# Patient Record
Sex: MALE | Race: WHITE | NOT HISPANIC OR LATINO | URBAN - METROPOLITAN AREA
[De-identification: names, ages, dates, MRNs, and addresses within clinical notes are randomized per-mention and may not be internally consistent; named-entity substitution may affect disease eponyms.]

---

## 2017-01-25 ENCOUNTER — ALLSCRIPTS OFFICE VISIT (OUTPATIENT)
Dept: OTHER | Facility: OTHER | Age: 62
End: 2017-01-25

## 2017-01-25 DIAGNOSIS — M25.522 PAIN IN LEFT ELBOW: ICD-10-CM

## 2017-01-25 DIAGNOSIS — M77.02 MEDIAL EPICONDYLITIS OF LEFT ELBOW: ICD-10-CM

## 2017-07-05 ENCOUNTER — GENERIC CONVERSION - ENCOUNTER (OUTPATIENT)
Dept: OTHER | Facility: OTHER | Age: 62
End: 2017-07-05

## 2017-07-17 ENCOUNTER — ALLSCRIPTS OFFICE VISIT (OUTPATIENT)
Dept: OTHER | Facility: OTHER | Age: 62
End: 2017-07-17

## 2017-07-17 LAB
BILIRUB UR QL STRIP: NORMAL
CLARITY UR: NORMAL
COLOR UR: YELLOW
GLUCOSE (HISTORICAL): NORMAL
HGB UR QL STRIP.AUTO: NORMAL
KETONES UR STRIP-MCNC: NORMAL MG/DL
LEUKOCYTE ESTERASE UR QL STRIP: NORMAL
NITRITE UR QL STRIP: NORMAL
PH UR STRIP.AUTO: 6 [PH]
PROT UR STRIP-MCNC: NORMAL MG/DL
SP GR UR STRIP.AUTO: 1.01
UROBILINOGEN UR QL STRIP.AUTO: NORMAL

## 2018-01-12 VITALS
SYSTOLIC BLOOD PRESSURE: 124 MMHG | BODY MASS INDEX: 24.71 KG/M2 | DIASTOLIC BLOOD PRESSURE: 68 MMHG | WEIGHT: 163 LBS | HEIGHT: 68 IN | HEART RATE: 78 BPM

## 2018-01-13 VITALS
DIASTOLIC BLOOD PRESSURE: 97 MMHG | WEIGHT: 169 LBS | HEART RATE: 76 BPM | SYSTOLIC BLOOD PRESSURE: 137 MMHG | BODY MASS INDEX: 25.61 KG/M2 | HEIGHT: 68 IN

## 2018-05-15 DIAGNOSIS — I48.91 ATRIAL FIBRILLATION, UNSPECIFIED TYPE (HCC): Primary | ICD-10-CM

## 2018-05-15 RX ORDER — APIXABAN 5 MG/1
TABLET, FILM COATED ORAL
Qty: 180 TABLET | Refills: 2 | Status: SHIPPED | OUTPATIENT
Start: 2018-05-15 | End: 2019-06-20 | Stop reason: SDUPTHER

## 2019-06-20 DIAGNOSIS — I48.91 ATRIAL FIBRILLATION, UNSPECIFIED TYPE (HCC): ICD-10-CM

## 2019-06-21 RX ORDER — APIXABAN 5 MG/1
TABLET, FILM COATED ORAL
Qty: 180 TABLET | Refills: 1 | Status: SHIPPED | OUTPATIENT
Start: 2019-06-21 | End: 2020-08-24 | Stop reason: HOSPADM

## 2020-08-19 ENCOUNTER — APPOINTMENT (EMERGENCY)
Dept: RADIOLOGY | Facility: HOSPITAL | Age: 65
DRG: 064 | End: 2020-08-19
Payer: COMMERCIAL

## 2020-08-19 ENCOUNTER — HOSPITAL ENCOUNTER (INPATIENT)
Facility: HOSPITAL | Age: 65
LOS: 5 days | Discharge: HOME | DRG: 064 | End: 2020-08-24
Attending: EMERGENCY MEDICINE | Admitting: EMERGENCY MEDICINE
Payer: COMMERCIAL

## 2020-08-19 DIAGNOSIS — E03.9 HYPOTHYROIDISM, UNSPECIFIED TYPE: ICD-10-CM

## 2020-08-19 DIAGNOSIS — I63.9 CVA (CEREBRAL VASCULAR ACCIDENT) (HCC): Primary | ICD-10-CM

## 2020-08-19 DIAGNOSIS — Z91.14 NONCOMPLIANCE WITH MEDICATION REGIMEN: ICD-10-CM

## 2020-08-19 DIAGNOSIS — I61.4 CEREBELLAR HEMORRHAGE (HCC): ICD-10-CM

## 2020-08-19 DIAGNOSIS — I10 ESSENTIAL HYPERTENSION: ICD-10-CM

## 2020-08-19 DIAGNOSIS — I63.9 CEREBELLAR STROKE (HCC): ICD-10-CM

## 2020-08-19 PROBLEM — R42 DIZZINESS: Status: ACTIVE | Noted: 2020-08-19

## 2020-08-19 LAB
ALBUMIN SERPL BCP-MCNC: 3.8 G/DL (ref 3.5–5)
ALP SERPL-CCNC: 52 U/L (ref 46–116)
ALT SERPL W P-5'-P-CCNC: 46 U/L (ref 12–78)
ANION GAP SERPL CALCULATED.3IONS-SCNC: 6 MMOL/L (ref 4–13)
APTT PPP: 28 SECONDS (ref 23–37)
AST SERPL W P-5'-P-CCNC: 38 U/L (ref 5–45)
ATRIAL RATE: 288 BPM
ATRIAL RATE: 312 BPM
BASOPHILS # BLD AUTO: 0.03 THOUSANDS/ΜL (ref 0–0.1)
BASOPHILS NFR BLD AUTO: 0 % (ref 0–1)
BILIRUB DIRECT SERPL-MCNC: 0.32 MG/DL (ref 0–0.2)
BILIRUB SERPL-MCNC: 1.12 MG/DL (ref 0.2–1)
BUN SERPL-MCNC: 16 MG/DL (ref 5–25)
CALCIUM SERPL-MCNC: 8.9 MG/DL (ref 8.3–10.1)
CHLORIDE SERPL-SCNC: 99 MMOL/L (ref 100–108)
CO2 SERPL-SCNC: 32 MMOL/L (ref 21–32)
CREAT SERPL-MCNC: 1.07 MG/DL (ref 0.6–1.3)
EOSINOPHIL # BLD AUTO: 0.03 THOUSAND/ΜL (ref 0–0.61)
EOSINOPHIL NFR BLD AUTO: 0 % (ref 0–6)
ERYTHROCYTE [DISTWIDTH] IN BLOOD BY AUTOMATED COUNT: 12.9 % (ref 11.6–15.1)
GFR SERPL CREATININE-BSD FRML MDRD: 74 ML/MIN/1.73SQ M
GLUCOSE SERPL-MCNC: 143 MG/DL (ref 65–140)
HCT VFR BLD AUTO: 52 % (ref 36.5–49.3)
HGB BLD-MCNC: 18 G/DL (ref 12–17)
IMM GRANULOCYTES # BLD AUTO: 0.03 THOUSAND/UL (ref 0–0.2)
IMM GRANULOCYTES NFR BLD AUTO: 0 % (ref 0–2)
INR PPP: 1 (ref 0.84–1.19)
LYMPHOCYTES # BLD AUTO: 1.47 THOUSANDS/ΜL (ref 0.6–4.47)
LYMPHOCYTES NFR BLD AUTO: 17 % (ref 14–44)
MCH RBC QN AUTO: 35.4 PG (ref 26.8–34.3)
MCHC RBC AUTO-ENTMCNC: 34.6 G/DL (ref 31.4–37.4)
MCV RBC AUTO: 102 FL (ref 82–98)
MONOCYTES # BLD AUTO: 0.73 THOUSAND/ΜL (ref 0.17–1.22)
MONOCYTES NFR BLD AUTO: 8 % (ref 4–12)
NEUTROPHILS # BLD AUTO: 6.54 THOUSANDS/ΜL (ref 1.85–7.62)
NEUTS SEG NFR BLD AUTO: 75 % (ref 43–75)
NRBC BLD AUTO-RTO: 0 /100 WBCS
PLATELET # BLD AUTO: 162 THOUSANDS/UL (ref 149–390)
PMV BLD AUTO: 10.6 FL (ref 8.9–12.7)
POTASSIUM SERPL-SCNC: 3.8 MMOL/L (ref 3.5–5.3)
PROT SERPL-MCNC: 7.9 G/DL (ref 6.4–8.2)
PROTHROMBIN TIME: 13.2 SECONDS (ref 11.6–14.5)
QRS AXIS: -46 DEGREES
QRS AXIS: -69 DEGREES
QRSD INTERVAL: 84 MS
QRSD INTERVAL: 84 MS
QT INTERVAL: 300 MS
QT INTERVAL: 304 MS
QTC INTERVAL: 391 MS
QTC INTERVAL: 424 MS
RBC # BLD AUTO: 5.08 MILLION/UL (ref 3.88–5.62)
SODIUM SERPL-SCNC: 137 MMOL/L (ref 136–145)
T WAVE AXIS: 70 DEGREES
T WAVE AXIS: 89 DEGREES
T4 FREE SERPL-MCNC: 0.61 NG/DL (ref 0.76–1.46)
TROPONIN I SERPL-MCNC: <0.02 NG/ML
TROPONIN I SERPL-MCNC: <0.02 NG/ML
TSH SERPL DL<=0.05 MIU/L-ACNC: 10.6 UIU/ML (ref 0.36–3.74)
VENTRICULAR RATE: 102 BPM
VENTRICULAR RATE: 117 BPM
WBC # BLD AUTO: 8.83 THOUSAND/UL (ref 4.31–10.16)

## 2020-08-19 PROCEDURE — 84484 ASSAY OF TROPONIN QUANT: CPT | Performed by: EMERGENCY MEDICINE

## 2020-08-19 PROCEDURE — 99223 1ST HOSP IP/OBS HIGH 75: CPT | Performed by: EMERGENCY MEDICINE

## 2020-08-19 PROCEDURE — 96374 THER/PROPH/DIAG INJ IV PUSH: CPT

## 2020-08-19 PROCEDURE — 99223 1ST HOSP IP/OBS HIGH 75: CPT | Performed by: PSYCHIATRY & NEUROLOGY

## 2020-08-19 PROCEDURE — 99285 EMERGENCY DEPT VISIT HI MDM: CPT | Performed by: EMERGENCY MEDICINE

## 2020-08-19 PROCEDURE — 80076 HEPATIC FUNCTION PANEL: CPT | Performed by: EMERGENCY MEDICINE

## 2020-08-19 PROCEDURE — 84439 ASSAY OF FREE THYROXINE: CPT | Performed by: EMERGENCY MEDICINE

## 2020-08-19 PROCEDURE — 99291 CRITICAL CARE FIRST HOUR: CPT

## 2020-08-19 PROCEDURE — 71045 X-RAY EXAM CHEST 1 VIEW: CPT

## 2020-08-19 PROCEDURE — 36415 COLL VENOUS BLD VENIPUNCTURE: CPT | Performed by: EMERGENCY MEDICINE

## 2020-08-19 PROCEDURE — 70496 CT ANGIOGRAPHY HEAD: CPT

## 2020-08-19 PROCEDURE — 93005 ELECTROCARDIOGRAM TRACING: CPT

## 2020-08-19 PROCEDURE — 85730 THROMBOPLASTIN TIME PARTIAL: CPT | Performed by: EMERGENCY MEDICINE

## 2020-08-19 PROCEDURE — 99255 IP/OBS CONSLTJ NEW/EST HI 80: CPT | Performed by: NEUROLOGICAL SURGERY

## 2020-08-19 PROCEDURE — 85025 COMPLETE CBC W/AUTO DIFF WBC: CPT | Performed by: EMERGENCY MEDICINE

## 2020-08-19 PROCEDURE — 80048 BASIC METABOLIC PNL TOTAL CA: CPT | Performed by: EMERGENCY MEDICINE

## 2020-08-19 PROCEDURE — 93010 ELECTROCARDIOGRAM REPORT: CPT | Performed by: INTERNAL MEDICINE

## 2020-08-19 PROCEDURE — 96361 HYDRATE IV INFUSION ADD-ON: CPT

## 2020-08-19 PROCEDURE — G1004 CDSM NDSC: HCPCS

## 2020-08-19 PROCEDURE — 85610 PROTHROMBIN TIME: CPT | Performed by: EMERGENCY MEDICINE

## 2020-08-19 PROCEDURE — 70498 CT ANGIOGRAPHY NECK: CPT

## 2020-08-19 PROCEDURE — 84443 ASSAY THYROID STIM HORMONE: CPT | Performed by: EMERGENCY MEDICINE

## 2020-08-19 RX ORDER — ESMOLOL HYDROCHLORIDE 10 MG/ML
25-200 INJECTION, SOLUTION INTRAVENOUS
Status: DISCONTINUED | OUTPATIENT
Start: 2020-08-19 | End: 2020-08-20

## 2020-08-19 RX ORDER — METOPROLOL TARTRATE 5 MG/5ML
5 INJECTION INTRAVENOUS EVERY 6 HOURS PRN
Status: DISCONTINUED | OUTPATIENT
Start: 2020-08-19 | End: 2020-08-19

## 2020-08-19 RX ORDER — ONDANSETRON 2 MG/ML
4 INJECTION INTRAMUSCULAR; INTRAVENOUS ONCE
Status: COMPLETED | OUTPATIENT
Start: 2020-08-19 | End: 2020-08-19

## 2020-08-19 RX ORDER — CHLORHEXIDINE GLUCONATE 0.12 MG/ML
15 RINSE ORAL EVERY 12 HOURS SCHEDULED
Status: DISCONTINUED | OUTPATIENT
Start: 2020-08-19 | End: 2020-08-19

## 2020-08-19 RX ORDER — HYDRALAZINE HYDROCHLORIDE 20 MG/ML
10 INJECTION INTRAMUSCULAR; INTRAVENOUS EVERY 6 HOURS PRN
Status: DISCONTINUED | OUTPATIENT
Start: 2020-08-19 | End: 2020-08-20

## 2020-08-19 RX ORDER — ONDANSETRON 2 MG/ML
4 INJECTION INTRAMUSCULAR; INTRAVENOUS EVERY 6 HOURS PRN
Status: DISCONTINUED | OUTPATIENT
Start: 2020-08-19 | End: 2020-08-24 | Stop reason: HOSPADM

## 2020-08-19 RX ORDER — LABETALOL 20 MG/4 ML (5 MG/ML) INTRAVENOUS SYRINGE
10 EVERY 4 HOURS PRN
Status: DISCONTINUED | OUTPATIENT
Start: 2020-08-19 | End: 2020-08-19

## 2020-08-19 RX ADMIN — IOHEXOL 85 ML: 350 INJECTION, SOLUTION INTRAVENOUS at 13:56

## 2020-08-19 RX ADMIN — ONDANSETRON 4 MG: 2 INJECTION INTRAMUSCULAR; INTRAVENOUS at 12:04

## 2020-08-19 RX ADMIN — SODIUM CHLORIDE 4 MG/HR: 0.9 INJECTION, SOLUTION INTRAVENOUS at 19:50

## 2020-08-19 RX ADMIN — METOROPROLOL TARTRATE 5 MG: 5 INJECTION, SOLUTION INTRAVENOUS at 16:59

## 2020-08-19 RX ADMIN — SODIUM CHLORIDE 5 MG/HR: 0.9 INJECTION, SOLUTION INTRAVENOUS at 15:17

## 2020-08-19 RX ADMIN — SODIUM CHLORIDE 1000 ML: 0.9 INJECTION, SOLUTION INTRAVENOUS at 11:58

## 2020-08-19 NOTE — ED PROVIDER NOTES
History  Chief Complaint   Patient presents with    High Blood Pressure     pt reports nausea, vomiting, dizziness (lightheaded), could not walk right, "everytime I get up, I was holding onto stuff  I thought I was convulsing earlier  I felt likethis when I was diagnosed with a-fib"     19-year-old male past medical history of AFib, thyroid disease, hypertension who states that he has been noncompliant with his medication for blood pressure and blood thinning for at least 1 year presents to ED stating that 2 nights ago he started to have slurring of his speech as well as difficulty with ambulation  Patient also complaining of nauseousness with few episodes of vomiting  Patient denies fever, sweats, chills, sore throat, cough, chest pain, shortness of breath, abdominal pain, diarrhea constipation dysuria, hematuria  Prior to Admission Medications   Prescriptions Last Dose Informant Patient Reported? Taking? ELIQUIS 5 MG Not Taking at Unknown time  No No   Sig: TAKE 1 TABLET BY MOUTH TWICE A DAY   Patient not taking: Reported on 8/19/2020      Facility-Administered Medications: None       Past Medical History:   Diagnosis Date    A-fib ([facility] [address] )     Disease of thyroid gland     Hypertension        Past Surgical History:   Procedure Laterality Date    ROTATOR CUFF REPAIR         History reviewed  No pertinent family history  I have reviewed and agree with the history as documented  E-Cigarette/Vaping    E-Cigarette Use Never User      E-Cigarette/Vaping Substances     Social History     Tobacco Use    Smoking status: Current Every Day Smoker     Packs/day: 1 00     Types: Cigarettes    Smokeless tobacco: Never Used   Substance Use Topics    Alcohol use: Yes     Drinks per session: 3 or 4    Drug use: Not Currently        Review of Systems   Constitutional: Negative for activity change, appetite change, chills, diaphoresis, fatigue and fever     HENT: Negative for congestion, postnasal drip, rhinorrhea, sinus pressure, sinus pain, sneezing and sore throat  Eyes: Negative for redness and visual disturbance  Respiratory: Negative for apnea, cough, chest tightness, shortness of breath, wheezing and stridor  Cardiovascular: Negative for chest pain, palpitations and leg swelling  Gastrointestinal: Positive for nausea and vomiting  Negative for abdominal distention, abdominal pain, constipation and diarrhea  Endocrine: Negative for polydipsia, polyphagia and polyuria  Genitourinary: Negative for difficulty urinating, dysuria, frequency and urgency  Musculoskeletal: Positive for gait problem  Negative for arthralgias, back pain, joint swelling, myalgias, neck pain and neck stiffness  Skin: Negative for color change, pallor, rash and wound  Neurological: Positive for speech difficulty and weakness  Negative for dizziness, facial asymmetry, light-headedness, numbness and headaches  Physical Exam  ED Triage Vitals   Temperature Pulse Respirations Blood Pressure SpO2   08/19/20 1201 08/19/20 1141 08/19/20 1141 08/19/20 1141 08/19/20 1141   98 3 °F (36 8 °C) 105 20 (!) 189/112 99 %      Temp Source Heart Rate Source Patient Position - Orthostatic VS BP Location FiO2 (%)   08/19/20 1201 08/19/20 1141 08/19/20 1345 -- --   Oral Monitor Lying        Pain Score       08/19/20 1245       5             Orthostatic Vital Signs  Vitals:    08/19/20 1430 08/19/20 1445 08/19/20 1500 08/19/20 1545   BP: (!) 198/112 (!) 190/136 (!) 190/136 156/98   Pulse: 90 100 (!) 118 104   Patient Position - Orthostatic VS:  Lying         Physical Exam  Vitals signs and nursing note reviewed  Constitutional:       General: He is not in acute distress  Appearance: He is well-developed  He is not diaphoretic  HENT:      Head: Normocephalic and atraumatic        Right Ear: External ear normal       Left Ear: External ear normal       Nose: Nose normal    Eyes:      General: No visual field deficit or scleral icterus  Right eye: No discharge  Left eye: No discharge  Conjunctiva/sclera: Conjunctivae normal    Neck:      Musculoskeletal: Normal range of motion and neck supple  Cardiovascular:      Rate and Rhythm: Tachycardia present  Rhythm irregular  Heart sounds: Normal heart sounds  No murmur  No friction rub  No gallop  Pulmonary:      Effort: Pulmonary effort is normal  No respiratory distress  Breath sounds: Normal breath sounds  No wheezing or rales  Abdominal:      General: Bowel sounds are normal  There is no distension  Palpations: Abdomen is soft  There is no mass  Tenderness: There is no abdominal tenderness  There is no guarding  Musculoskeletal: Normal range of motion  General: No tenderness or deformity  Skin:     General: Skin is warm and dry  Coloration: Skin is not pale  Findings: No erythema or rash  Neurological:      Mental Status: He is alert and oriented to person, place, and time  GCS: GCS eye subscore is 4  GCS verbal subscore is 5  GCS motor subscore is 6  Cranial Nerves: Dysarthria present  No cranial nerve deficit or facial asymmetry  Sensory: Sensation is intact  No sensory deficit  Motor: No weakness, tremor, atrophy, abnormal muscle tone or pronator drift  Coordination: Coordination abnormal  Finger-Nose-Finger Test abnormal and Heel to [facility] Test abnormal       Comments: 5/5 strength x 4 extremities  Gross sensation intact  CN intact  Dysmetria and abnormal Heel to shin on R   GCS 15  No pronator drift           Psychiatric:         Mood and Affect: Mood normal          Behavior: Behavior normal          Thought Content:  Thought content normal          Judgment: Judgment normal          ED Medications  Medications   niCARdipine (CARDENE) 25 mg (STANDARD CONCENTRATION) in sodium chloride 0 9% 250 mL (5 mg/hr Intravenous New Bag 8/19/20 1517)   chlorhexidine (PERIDEX) 0 12 % oral rinse 15 mL (has no administration in time range)   hydrALAZINE (APRESOLINE) injection 10 mg (has no administration in time range)   ondansetron (ZOFRAN) injection 4 mg (has no administration in time range)   Labetalol HCl (NORMODYNE) injection 10 mg (has no administration in time range)   sodium chloride 0 9 % bolus 1,000 mL (0 mL Intravenous Stopped 8/19/20 1447)   ondansetron (ZOFRAN) injection 4 mg (4 mg Intravenous Given 8/19/20 1204)   iohexol (OMNIPAQUE) 350 MG/ML injection (MULTI-DOSE) 85 mL (85 mL Intravenous Given 8/19/20 1356)       Diagnostic Studies  Results Reviewed     Procedure Component Value Units Date/Time    TSH, 3rd generation with Free T4 reflex [[id number]]  (Abnormal) Collected:  08/19/20 1158    Lab Status:  Final result Specimen:  Blood from Arm, Left Updated:  08/19/20 1535     TSH 3RD GENERATON 10 600 uIU/mL     Narrative:       Patients undergoing fluorescein dye angiography may retain small amounts of fluorescein in the body for 48-72 hours post procedure  Samples containing fluorescein can produce falsely depressed TSH values  If the patient had this procedure,a specimen should be resubmitted post fluorescein clearance  Hepatic function panel [[id number]]  (Abnormal) Collected:  08/19/20 1158    Lab Status:  Final result Specimen:  Blood from Arm, Left Updated:  08/19/20 1535     Total Bilirubin 1 12 mg/dL      Bilirubin, Direct 0 32 mg/dL      Alkaline Phosphatase 52 U/L      AST 38 U/L      ALT 46 U/L      Total Protein 7 9 g/dL      Albumin 3 8 g/dL     T4, free [[id number]] Collected:  08/19/20 1158    Lab Status:   In process Specimen:  Blood from Arm, Left Updated:  08/19/20 1535    Troponin I [[id number]]  (Normal) Collected:  08/19/20 1446    Lab Status:  Final result Specimen:  Blood from Arm, Left Updated:  08/19/20 1527     Troponin I <0 02 ng/mL     Protime-INR [[id number]]  (Normal) Collected:  08/19/20 1203    Lab Status:  Final result Specimen:  Blood from Arm, Left Updated:  08/19/20 1232 Protime 13 2 seconds      INR 1 00    APTT [[id number]]  (Normal) Collected:  08/19/20 1203    Lab Status:  Final result Specimen:  Blood from Arm, Left Updated:  08/19/20 1232     PTT 28 seconds     Troponin I [[id number]]  (Normal) Collected:  08/19/20 1158    Lab Status:  Final result Specimen:  Blood from Arm, Left Updated:  08/19/20 1228     Troponin I <0 02 ng/mL     Basic metabolic panel [[id number]]  (Abnormal) Collected:  08/19/20 1158    Lab Status:  Final result Specimen:  Blood from Arm, Left Updated:  08/19/20 1225     Sodium 137 mmol/L      Potassium 3 8 mmol/L      Chloride 99 mmol/L      CO2 32 mmol/L      ANION GAP 6 mmol/L      BUN 16 mg/dL      Creatinine 1 07 mg/dL      Glucose 143 mg/dL      Calcium 8 9 mg/dL      eGFR 74 ml/min/1 73sq m     Narrative:       [place] guidelines for Chronic Kidney Disease (CKD):     Stage 1 with normal or high GFR (GFR > 90 mL/min/1 73 square meters)    Stage 2 Mild CKD (GFR = 60-89 mL/min/1 73 square meters)    Stage 3A Moderate CKD (GFR = 45-59 mL/min/1 73 square meters)    Stage 3B Moderate CKD (GFR = 30-44 mL/min/1 73 square meters)    Stage 4 Severe CKD (GFR = 15-29 mL/min/1 73 square meters)    Stage 5 End Stage CKD (GFR <15 mL/min/1 73 square meters)  Note: GFR calculation is accurate only with a steady state creatinine    CBC and differential [[id number]]  (Abnormal) Collected:  08/19/20 1158    Lab Status:  Final result Specimen:  Blood from Arm, Left Updated:  08/19/20 1211     WBC 8 83 Thousand/uL      RBC 5 08 Million/uL      Hemoglobin 18 0 g/dL      Hematocrit 52 0 %       fL      MCH 35 4 pg      MCHC 34 6 g/dL      RDW 12 9 %      MPV 10 6 fL      Platelets 041 Thousands/uL      nRBC 0 /100 WBCs      Neutrophils Relative 75 %      Immat GRANS % 0 %      Lymphocytes Relative 17 %      Monocytes Relative 8 %      Eosinophils Relative 0 %      Basophils Relative 0 %      Neutrophils Absolute 6 54 Thousands/µL Immature Grans Absolute 0 03 Thousand/uL      Lymphocytes Absolute 1 47 Thousands/µL      Monocytes Absolute 0 73 Thousand/µL      Eosinophils Absolute 0 03 Thousand/µL      Basophils Absolute 0 03 Thousands/µL                  CTA head and neck with and without contrast   Final Result by Charo Ortiz MD (08/19 6019)      3 1 x 2 0 cm hemorrhage with mild surrounding perifocal edema in the right cerebellum extending to involve the cerebellar vermis may be hypertensive hemorrhage  However consider MRI to exclude any underlying lesion/neoplasm   No large vessel occlusion   Patent vertebrobasilar system    No significant carotid stenosis   No hydrocephalus          I personally discussed this study with SHERLY Oates on 8/19/2020 at 2:48 PM                 Workstation performed: [id number]         XR chest portable   ED Interpretation by Shannon Mohamud MD (08/19 9780)   Right cerebellar abnormality  Final Result by Radha Chu MD (08/19 7035)      No acute cardiopulmonary disease              Workstation performed: [id number]         MRI inpatient order    (Results Pending)         Procedures  ECG 12 Lead Documentation Only    Date/Time: 8/19/2020 12:52 PM  Performed by: Dominic Burton DO  Authorized by: Dominic Burton DO     Indications / Diagnosis:  Stroke workup  ECG reviewed by me, the ED Provider: yes    Patient location:  ED  Previous ECG:     Previous ECG:  Compared to current    Similarity:  No change    Comparison to cardiac monitor: Yes    Interpretation:     Interpretation: abnormal    Rate:     ECG rate:  102    ECG rate assessment: tachycardic    Rhythm:     Rhythm: atrial fibrillation    Ectopy:     Ectopy: none    QRS:     QRS axis:  Left    QRS intervals:  Normal  Conduction:     Conduction: normal    ST segments:     ST segments:  Normal  T waves:     T waves: normal            ED Course  ED Course as of Aug 19 1606   Wed Aug 19, 2020   1506 Consults placed to neurosx and neuro for ct finding  Patient to be admitted to ICU  BP control with IV jerichone      1516 Discussed cerebellar hemorrhage found on [facility] with patient  Stroke Assessment     Row Name 08/19/20 1459             NIH Stroke Scale    Interval  Baseline      Level of Consciousness (1a )  0      LOC Questions (1b )  0      LOC Commands (1c )  0      Best Gaze (2 )  0      Visual (3 )  0      Facial Palsy (4 )  0      Motor Arm, Left (5a )  0      Motor Arm, Right (5b )  0      Motor Leg, Left (6a )  0      Motor Leg, Right (6b )  0      Limb Ataxia (7 )  1      Sensory (8 )  0      Best Language (9 )  0      Dysarthria (10 )  1      Extinction and Inattention (11 ) (Formerly Neglect)  0      Total  2          First Filed Value   TPA Decision  Patient not a TPA candidate  Patient is not a candidate options  -- [hemorrhage]                                MDM  Number of Diagnoses or Management Options  Cerebellar hemorrhage [facility]):   CVA (cerebral vascular accident) [facility]):   Noncompliance with medication regimen:   Diagnosis management comments: Patient physical exam with concern for likely cerebellar infarct  Symptoms started approximately 3648 hours prior to arrival   Will get blood work, EKG, chest x-ray, CTA head and neck  Discussed CT findings with Radiology with concern for right-sided cerebellar hemorrhage  Will lower the patient's blood pressure with Cardene  Goal systolic blood pressure of 150  Admit to ICU  Consult placed to both Neurology and Neurosurgery  Discussed findings with patient at bedside          Disposition  Final diagnoses:   CVA (cerebral vascular accident) ([facility] [address] )   Noncompliance with medication regimen   Cerebellar hemorrhage ([facility] [address] )     Time reflects when diagnosis was documented in both MDM as applicable and the Disposition within this note     Time User Action Codes Description Comment    8/19/2020 11:58 AM Tatyana Pinhook Add [I63 9] CVA (cerebral vascular accident) ([facility] [address] )     8/19/2020 11:59 AM Jun Shahid Add [Z91 14] Noncompliance with medication regimen     8/19/2020  3:15 PM Jimmy Reyes Add [I61 4] Cerebellar hemorrhage [facility])       ED Disposition     ED Disposition Condition Date/Time Comment    Admit Stable Wed Aug 19, 2020  3:15 PM Case was discussed with ICU and the patient's admission status was agreed to be Admission Status: inpatient status to the service of Dr Maritza Mahan   Follow-up Information    None         Patient's Medications   Discharge Prescriptions    No medications on file     No discharge procedures on file  PDMP Review     None           ED Provider  Attending physically available and evaluated Vega Yañez    NABIL managed the patient along with the ED Attending      Electronically Signed by         Yasmeen Vega DO  08/19/20 9466

## 2020-08-19 NOTE — PLAN OF CARE
Problem: Nutrition/Hydration-ADULT  Goal: Nutrient/Hydration intake appropriate for improving, restoring or maintaining nutritional needs  Description: Monitor and assess patient's nutrition/hydration status for malnutrition  Collaborate with interdisciplinary team and initiate plan and interventions as ordered  Monitor patient's weight and dietary intake as ordered or per policy  Utilize nutrition screening tool and intervene as necessary  Determine patient's food preferences and provide high-protein, high-caloric foods as appropriate       INTERVENTIONS:  - Monitor oral intake, urinary output, labs, and treatment plans  - Assess nutrition and hydration status and recommend course of action  - Evaluate amount of meals eaten  - Assist patient with eating if necessary   - Allow adequate time for meals  - Recommend/ encourage appropriate diets, oral nutritional supplements, and vitamin/mineral supplements  - Order, calculate, and assess calorie counts as needed  - Recommend, monitor, and adjust tube feedings and TPN/PPN based on assessed needs  - Assess need for intravenous fluids  - Provide specific nutrition/hydration education as appropriate  - Include patient/family/caregiver in decisions related to nutrition  Outcome: Progressing     Problem: NEUROSENSORY - ADULT  Goal: Achieves stable or improved neurological status  Description: INTERVENTIONS  - Monitor and report changes in neurological status  - Monitor vital signs such as temperature, blood pressure, glucose, and any other labs ordered   - Initiate measures to prevent increased intracranial pressure  - Monitor for seizure activity and implement precautions if appropriate      Outcome: Progressing  Goal: Remains free of injury related to seizures activity  Description: INTERVENTIONS  - Maintain airway, patient safety  and administer oxygen as ordered  - Monitor patient for seizure activity, document and report duration and description of seizure to physician/advanced practitioner  - If seizure occurs,  ensure patient safety during seizure  - Reorient patient post seizure  - Seizure pads on all 4 side rails  - Instruct patient/family to notify RN of any seizure activity including if an aura is experienced  - Instruct patient/family to call for assistance with activity based on nursing assessment  - Administer anti-seizure medications if ordered    Outcome: Progressing  Goal: Achieves maximal functionality and self care  Description: INTERVENTIONS  - Monitor swallowing and airway patency with patient fatigue and changes in neurological status  - Encourage and assist patient to increase activity and self care     - Encourage visually impaired, hearing impaired and aphasic patients to use assistive/communication devices  Outcome: Progressing     Problem: CARDIOVASCULAR - ADULT  Goal: Maintains optimal cardiac output and hemodynamic stability  Description: INTERVENTIONS:  - Monitor I/O, vital signs and rhythm  - Monitor for S/S and trends of decreased cardiac output  - Administer and titrate ordered vasoactive medications to optimize hemodynamic stability  - Assess quality of pulses, skin color and temperature  - Assess for signs of decreased coronary artery perfusion  - Instruct patient to report change in severity of symptoms  Outcome: Progressing  Goal: Absence of cardiac dysrhythmias or at baseline rhythm  Description: INTERVENTIONS:  - Continuous cardiac monitoring, vital signs, obtain 12 lead EKG if ordered  - Administer antiarrhythmic and heart rate control medications as ordered  - Monitor electrolytes and administer replacement therapy as ordered  Outcome: Progressing     Problem: GASTROINTESTINAL - ADULT  Goal: Minimal or absence of nausea and/or vomiting  Description: INTERVENTIONS:  - Administer IV fluids if ordered to ensure adequate hydration  - Maintain NPO status until nausea and vomiting are resolved  - Nasogastric tube if ordered  - Administer ordered antiemetic medications as needed  - Provide nonpharmacologic comfort measures as appropriate  - Advance diet as tolerated, if ordered  - Consider nutrition services referral to assist patient with adequate nutrition and appropriate food choices  Outcome: Progressing     Problem: GENITOURINARY - ADULT  Goal: Maintains or returns to baseline urinary function  Description: INTERVENTIONS:  - Assess urinary function  - Encourage oral fluids to ensure adequate hydration if ordered  - Administer IV fluids as ordered to ensure adequate hydration  - Administer ordered medications as needed  - Offer frequent toileting  - Follow urinary retention protocol if ordered  Outcome: Progressing  Goal: Absence of urinary retention  Description: INTERVENTIONS:  - Assess patients ability to void and empty bladder  - Monitor I/O  - Bladder scan as needed  - Discuss with physician/AP medications to alleviate retention as needed  - Discuss catheterization for long term situations as appropriate  Outcome: Progressing     Problem: METABOLIC, FLUID AND ELECTROLYTES - ADULT  Goal: Electrolytes maintained within normal limits  Description: INTERVENTIONS:  - Monitor labs and assess patient for signs and symptoms of electrolyte imbalances  - Administer electrolyte replacement as ordered  - Monitor response to electrolyte replacements, including repeat lab results as appropriate  - Instruct patient on fluid and nutrition as appropriate  Outcome: Progressing  Goal: Fluid balance maintained  Description: INTERVENTIONS:  - Monitor labs   - Monitor I/O and WT  - Instruct patient on fluid and nutrition as appropriate  - Assess for signs & symptoms of volume excess or deficit  Outcome: Progressing  Goal: Glucose maintained within target range  Description: INTERVENTIONS:  - Monitor Blood Glucose as ordered  - Assess for signs and symptoms of hyperglycemia and hypoglycemia  - Administer ordered medications to maintain glucose within target range  - Assess nutritional intake and initiate nutrition service referral as needed  Outcome: Progressing     Problem: SKIN/TISSUE INTEGRITY - ADULT  Goal: Skin integrity remains intact  Description: INTERVENTIONS  - Identify patients at risk for skin breakdown  - Assess and monitor skin integrity  - Assess and monitor nutrition and hydration status  - Monitor labs (i e  albumin)  - Assess for incontinence   - Turn and reposition patient  - Assist with mobility/ambulation  - Relieve pressure over bony prominences  - Avoid friction and shearing  - Provide appropriate hygiene as needed including keeping skin clean and dry  - Evaluate need for skin moisturizer/barrier cream  - Collaborate with interdisciplinary team (i e  Nutrition, Rehabilitation, etc )   - Patient/family teaching  Outcome: Progressing  Goal: Incision(s), wounds(s) or drain site(s) healing without S/S of infection  Description: INTERVENTIONS  - Assess and document risk factors for skin impairment   - Assess and document dressing, incision, wound bed, drain sites and surrounding tissue  - Consider nutrition services referral as needed  - Oral mucous membranes remain intact  - Provide patient/ family education  Outcome: Progressing  Goal: Oral mucous membranes remain intact  Description: INTERVENTIONS  - Assess oral mucosa and hygiene practices  - Implement preventative oral hygiene regimen  - Implement oral medicated treatments as ordered  - Initiate Nutrition services referral as needed  Outcome: Progressing     Problem: MUSCULOSKELETAL - ADULT  Goal: Maintain or return mobility to safest level of function  Description: INTERVENTIONS:  - Assess patient's ability to carry out ADLs; assess patient's baseline for ADL function and identify physical deficits which impact ability to perform ADLs (bathing, care of mouth/teeth, toileting, grooming, dressing, etc )  - Assess/evaluate cause of self-care deficits   - Assess range of motion  - Assess patient's mobility  - Assess patient's need for assistive devices and provide as appropriate  - Encourage maximum independence but intervene and supervise when necessary  - Involve family in performance of ADLs  - Assess for home care needs following discharge   - Consider OT consult to assist with ADL evaluation and planning for discharge  - Provide patient education as appropriate  Outcome: Progressing  Goal: Maintain proper alignment of affected body part  Description: INTERVENTIONS:  - Support, maintain and protect limb and body alignment  - Provide patient/ family with appropriate education  Outcome: Progressing     Problem: PAIN - ADULT  Goal: Verbalizes/displays adequate comfort level or baseline comfort level  Description: Interventions:  - Encourage patient to monitor pain and request assistance  - Assess pain using appropriate pain scale  - Administer analgesics based on type and severity of pain and evaluate response  - Implement non-pharmacological measures as appropriate and evaluate response  - Consider cultural and social influences on pain and pain management  - Notify physician/advanced practitioner if interventions unsuccessful or patient reports new pain  Outcome: Progressing     Problem: INFECTION - ADULT  Goal: Absence or prevention of progression during hospitalization  Description: INTERVENTIONS:  - Assess and monitor for signs and symptoms of infection  - Monitor lab/diagnostic results  - Monitor all insertion sites, i e  indwelling lines, tubes, and drains  - Monitor endotracheal if appropriate and nasal secretions for changes in amount and color  - Roann appropriate cooling/warming therapies per order  - Administer medications as ordered  - Instruct and encourage patient and family to use good hand hygiene technique  - Identify and instruct in appropriate isolation precautions for identified infection/condition  Outcome: Progressing     Problem: SAFETY ADULT  Goal: Patient will remain free of falls  Description: INTERVENTIONS:  - Assess patient frequently for physical needs  -  Identify cognitive and physical deficits and behaviors that affect risk of falls    -  [place] fall precautions as indicated by assessment   - Educate patient/family on patient safety including physical limitations  - Instruct patient to call for assistance with activity based on assessment  - Modify environment to reduce risk of injury  - Consider OT/PT consult to assist with strengthening/mobility  Outcome: Progressing  Goal: Maintain or return to baseline ADL function  Description: INTERVENTIONS:  -  Assess patient's ability to carry out ADLs; assess patient's baseline for ADL function and identify physical deficits which impact ability to perform ADLs (bathing, care of mouth/teeth, toileting, grooming, dressing, etc )  - Assess/evaluate cause of self-care deficits   - Assess range of motion  - Assess patient's mobility; develop plan if impaired  - Assess patient's need for assistive devices and provide as appropriate  - Encourage maximum independence but intervene and supervise when necessary  - Involve family in performance of ADLs  - Assess for home care needs following discharge   - Consider OT consult to assist with ADL evaluation and planning for discharge  - Provide patient education as appropriate  Outcome: Progressing  Goal: Maintain or return mobility status to optimal level  Description: INTERVENTIONS:  - Assess patient's baseline mobility status (ambulation, transfers, stairs, etc )    - Identify cognitive and physical deficits and behaviors that affect mobility  - Identify mobility aids required to assist with transfers and/or ambulation (gait belt, sit-to-stand, lift, walker, cane, etc )  - [place] fall precautions as indicated by assessment  - Record patient progress and toleration of activity level on Mobility SBAR; progress patient to next Phase/Stage  - Instruct patient to call for assistance with activity based on assessment  - Consider rehabilitation consult to assist with strengthening/weightbearing, etc   Outcome: Progressing     Problem: DISCHARGE PLANNING  Goal: Discharge to home or other facility with appropriate resources  Description: INTERVENTIONS:  - Identify barriers to discharge w/patient and caregiver  - Arrange for needed discharge resources and transportation as appropriate  - Identify discharge learning needs (meds, wound care, etc )  - Arrange for interpretive services to assist at discharge as needed  - Refer to Case Management Department for coordinating discharge planning if the patient needs post-hospital services based on physician/advanced practitioner order or complex needs related to functional status, cognitive ability, or social support system  Outcome: Progressing     Problem: Knowledge Deficit  Goal: Patient/family/caregiver demonstrates understanding of disease process, treatment plan, medications, and discharge instructions  Description: Complete learning assessment and assess knowledge base  Interventions:  - Provide teaching at level of understanding  - Provide teaching via preferred learning methods  Outcome: Progressing     Problem: Neurological Deficit  Goal: Neurological status is stable or improving  Description: Interventions:  - Monitor and assess patient's level of consciousness, motor function, sensory function, and level of assistance needed for ADLs  - Monitor and report changes from baseline  Collaborate with interdisciplinary team to initiate plan and implement interventions as ordered  - Provide and maintain a safe environment  - Consider seizure precautions  - Consider fall precautions  - Consider aspiration precautions  - Consider bleeding precautions  Outcome: Progressing     Problem:  Activity Intolerance/Impaired Mobility  Goal: Mobility/activity is maintained at optimum level for patient  Description: Interventions:  - Assess and monitor patient  barriers to mobility and need for assistive/adaptive devices  - Assess patient's emotional response to limitations  - Collaborate with interdisciplinary team and initiate plans and interventions as ordered  - Encourage independent activity per ability   - Maintain proper body alignment  - Perform active/passive rom as tolerated/ordered  - Plan activities to conserve energy   - Turn patient as appropriate  Outcome: Progressing     Problem: Communication Impairment  Goal: Ability to express needs and understand communication  Description: Assess patient's communication skills and ability to understand information  Patient will demonstrate use of effective communication techniques, alternative methods of communication and understanding even if not able to speak  - Encourage communication and provide alternate methods of communication as needed  - Collaborate with case management/ for discharge needs  - Include patient/family/caregiver in decisions related to communication  Outcome: Progressing     Problem: Potential for Aspiration  Goal: Non-ventilated patient's risk of aspiration is minimized  Description: Assess and monitor vital signs, respiratory status, and labs (WBC)  Monitor for signs of aspiration (tachypnea, cough, rales, wheezing, cyanosis, fever)  - Assess and monitor patient's ability to swallow  - Place patient up in chair to eat if possible  - HOB up at 90 degrees to eat if unable to get patient up into chair   - Supervise patient during oral intake  - Instruct patient/ family to take small bites  - Instruct patient/ family to take small single sips when taking liquids    - Follow patient-specific strategies generated by speech pathologist   Outcome: Progressing     Problem: Nutrition  Goal: Nutrition/Hydration status is improving  Description: Monitor and assess patient's nutrition/hydration status for malnutrition (ex- brittle hair, bruises, dry skin, pale skin and conjunctiva, muscle wasting, smooth red tongue, and disorientation)  Collaborate with interdisciplinary team and initiate plan and interventions as ordered  Monitor patient's weight and dietary intake as ordered or per policy  Utilize nutrition screening tool and intervene per policy  Determine patient's food preferences and provide high-protein, high-caloric foods as appropriate  - Assist patient with eating   - Allow adequate time for meals   - Encourage patient to take dietary supplement as ordered  - Collaborate with clinical nutritionist   - Include patient/family/caregiver in decisions related to nutrition    Outcome: Progressing

## 2020-08-19 NOTE — ED NOTES
[facility] transported to [facility], RN  08/19/20 [id number]

## 2020-08-19 NOTE — ED ATTENDING ATTESTATION
Final Diagnosis:  1  CVA (cerebral vascular accident) ([facility] [address] )    2  Noncompliance with medication regimen    3  Cerebellar hemorrhage [facility])      ED Course as of Aug 23 1332   Wed Aug 19, 2020   1222 WBC: 8 83   1222 Hemoglobin(!): 18 0   1222 Platelet Count: 778   1222 Higher risk of rouleaux --> stroke  Hemoglobin(!): 18 0       IVinita MD, saw and evaluated the patient  All available labs and X-rays were ordered by me or the resident and have been reviewed by myself  I discussed the patient with the resident / non-physician and agree with the resident's / non-physician practitioner's findings and plan as documented in the resident's / non-physician practicitioner's note, except where noted  At this point, I agree with the current assessment done in the ED  I was present during key portions of all procedures performed unless otherwise stated  Chief Complaint   Patient presents with    High Blood Pressure     pt reports nausea, vomiting, dizziness (lightheaded), could not walk right, "everytime I get up, I was holding onto stuff  I thought I was convulsing earlier  I felt likethis when I was diagnosed with a-fib"     This is a 60 y o  male presenting for evaluation of likely stroke  The patient is noncompliant with his medications specifically his Eliquis and beta-blocker for atrial fibrillation for greater than 1 year  He was doing okay until maybe 2 days ago when he started knows that he was having nausea, several episodes of vomiting feeling lightheaded like he would fall over, feeling his ambulation is different and difficult  His speech is also dysarthric since Monday  No fevers chills no chest pain shortness of breath  No palpitations  No for numbness or tingling  PMH:   has a past medical history of A-fib ([facility] [address] ), Disease of thyroid gland, and Hypertension  PSH:   has a past surgical history that includes Rotator cuff repair      Social:  Social History     Substance and Sexual Activity   Alcohol Use Yes    Drinks per session: 3 or 4     Social History     Tobacco Use   Smoking Status Current Every Day Smoker    Packs/day: 0 50    Types: Cigarettes   Smokeless Tobacco Never Used     Social History     Substance and Sexual Activity   Drug Use Not Currently     PE:  Vitals:    08/22/20 2206 08/22/20 2206 08/23/20 0600 08/23/20 0723   BP: 147/81   149/95   BP Location:       Pulse:  60  88   Resp: 18   17   Temp:  98 4 °F (36 9 °C)  99 2 °F (37 3 °C)   TempSrc:       SpO2:  100%  97%   Weight:   69 kg (152 lb 1 9 oz)    Height:       General: VSS, NAD, awake, alert  Well-nourished, well-developed  Appears stated age  Head: Normocephalic, atraumatic, nontender  Eyes: PERRL, EOM-I  No diplopia  No hyphema  No subconjunctival hemorrhages  Symmetrical lids  ENTAtraumatic external nose and ears  MMM  No stridor  Normal phonation  No drooling  Base of mouth is soft  No mastoid tenderness  Neck: Symmetric, trachea midline  No JVD  CV: Peripheral pulses +2 throughout  No chest wall tenderness  Lungs:   Unlabored   No retractions  No crepitus  No tachypnea  No paradoxical motion  Abd: +BS, soft, NT/ND    MSK:   FROM   No lower extremity edema  Back:   No C/T/L-spine tenderness  No CVAT  Skin: Dry, intact  Neuro: AAOx3, GCS 15, CN II-XII grossly intact  Motor grossly intact  Sensory grossly intact  EHL/FHL/PF/DF/KF/KE/HF/HE 5/5  No saddle anesthesia  M/U/R/A nerve sensation bilateral intact and equal    strength 5/5  Good capillary refill  2+ radial pulses, bilaterally equal    BICEPS/TRICEPS 5/5  Shoulder abduction/adduction 5/5  No pronator drift  No aphasia  +dysarthria  AbNormal finger to nose (dysmetria); abnormal heel/shin on RIGHT  Normal rapid alternating movements of hands  No nystagmus  No facial droop     Psychiatric/Behavioral: Appropriate mood and affect   Exam: deferred  A:  - Stroke  P:  - patient likely had a thromboembolic stroke secondary to atrial fibrillation noncompliance with stroke regimen  Permissive hypertension at this time, likely aspirin if CT negative  Will do CTA as well as on concern for posterior cerebral stroke  Will need MRI  Will need optimization of medical care  Will admit to Medicine, consult Neurology  - 13 point ROS was performed and all are normal unless stated in the history above  - Nursing note reviewed  Vitals reviewed  - Orders placed by myself and/or advanced practitioner / resident     - Previous chart was reviewed  - No language barrier    - History obtained from wife patient  - There are no limitations to the history obtained  - Critical care time: Not applicable for this patient  - Patient does not need initiation of IV thrombolytics: Last Known well was 48 hours ago       Code Status: Level 1 - Full Code  Advance Directive and Living Will:      Power of :    POLST:      Medications   ondansetron (ZOFRAN) injection 4 mg ( Intravenous MAR Unhold 8/20/20 1758)   acetaminophen (TYLENOL) tablet 650 mg ( Oral MAR Unhold 8/20/20 1758)   Labetalol HCl (NORMODYNE) injection 10 mg ( Intravenous MAR Unhold 8/20/20 1758)   lisinopril (ZESTRIL) tablet 10 mg (10 mg Oral Given 8/23/20 0836)   metoprolol tartrate (LOPRESSOR) tablet 25 mg (25 mg Oral Given 8/23/20 0836)   levothyroxine tablet 75 mcg (75 mcg Oral Given 8/23/20 0516)   sodium chloride 0 9 % bolus 1,000 mL (0 mL Intravenous Stopped 8/19/20 1447)   ondansetron (ZOFRAN) injection 4 mg (4 mg Intravenous Given 8/19/20 1204)   iohexol (OMNIPAQUE) 350 MG/ML injection (MULTI-DOSE) 85 mL (85 mL Intravenous Given 8/19/20 1356)   gadobutrol injection (MULTI-DOSE) SOLN 6 mL (6 mL Intravenous Given 8/20/20 0130)   potassium chloride (K-DUR,KLOR-CON) CR tablet 40 mEq (40 mEq Oral Given 8/20/20 0947)   potassium chloride (K-DUR,KLOR-CON) CR tablet 40 mEq (40 mEq Oral Given 8/20/20 1212)   iohexol (OMNIPAQUE) 350 MG/ML injection (MULTI-DOSE) 85 mL (85 mL Intravenous Given 8/22/20 1231)     CTA head w wo contrast   Final Result      Minor interval increase in the hemorrhage related to right superior cerebellar arterial infarct  Stable Deformity of the 4th ventricle no indication of hydrocephalus  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  Neurovascular consultation suggested  The study was marked in [facility] for immediate notification  Workstation performed: [id number]         CT head wo contrast   Final Result      Grossly stable anterosuperior right cerebellum parenchymal hematoma and surrounding vasogenic edema  Unchanged regional mass effect and partial effacement of superior 4th ventricle with stable size of ventricular system  No hydrocephalus  Workstation performed: [id number]         MRI brain w wo contrast   Final Result      Right superior cerebellar artery hemorrhagic infarct  Local mass effect without hydrocephalus  Sluggish flow within the right vertebral is likely  The study was marked in [facility] for immediate notification  Workstation performed: [id number]         CTA head and neck with and without contrast   Final Result      3 1 x 2 0 cm hemorrhage with mild surrounding perifocal edema in the right cerebellum extending to involve the cerebellar vermis may be hypertensive hemorrhage  However consider MRI to exclude any underlying lesion/neoplasm   No large vessel occlusion   Patent vertebrobasilar system    No significant carotid stenosis   No hydrocephalus          I personally discussed this study with SHERLY Oates on 8/19/2020 at 2:48 PM                 Workstation performed: [id number]         XR chest portable   ED Interpretation   Right cerebellar abnormality  Final Result      No acute cardiopulmonary disease              Workstation performed: [id number]           Orders Placed This Encounter   Procedures    ED ECG Documentation Only    CTA head and neck with and without contrast    XR chest portable    MRI brain w wo contrast    CT head wo contrast    CTA head w wo contrast    CBC and differential    Basic metabolic panel    Troponin I    TSH, 3rd generation with Free T4 reflex    Hepatic function panel    Protime-INR    APTT    T4, free    CBC and differential    Basic metabolic panel    Magnesium    CBC and differential    Basic metabolic panel    Magnesium    Hemoglobin A1C    Lipid Panel with Direct LDL reflex    Diet Regular; Regular House; Cardiac    Nursing communication Continue IV as ordered      Notify admitting physician    Notify admitting physician on arrival    Vital Signs    Daily weights    I/O    Nursing dysphagia assessment    Turn patient    Up with assistance    Insert peripheral IV    Maintain IV access    Ambulate patient    Nursing Communication SBP goal < 160 mmHg    Continuous Pulse Oximetry    Reassess Vital Signs x 1 in 4 Hours    Reassess CIWA-Ar x 1 in 4 hours    Neuro checks    Level 1-Full Code: all life saving measures are indicated    Inpatient consult to Neurosurgery    Consult to neurology    Consult to Case Management    Occupational Therapy Evaluation and Treatment    Physical Therapy  Evaluation and Treatment    Speech Language Evaluation and Treatment    EKG RESULTS    ECG 12 lead    ECG 12 lead    ECG 12 lead    Echo complete with contrast if indicated    Inpatient Admission    Transfer patient    Fall precautions    Update level of care     Labs Reviewed   CBC AND DIFFERENTIAL - Abnormal       Result Value Ref Range Status    WBC 8 83  4 31 - 10 16 Thousand/uL Final    RBC 5 08  3 88 - 5 62 Million/uL Final    Hemoglobin 18 0 (*) 12 0 - 17 0 g/dL Final    Hematocrit 52 0 (*) 36 5 - 49 3 % Final     (*) 82 - 98 fL Final    MCH 35 4 (*) 26 8 - 34 3 pg Final    MCHC 34 6  31 4 - 37 4 g/dL Final    RDW 12 9  11 6 - 15 1 % Final    MPV 10 6  8 9 - 12 7 fL Final Platelets 740  666 - 390 Thousands/uL Final    nRBC 0  /100 WBCs Final    Neutrophils Relative 75  43 - 75 % Final    Immat GRANS % 0  0 - 2 % Final    Lymphocytes Relative 17  14 - 44 % Final    Monocytes Relative 8  4 - 12 % Final    Eosinophils Relative 0  0 - 6 % Final    Basophils Relative 0  0 - 1 % Final    Neutrophils Absolute 6 54  1 85 - 7 62 Thousands/µL Final    Immature Grans Absolute 0 03  0 00 - 0 20 Thousand/uL Final    Lymphocytes Absolute 1 47  0 60 - 4 47 Thousands/µL Final    Monocytes Absolute 0 73  0 17 - 1 22 Thousand/µL Final    Eosinophils Absolute 0 03  0 00 - 0 61 Thousand/µL Final    Basophils Absolute 0 03  0 00 - 0 10 Thousands/µL Final   BASIC METABOLIC PANEL - Abnormal    Sodium 137  136 - 145 mmol/L Final    Potassium 3 8  3 5 - 5 3 mmol/L Final    Chloride 99 (*) 100 - 108 mmol/L Final    CO2 32  21 - 32 mmol/L Final    ANION GAP 6  4 - 13 mmol/L Final    BUN 16  5 - 25 mg/dL Final    Creatinine 1 07  0 60 - 1 30 mg/dL Final    Comment: Standardized to IDMS reference method    Glucose 143 (*) 65 - 140 mg/dL Final    Comment: If the patient is fasting, the ADA then defines impaired fasting glucose as > 100 mg/dL and diabetes as > or equal to 123 mg/dL  Specimen collection should occur prior to Sulfasalazine administration due to the potential for falsely depressed results  Specimen collection should occur prior to Sulfapyridine administration due to the potential for falsely elevated results      Calcium 8 9  8 3 - 10 1 mg/dL Final    eGFR 74  ml/min/1 73sq m Final    Narrative:     [place] guidelines for Chronic Kidney Disease (CKD):     Stage 1 with normal or high GFR (GFR > 90 mL/min/1 73 square meters)    Stage 2 Mild CKD (GFR = 60-89 mL/min/1 73 square meters)    Stage 3A Moderate CKD (GFR = 45-59 mL/min/1 73 square meters)    Stage 3B Moderate CKD (GFR = 30-44 mL/min/1 73 square meters)    Stage 4 Severe CKD (GFR = 15-29 mL/min/1 73 square meters)    Stage 5 End Stage CKD (GFR <15 mL/min/1 73 square meters)  Note: GFR calculation is accurate only with a steady state creatinine   TSH, 3RD GENERATION WITH FREE T4 REFLEX - Abnormal    TSH 3RD GENERATON 10 600 (*) 0 358 - 3 740 uIU/mL Final    Comment: Using supplements with high doses of biotin 20 to more than 300 times greater than the adequate daily intake for adults of 30 mcg/day as established by the [place] [employer], can cause falsely depress results  Narrative:     Patients undergoing fluorescein dye angiography may retain small amounts of fluorescein in the body for 48-72 hours post procedure  Samples containing fluorescein can produce falsely depressed TSH values  If the patient had this procedure,a specimen should be resubmitted post fluorescein clearance  HEPATIC FUNCTION PANEL - Abnormal    Total Bilirubin 1 12 (*) 0 20 - 1 00 mg/dL Final    Comment: Use of this assay is not recommended for patients undergoing treatment with eltrombopag due to the potential for falsely elevated results  Bilirubin, Direct 0 32 (*) 0 00 - 0 20 mg/dL Final    Alkaline Phosphatase 52  46 - 116 U/L Final    AST 38  5 - 45 U/L Final    Comment: Specimen collection should occur prior to Sulfasalazine and/or Sulfapyridine administration due to the potential for falsely depressed results  ALT 46  12 - 78 U/L Final    Comment: Specimen collection should occur prior to Sulfasalazine and/or Sulfapyridine administration due to the potential for falsely depressed results       Total Protein 7 9  6 4 - 8 2 g/dL Final    Albumin 3 8  3 5 - 5 0 g/dL Final   TROPONIN I - Normal    Troponin I <0 02  <=0 04 ng/mL Final    Comment: Siemens Chemistry analyzer 99% cutoff is > 0 04 ng/mL in network labs     o cTnI 99% cutoff is useful only when applied to patients in the clinical setting of myocardial ischemia   o cTnI 99% cutoff should be interpreted in the context of clinical history, ECG findings and possibly cardiac imaging to establish correct diagnosis  o cTnI 99% cutoff may be suggestive but clearly not indicative of a coronary event without the clinical setting of myocardial ischemia  PROTIME-INR - Normal    Protime 13 2  11 6 - 14 5 seconds Final    INR 1 00  0 84 - 1 19 Final   APTT - Normal    PTT 28  23 - 37 seconds Final    Comment: Therapeutic Heparin Range =  60-90 seconds   TROPONIN I - Normal    Troponin I <0 02  <=0 04 ng/mL Final    Comment: Siemens Chemistry analyzer 99% cutoff is > 0 04 ng/mL in network labs     o cTnI 99% cutoff is useful only when applied to patients in the clinical setting of myocardial ischemia   o cTnI 99% cutoff should be interpreted in the context of clinical history, ECG findings and possibly cardiac imaging to establish correct diagnosis  o cTnI 99% cutoff may be suggestive but clearly not indicative of a coronary event without the clinical setting of myocardial ischemia  Time reflects when diagnosis was documented in both MDM as applicable and the Disposition within this note     Time User Action Codes Description Comment    8/19/2020 11:58 AM Starr Mejias Add [I63 9] CVA (cerebral vascular accident) ([facility] [address] )     8/19/2020 11:59 AM Starr Basil Add [Z91 14] Noncompliance with medication regimen     8/19/2020  3:15 PM Renita Screws Add [I61 4] Cerebellar hemorrhage [facility])       ED Disposition     ED Disposition Condition Date/Time Comment    Admit Stable Wed Aug 19, 2020  3:15 PM Case was discussed with ICU and the patient's admission status was agreed to be Admission Status: inpatient status to the service of Dr Foster Campa           Follow-up Information     Follow up With Specialties Details Why [address] [facility] Neurosurgery Follow up today follow up with Dr Aureliano Smith in 2-4 weeks [address] [facility] [place] [address] [phone]  [facility], [address] [facility], [place], [place], [address]    Niesha Woodard MD  Follow up please call to schedule follow up appointment [id number] [address] [facility] Neurology [facility] Neurology Follow up An appointment was requested for you with the Stroke Clinic within 4 weeks  If you do not hear from the  within 1 week from discharge please call the number above to schedule [facility] [place] [phone]  [address] [facility] Neurology [facility], [address] [facility], [facility], [place], [address]        Current Discharge Medication List      CONTINUE these medications which have NOT CHANGED    Details   ELIQUIS 5 MG TAKE 1 TABLET BY MOUTH TWICE A DAY  Qty: 180 tablet, Refills: 1    Associated Diagnoses: Atrial fibrillation, unspecified type ([facility] [address] )           No discharge procedures on file  Prior to Admission Medications   Prescriptions Last Dose Informant Patient Reported? Taking? ELIQUIS 5 MG Not Taking at Unknown time  No No   Sig: TAKE 1 TABLET BY MOUTH TWICE A DAY   Patient not taking: Reported on 8/19/2020      Facility-Administered Medications: None       Portions of the record may have been created with voice recognition software  Occasional wrong word or "sound a like" substitutions may have occurred due to the inherent limitations of voice recognition software  Read the chart carefully and recognize, using context, where substitutions have occurred      Electronically signed by:  Jerrell Butts

## 2020-08-19 NOTE — H&P
H&P Exam - Critical Care   Jasmyne Salgado  60 y o  male MRN: [id number]  Unit/Bed#: ED 08 Encounter: [id number]      -------------------------------------------------------------------------------------------------------------  Chief Complaint: 2 days of nausea/vomiting, lightheadedness, gait difficulty, dysarthria    History of Present Illness     Jasmyne Salgado  is a 60 y o  male PMHx Afib (non-compliant w/ Eliquis), HTN, hypothyroidism, who presents after 2 days of n/v, lightheadedness, gait difficulties, and dysarthria, found to have R cerebellar hemorrhage w/ surrounding edema w/ some mass effect on the 4th ventricle on CTA   Admitted for ICU care  2 days ago had nausea with non-bloody, non-bilious emesis lasting 1 day  At this time, also noticed lightheadedness with difficulty maintaining balance and difficulty walking  Also noticed his speech changed, noticed more slurring of words and stuttering  In the ED, pt AAOx4, states his walking was a little better this morning than the previous day  Denies any headache, changes in vision, n/v/d, cough, chest pain/SOB     -------------------------------------------------------------------------------------------------------------  Assessment and Plan: 63yo M MHx Afib (non-compliant w/ Eliquis), HTN, hypothyroidism, who presents after 2 days of n/v, lightheadedness, gait difficulties, and dysarthria, found to have R cerebellar hemorrhage w/ surrounding edema & mild mass effect on the 4th ventricle on CTA  Admitted for ICU care  Neuro:    Diagnosis: R cerebellar hemorrhage w/ surrounding edema on CTA--etiology thromboembolic stroke w/ secondary hemorrhagic conversion vs hemorrhagic stroke vs cerebellar mass  CTA: 3x2 cm hemorrhage w/ mild surrounding perifocal edema in R Cerebellum extending to involve cerebellar vermis w/ mild mass effect on 4th ventricle    Plan:  · MRI brain w/ & w/o contrast   · SBP goal <160, cardene drip started  · Hold home eliquis in setting of hemorrhage  · NsGY, Neurology following  · Analgesia:  · Sedation: none  · Lipids, A1C, TSH  · Tele  · SLP when appropriate    CV:   Diagnosis: HTN  BP in -200's/110's  Per patient hasn't been taking any of his prescribed medications for over 1 year  Plan:  · SBP goal <160  · Cardene drip started  · Hydralazine 10mg IV, Labetalol 10mg IV PRN for SBP <160      Diagnosis: Afib w/ RVR  Home meds: Eliquis 5mg BID, pt non-compliant  Plan:  · Tele  · Holding eliquis in setting of hemorrhage  · Metoprolol 5mg IV q6PRN for HR >130 bpm        Pulm: no acute issues      GI:   · IV Zofran 4mg PRN  · Ulcer ppx: IV Pepcid while NPO  · Bowel regimen: PRN        : no acute issues      F/E/N:    Fluids: received 1L NS in ED   Electrolytes: monitor & replete as needed, so far lytes wnl   Nutrition: NPO      Heme/Onc: no acute issues  Hgb 18 0 likely 2/2 hypovolemia in setting of 2 days n/v  · Continue to monitor CBC  · DVT PPx: SCDs, no ACs in setting of hemorrhage      Endo:   Diagnosis: Hx Hypothyroidism  Home med: Levothyroxine per chart review, per patient not taking any of his prescribed meds for over 1 year  · Currently holding home meds    ID: no acute issues  WBC 8 83        MSK/Skin: no acute issues  o PT/OT when appropriate      Disposition: Admit to Critical Care   Code Status: No Order  --------------------------------------------------------------------------------------------------------------  Review of Systems   Constitutional: Negative  HENT: Negative  Eyes: Negative for photophobia and visual disturbance  Respiratory: Negative for cough and shortness of breath  Cardiovascular: Negative for chest pain  Gastrointestinal: Negative for abdominal distention, abdominal pain, diarrhea, nausea and vomiting  Genitourinary: Negative for difficulty urinating, frequency and urgency  Musculoskeletal: Positive for gait problem  Negative for arthralgias  Skin: Negative for pallor and rash  Neurological: Positive for dizziness, speech difficulty and light-headedness  Negative for tremors, syncope, weakness, numbness and headaches  Psychiatric/Behavioral: Negative for agitation and confusion  A 12-point, complete review of systems was reviewed and negative except as stated above     Physical Exam  Constitutional:       General: He is not in acute distress  HENT:      Head: Normocephalic and atraumatic  Mouth/Throat:      Mouth: Mucous membranes are dry  Cardiovascular:      Rate and Rhythm: Tachycardia present  Rhythm irregular  Pulses: Normal pulses  Heart sounds: Normal heart sounds  No murmur  No friction rub  No gallop  Pulmonary:      Effort: Pulmonary effort is normal       Breath sounds: No wheezing, rhonchi or rales  Comments: Decreased breath sounds b/l bases  Abdominal:      General: Bowel sounds are normal  There is no distension  Palpations: Abdomen is soft  Tenderness: There is no abdominal tenderness  Skin:     General: Skin is warm and dry  Capillary Refill: Capillary refill takes less than 2 seconds  Neurological:      Mental Status: He is alert and oriented to person, place, and time  Cranial Nerves: No cranial nerve deficit  Sensory: No sensory deficit  Motor: No weakness  Coordination: Coordination abnormal       Comments: RUE dysmetria on finger to nose, RLE heel to shin no dysmetria, dysarthric   Psychiatric:         Mood and Affect: Mood normal          Thought Content:  Thought content normal          Judgment: Judgment normal        --------------------------------------------------------------------------------------------------------------  Vitals:   Vitals:    08/19/20 1315 08/19/20 1345 08/19/20 1430 08/19/20 1500   BP: (!) 185/112 (!) 186/104 (!) 198/112 (!) 190/136   Pulse: 88 98 90 (!) 118   Resp: 20 18 18 19   Temp:       TempSrc:       SpO2: 95% 95% 97% 98%   Weight:       Height:         Temp Min: 98 3 °F (36 8 °C)  Max: 98 3 °F (36 8 °C)  IBW: 68 4 kg  Height: 5' 8" (172 7 cm)  Body mass index is 23 57 kg/m²  N/A    Laboratory and Diagnostics:  Results from last 7 days   Lab Units 08/19/20  1158   WBC Thousand/uL 8 83   HEMOGLOBIN g/dL 18 0*   HEMATOCRIT % 52 0*   PLATELETS Thousands/uL 162   NEUTROS PCT % 75   MONOS PCT % 8     Results from last 7 days   Lab Units 08/19/20  1158   SODIUM mmol/L 137   POTASSIUM mmol/L 3 8   CHLORIDE mmol/L 99*   CO2 mmol/L 32   ANION GAP mmol/L 6   BUN mg/dL 16   CREATININE mg/dL 1 07   CALCIUM mg/dL 8 9   GLUCOSE RANDOM mg/dL 143*   ALT U/L 46   AST U/L 38   ALK PHOS U/L 52   ALBUMIN g/dL 3 8   TOTAL BILIRUBIN mg/dL 1 12*          Results from last 7 days   Lab Units 08/19/20  1203   INR  1 00   PTT seconds 28      Results from last 7 days   Lab Units 08/19/20  1158   TROPONIN I ng/mL <0 02         ABG:    VBG:            EKG: Afib w/ RVR  Imaging:    CTA H/N:     FINDINGS:  NONCONTRAST BRAIN  PARENCHYMA:  There is a hemorrhagic lesion within the right cerebellar hemisphere extending to the cerebellar vermis, measuring about 3 1 cm x 2 2 cm  There is mild mass effect on the 4th ventricle  There is mild perifocal edema     VENTRICLES AND EXTRA-AXIAL SPACES:  Mild compression of the 4th ventricle  No hydrocephalus     IMPRESSION:     3 1 x 2 0 cm hemorrhage with mild surrounding perifocal edema in the right cerebellum extending to involve the cerebellar vermis may be hypertensive hemorrhage    However consider MRI to exclude any underlying lesion/neoplasm  No large vessel occlusion  Patent vertebrobasilar system   No significant carotid stenosis  No hydrocephalus      Historical Information   Past Medical History:   Diagnosis Date    A-fib ([address] )     Disease of thyroid gland     Hypertension      Past Surgical History:   Procedure Laterality Date    ROTATOR CUFF REPAIR       Social History   Social History     Substance and Sexual Activity   Alcohol Use Yes    Drinks per session: 3 or 4     Social History     Substance and Sexual Activity   Drug Use Not Currently     Social History     Tobacco Use   Smoking Status Current Every Day Smoker    Packs/day: 1 00    Types: Cigarettes   Smokeless Tobacco Never Used       Family History:   History reviewed  No pertinent family history  I have reviewed this patient's family history and commented on sigificant items within the HPI      Medications:  Current Facility-Administered Medications   Medication Dose Route Frequency    niCARdipine (CARDENE) 25 mg (STANDARD CONCENTRATION) in sodium chloride 0 9% 250 mL  1-15 mg/hr Intravenous Titrated     Home medications:  Prior to Admission Medications   Prescriptions Last Dose Informant Patient Reported? Taking? ELIQUIS 5 MG Not Taking at Unknown time  No No   Sig: TAKE 1 TABLET BY MOUTH TWICE A DAY   Patient not taking: Reported on 8/19/2020      Facility-Administered Medications: None     Allergies:  No Known Allergies  ------------------------------------------------------------------------------------------------------------  Advance Directive and Living Will:      Power of :    POLST:    ------------------------------------------------------------------------------------------------------------  Anticipated Length of Stay is > 2 midnights    Care Time Delivered:   60 mins      YADIRA Cho    Portions of the record may have been created with voice recognition software  Occasional wrong word or "sound a like" substitutions may have occurred due to the inherent limitations of voice recognition software    Read the chart carefully and recognize, using context, where substitutions have occurred

## 2020-08-19 NOTE — CONSULTS
Consultation - Neurology   Curtismajo Listen  60 y o  male MRN: [id number]  Unit/Bed#: ED 08 Encounter: [id number]    Assessment/Plan   Assessment/Plan:  60 year male with history of atrial fibrillation, hypertension, thyroid dysfunction, (noncompliant with medications for over the past year), tobacco and alcohol use, presenting with 2 days duration of dizziness, dysarthria, nausea and vomiting as well as gait instability/ambulatory dysfunction  His CTA head/neck this morning revealed a right cerebellar hemorrhagic lesion, to undergo workup for etiology (underlying ischemic infarction with subsequent hemorrhage given his history of AFib and noncompliance with Eliquis/anticoagulation, verses other mass lesion, verses hypertensive etiology)  ICH score of 1 (infratentorial/cerebellar)    1  Dizziness/vomiting, dysarthria, gait instability, right cerebellar hemorrhage:  -CTA/neck with right cerebellar hemorrhage with extension into the cerebellar vermis/mild compression on 4th ventricle  -would pursue MRI brain with and without contrast to further evaluate hemorrhage as well as any other underlying pathology or postcontrast enhancement  -patient would also benefit from CT of the chest abdomen and pelvis at some point during admission to rule out primary malignancy (given his extensive smoking and alcohol abuse)  -neurosurgery consult pending  -ICU monitoring for neurologic exam monitoring given posterior circulation edema  -hold anticoagulation/antiplatelet given hemorrhage  -defer specific SBP parameters to Neurosurgery   -Cardene infusion started  -supportive care  -therapy evaluations    2   Atrial fibrillation:  -on EKG  -has been noncompliant with Eliquis and antihypertensives  -holding AC due to #1     Discussed plan of care with attending neurologist      History of Present Illness     Reason for Consult / Principal Problem:  Dizziness, gait instability, dysarthria, vomiting, right cerebellar hemorrhage    HPI: Oneil Randhawa  is a 60 y o  male with history as mentioned above in assessment who neurology is asked to evaluate in regards to constellation of neurologic symptoms and identification of right cerebellar hemorrhage this morning  Upon discussion with patient, beginning Monday night he noticed onset of a headache as well as dizziness described as room spinning and associated gait instability  He was also nauseous and vomited on Monday night  His speech was also dysarthric compared to baseline  Symptoms continued Tuesday, leading to him staying in bed most of the day  Symptoms also persisted today/this morning  Given his symptoms he presented to the ED this morning for evaluation  He was significantly hypertensive on presentation with blood pressure of 189/112 (has also been quite hypertensive throughout the course of the afternoon), placed on a Cardene infusion  He had CTA head and neck performed which revealed a left cerebellar hemorrhage, initial ED exam was notable for some cerebellar dysfunction with ataxic appearing finger-to-nose and heel-to-shin  Upon reviewing notes in epic, he previously followed with Cardiology team in 2016 for persistent AFib, has been on Eliquis  Patient does confirm he has been noncompliant with his Eliquis and antihypertensives for the past approximately year and a half (denies any recent use of medications)    Continues to smoke half pack per day, drinks approximately 3-4 drinks daily  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[address])  No other recent infectious symptoms leading up to today  Consult to neurology  Consult performed by: Thana Buerger, PA-C  Consult ordered by: Glenroy Valle DO          Review of Systems   Constitutional: Negative  HENT: Negative  Eyes: Negative  Respiratory: Negative  Cardiovascular: Negative  Gastrointestinal: Positive for nausea and vomiting  Musculoskeletal: Positive for gait problem   Negative for neck pain and neck stiffness  Skin: Negative  Neurological: Positive for dizziness, speech difficulty, light-headedness, numbness and headaches  Negative for tremors, seizures, syncope, facial asymmetry and weakness  All other ROS reviewed and negative  Historical Information   Past Medical History:   Diagnosis Date    A[facility] [address] [facility]     Disease of thyroid gland     Hypertension      Past Surgical History:   Procedure Laterality Date    ROTATOR CUFF REPAIR       Social History   Social History     Substance and Sexual Activity   Alcohol Use Yes    Drinks per session: 3 or 4     Social History     Substance and Sexual Activity   Drug Use Not Currently     E-Cigarette/Vaping    E-Cigarette Use Never User      E-Cigarette/Vaping Substances     Social History     Tobacco Use   Smoking Status Current Every Day Smoker    Packs/day: 1 00    Types: Cigarettes   Smokeless Tobacco Never Used     Family History: History reviewed  No pertinent family history  Review of previous medical records was completed  Meds/Allergies   current meds:   Current Facility-Administered Medications   Medication Dose Route Frequency    niCARdipine (CARDENE) 25 mg (STANDARD CONCENTRATION) in sodium chloride 0 9% 250 mL  1-15 mg/hr Intravenous Titrated    and PTA meds:   Prior to Admission Medications   Prescriptions Last Dose Informant Patient Reported? Taking? ELIQUIS 5 MG Not Taking at Unknown time  No No   Sig: TAKE 1 TABLET BY MOUTH TWICE A DAY   Patient not taking: Reported on 8/19/2020      Facility-Administered Medications: None       No Known Allergies    Objective   Vitals:Blood pressure (!) 190/136, pulse (!) 118, temperature 98 3 °F (36 8 °C), temperature source Oral, resp  rate 19, height 5' 8" (1 727 m), weight 70 3 kg (155 lb), SpO2 98 %  ,Body mass index is 23 57 kg/m²      Intake/Output Summary (Last 24 hours) at 8/19/2020 1514  Last data filed at 8/19/2020 1447  Gross per 24 hour   Intake 1000 ml   Output  Net 1000 ml       Invasive Devices: Invasive Devices     Peripheral Intravenous Line            Peripheral IV 08/19/20 Distal;Dorsal (posterior); Left Forearm less than 1 day    Peripheral IV 08/19/20 Left Antecubital less than 1 day                Physical Exam  Constitutional:       Appearance: Normal appearance  HENT:      Head: Normocephalic and atraumatic  Eyes:      Extraocular Movements: Extraocular movements intact  Conjunctiva/sclera: Conjunctivae normal       Pupils: Pupils are equal, round, and reactive to light  Neck:      Musculoskeletal: Normal range of motion and neck supple  Cardiovascular:      Rate and Rhythm: Tachycardia present  Pulmonary:      Effort: Pulmonary effort is normal       Breath sounds: Normal breath sounds  Musculoskeletal: Normal range of motion  Skin:     General: Skin is warm and dry  Neurological:      Mental Status: He is alert  Neurologic Exam     Mental Status   Awake and alert, oriented  Speech is mildly dysarthric, no aphasia with conversation  Following commands fluently  Cranial Nerves     CN III, IV, VI   Pupils are equal, round, and reactive to light  Pupils equal and reactive, EOMs are full, slight end gaze nystagmus bilaterally, nonsustained  Visual fields full, symmetric smile as well as symmetric facial sensation to light touch  Midline tongue protrusion  Motor Exam   Muscle bulk: normal  Overall muscle tone: normal  Right arm pronator drift: absent  Left arm pronator drift: absent  He has 5/5 strength throughout the upper and lower extremities  No focal motor deficit or laterality on exam      Sensory Exam     Symmetric light touch as well as pinprick and temperature sensation throughout the proximal extremities  Gait, Coordination, and Reflexes   He is ataxic on the right side with cerebellar testing (finger-to-nose worse than heel-to-shin)  No clear upgoing toe or ankle clonus    Diminished to normal DTRs throughout  Gait was not formally assessed given dizziness with standing  Lab Results:   CBC:   Results from last 7 days   Lab Units 08/19/20  1158   WBC Thousand/uL 8 83   RBC Million/uL 5 08   HEMOGLOBIN g/dL 18 0*   HEMATOCRIT % 52 0*   MCV fL 102*   PLATELETS Thousands/uL 162   , BMP/CMP:   Results from last 7 days   Lab Units 08/19/20  1158   SODIUM mmol/L 137   POTASSIUM mmol/L 3 8   CHLORIDE mmol/L 99*   CO2 mmol/L 32   BUN mg/dL 16   CREATININE mg/dL 1 07   CALCIUM mg/dL 8 9   EGFR ml/min/1 73sq m 74   , Vitamin B12:   , HgBA1C:   , TSH:   , Coagulation:   Results from last 7 days   Lab Units 08/19/20  1203   INR  1 00   , Lipid Profile:   , Ammonia:   , Urinalysis:       Invalid input(s): URIBILINOGEN, Drug Screen:   , Medication Drug Levels:       Invalid input(s): CARBAMAZEPINE,  PHENOBARB, LACOSAMIDE, OXCARBAZEPINE  Imaging Studies: I have personally reviewed pertinent films in PACS   CTA head/neck 08/19/2020:    3 1 x 2 0 cm hemorrhage with mild surrounding perifocal edema in the right cerebellum extending to involve the cerebellar vermis may be hypertensive hemorrhage  However consider MRI to exclude any underlying lesion/neoplasm  No large vessel occlusion  Patent vertebrobasilar system   No significant carotid stenosis  No hydrocephalus    EKG, Pathology, and Other Studies: I have personally reviewed pertinent reports  VTE Prophylaxis: Sequential compression device (Venodyne)  and Reason for no pharmacologic prophylaxis ICH    Code Status: No Order    Total time spent today 50 minutes  Discussed plan of care with patient:  Showed patient CTA imaging, need MRI brain to further evaluate etiology as well as other hemorrhage workup  Holding AP/AC, blood pressure management with Cardene ongoing, close neuro exam monitoring, therapies, ICU management

## 2020-08-19 NOTE — CONSULTS
Consult- eVga Height  1955, 60 y o  male MRN: [id number]    Unit/Bed#: ICU 02 Encounter: [id number]    Primary Care Provider: Aaron Bhatia MD   Date and time admitted to hospital: 8/19/2020 11:37 AM    Consult was completed on 08/19/2020 at 3:00 p m  Inpatient consult to Neurosurgery  Consult performed by: ROSALINA Scott  Consult ordered by: Jacinto Can DO          * Cerebellar hemorrhage [facility])  Assessment & Plan  Patient with 2 day history of dizziness, dysarthria, nausea, vomiting and gait difficulties  Patient with history of hypertension and AFib on Eliquis, patient very noncompliant with medications  Right cerebellum hemorrhage with mild surrounding edema with mass effect on 4th ventricle  (Hemorrhagic infarct vs lesion)    Imaging:    CTA head and neck 08/19/2020:3 1 x 2 0 cm hemorrhage with mild surrounding perifocal edema in the right cerebellum extending to involve the cerebellar vermis may be hypertensive hemorrhage  No large vessel occlusion  Patent vertebrobasilar system   No significant carotid stenosis  No hydrocephalus  Plan:  · Continue frequent neurological checks  · Reviewed imaging with patient  · STAT CT head with any neurological decline including drop GCS of 2pts within 1 hr   · Ordered MRI brain w wo and Bravo sequence to further evaluate hemorrhage and possible other underlying pathology  · Recommend SBP <160mmHg  · Patient was started on Cardene infusion for blood pressure control  · No Keppra recommended given area of bleed  · Hold all antiplatelet and anticoagulation medications  Continue to hold home Eliquis  · Medical management and pain control per primary team  · No neurosurgical intervention indicated at this juncture  · Neurology following, input appreciated  · PT/OT eval  · DVT PPX: SCDs only at this time  Continue to hold pharmacological DVT prophylaxis secondary to cerebellar hemorrhage      Neurosurgery will continue to follow, pending MRI brain, call with any questions or concerns  History of Present Illness     HPI: Vega Yañez  is a 60 y o male with PMH significant for hypertension, hypothyroidism, AFib on Eliquis but not compliant, who presented to the ED today after 2 day history of nausea, vomiting, dizziness, dysarthria, and gait difficulties  He was found to have right cerebellar hemorrhage with mild surrounding edema with mass effect on the 4th ventricle  Patient reports he has been non compliant with his medication for blood pressure and AFib for roughly 1 year  Patient states that all symptoms started Monday night  Patient noticed onset of a headache with associated dizziness and room spinning, patient was unable to ambulate  He did report he was nauseous and vomited  Per patient and patient's wife his speech has not been baseline since Monday  Patient states symptoms continue Tuesday and he reports lying in bed most of the day  Symptoms persisted which warranted him calling EMS today to be brought into the ED for further evaluation  Patient states he lives at home with his wife and is still actively working at [employer] and [employer]  Review of Systems   Constitutional: Positive for activity change  Negative for chills and fever  HENT: Negative for tinnitus and trouble swallowing  Eyes: Negative for visual disturbance  Respiratory: Negative for shortness of breath  Cardiovascular: Negative for chest pain  Gastrointestinal: Positive for nausea and vomiting  Negative for abdominal pain, constipation and diarrhea  Genitourinary: Negative for difficulty urinating  Musculoskeletal: Positive for gait problem  Negative for back pain and neck pain  Neurological: Positive for dizziness, speech difficulty, light-headedness and headaches  Negative for weakness and numbness  Psychiatric/Behavioral: Negative for confusion         Historical Information   Past Medical History:   Diagnosis Date    A[facility]fib ([facility] [address] )     Disease of thyroid gland     Hypertension      Past Surgical History:   Procedure Laterality Date    ROTATOR CUFF REPAIR       Social History     Substance and Sexual Activity   Alcohol Use Yes    Drinks per session: 3 or 4     Social History     Substance and Sexual Activity   Drug Use Not Currently     Social History     Tobacco Use   Smoking Status Current Every Day Smoker    Packs/day: 0 50    Types: Cigarettes   Smokeless Tobacco Never Used     History reviewed  No pertinent family history  Meds/Allergies   all current active meds have been reviewed, current meds:   Current Facility-Administered Medications   Medication Dose Route Frequency    esmolol (BREVIBLOC) 2500 mg/250 mL IV infusion (premix)   mcg/kg/min Intravenous Titrated    hydrALAZINE (APRESOLINE) injection 10 mg  10 mg Intravenous Q6H PRN    niCARdipine (CARDENE) 25 mg (STANDARD CONCENTRATION) in sodium chloride 0 9% 250 mL  1-15 mg/hr Intravenous Titrated    ondansetron (ZOFRAN) injection 4 mg  4 mg Intravenous Q6H PRN    and PTA meds:   Prior to Admission Medications   Prescriptions Last Dose Informant Patient Reported? Taking? ELIQUIS 5 MG Not Taking at Unknown time  No No   Sig: TAKE 1 TABLET BY MOUTH TWICE A DAY   Patient not taking: Reported on 8/19/2020      Facility-Administered Medications: None     No Known Allergies    Objective   I/O       08/17 0701 - 08/18 0700 08/18 0701 - 08/19 0700 08/19 0701 - 08/20 0700    IV Piggyback   1000    Total Intake(mL/kg)   1000 (14 5)    Net   +1000                 Physical Exam  Constitutional:       General: He is not in acute distress  Appearance: Normal appearance  He is well-developed  HENT:      Head: Normocephalic and atraumatic  Eyes:      Extraocular Movements: Extraocular movements intact  Right eye: Nystagmus present  Left eye: Nystagmus present  Pupils: Pupils are equal, round, and reactive to light  Neck:      Musculoskeletal: Normal range of motion and neck supple  No spinous process tenderness or muscular tenderness  Cardiovascular:      Rate and Rhythm: Tachycardia present  Pulmonary:      Effort: Pulmonary effort is normal  No respiratory distress  Chest:      Chest wall: No tenderness  Abdominal:      General: There is no distension  Palpations: Abdomen is soft  Tenderness: There is no abdominal tenderness  Musculoskeletal: Normal range of motion  General: No swelling or tenderness  Cervical back: He exhibits no tenderness  Thoracic back: He exhibits no tenderness  Lumbar back: He exhibits no tenderness  Skin:     General: Skin is warm and dry  Neurological:      Mental Status: He is alert and oriented to person, place, and time  Motor: No weakness  Coordination: Finger-Nose-Finger Test abnormal (Right-sided mild dysmetria with finger-to-nose)  Deep Tendon Reflexes: Strength normal       Reflex Scores:       Tricep reflexes are 1+ on the right side and 1+ on the left side  Bicep reflexes are 1+ on the right side and 1+ on the left side  Brachioradialis reflexes are 1+ on the right side and 1+ on the left side  Patellar reflexes are 1+ on the right side and 1+ on the left side  Achilles reflexes are 1+ on the right side and 1+ on the left side  Psychiatric:         Attention and Perception: Attention and perception normal          Mood and Affect: Mood and affect normal          Behavior: Behavior normal  Behavior is cooperative  Thought Content: Thought content normal          Cognition and Memory: Cognition and memory normal          Judgment: Judgment normal        Neurologic Exam     Mental Status   Oriented to person, place, and time  Registration: recalls 3 of 3 objects  Recall at 5 minutes: recalls 2 of 3 objects  Follows 2 step commands     Attention: normal  Concentration: normal    Speech: (Mild dysarthria)  Level of consciousness: alert  Knowledge: good  Able to perform simple calculations  Able to name object  Able to repeat  Normal comprehension  Cranial Nerves     CN II   Right visual field deficit: none  Left visual field deficit: none     CN III, IV, VI   Pupils are equal, round, and reactive to light  CN III: no CN III palsy  CN VI: no CN VI palsy  Nystagmus: none   Diplopia: none  Conjugate gaze: present    CN V   Facial sensation intact  CN VII   Facial expression full, symmetric  CN VIII   CN VIII normal    Hearing: intact    CN IX, X   CN IX normal      CN XI   CN XI normal      CN XII   CN XII normal    Bilateral nystagmus noted     Motor Exam   Muscle bulk: normal  Overall muscle tone: normal  Right arm pronator drift: absent  Left arm pronator drift: absent    Strength   Strength 5/5 throughout  Sensory Exam   Light touch normal    Proprioception normal    JPS and DST intact     Gait, Coordination, and Reflexes     Coordination   Finger to nose coordination: abnormal (Right-sided mild dysmetria with finger-to-nose)    Tremor   Resting tremor: absent  Intention tremor: absent  Action tremor: absent    Reflexes   Right brachioradialis: 1+  Left brachioradialis: 1+  Right biceps: 1+  Left biceps: 1+  Right triceps: 1+  Left triceps: 1+  Right patellar: 1+  Left patellar: 1+  Right achilles: 1+  Left achilles: 1+  Right Mederos: absent  Left Mederos: absent  Right ankle clonus: absent  Left ankle clonus: absent      Vitals:Blood pressure 153/95, pulse 92, temperature 98 1 °F (36 7 °C), temperature source Oral, resp  rate (!) 23, height 5' 8" (1 727 m), weight 68 9 kg (151 lb 14 4 oz), SpO2 97 %  ,Body mass index is 23 1 kg/m²       Lab Results:   Results from last 7 days   Lab Units 08/19/20  1158   WBC Thousand/uL 8 83   HEMOGLOBIN g/dL 18 0*   HEMATOCRIT % 52 0*   PLATELETS Thousands/uL 162   NEUTROS PCT % 75   MONOS PCT % 8     Results from last 7 days   Lab Units 08/19/20  1158   POTASSIUM mmol/L 3 8   CHLORIDE mmol/L 99*   CO2 mmol/L 32   BUN mg/dL 16   CREATININE mg/dL 1 07   CALCIUM mg/dL 8 9   ALK PHOS U/L 52   ALT U/L 46   AST U/L 38             Results from last 7 days   Lab Units 08/19/20  1203   INR  1 00   PTT seconds 28     No results found for: TROPONINT  ABG:No results found for: PHART, VZB2DZY, PO2ART, LHK7KCV, L6YKSQYH, BEART, SOURCE    Imaging Studies: I have personally reviewed pertinent reports  and I have personally reviewed pertinent films in PACS    EKG, Pathology, and Other Studies: I have personally reviewed pertinent reports        VTE Prophylaxis: Sequential compression device (Venodyne)  and Reason for no pharmacologic prophylaxis Secondary to cerebellar hemorrhage    Code Status: Level 1 - Full Code  Advance Directive and Living Will:      Power of :    POLST:

## 2020-08-19 NOTE — ASSESSMENT & PLAN NOTE
Patient with 2 day history of dizziness, dysarthria, nausea, vomiting and gait difficulties  Patient with history of hypertension and AFib on Eliquis, patient very noncompliant with medications  Right cerebellum hemorrhage with mild surrounding edema with mass effect on 4th ventricle  (Hemorrhagic infarct vs lesion)    Imaging:    CTA head and neck 08/19/2020:3 1 x 2 0 cm hemorrhage with mild surrounding perifocal edema in the right cerebellum extending to involve the cerebellar vermis may be hypertensive hemorrhage  No large vessel occlusion  Patent vertebrobasilar system   No significant carotid stenosis  No hydrocephalus  Plan:  · Continue frequent neurological checks  · Reviewed imaging with patient  · STAT CT head with any neurological decline including drop GCS of 2pts within 1 hr   · Ordered MRI brain w wo and Bravo sequence to further evaluate hemorrhage and possible other underlying pathology  · Recommend SBP <160mmHg  · Patient was started on Cardene infusion for blood pressure control  · No Keppra recommended given area of bleed  · Hold all antiplatelet and anticoagulation medications  Continue to hold home Eliquis  · Medical management and pain control per primary team  · No neurosurgical intervention indicated at this juncture  · Neurology following, input appreciated  · PT/OT eval  · DVT PPX: SCDs only at this time  Continue to hold pharmacological DVT prophylaxis secondary to cerebellar hemorrhage  Neurosurgery will continue to follow, pending MRI brain, call with any questions or concerns

## 2020-08-20 ENCOUNTER — APPOINTMENT (INPATIENT)
Dept: RADIOLOGY | Facility: HOSPITAL | Age: 65
DRG: 064 | End: 2020-08-20
Payer: COMMERCIAL

## 2020-08-20 ENCOUNTER — APPOINTMENT (INPATIENT)
Dept: NON INVASIVE DIAGNOSTICS | Facility: HOSPITAL | Age: 65
DRG: 064 | End: 2020-08-20
Payer: COMMERCIAL

## 2020-08-20 PROBLEM — I48.91 A-FIB (HCC): Status: ACTIVE | Noted: 2020-08-20

## 2020-08-20 PROBLEM — I10 HTN (HYPERTENSION): Status: ACTIVE | Noted: 2020-08-20

## 2020-08-20 LAB
ANION GAP SERPL CALCULATED.3IONS-SCNC: 7 MMOL/L (ref 4–13)
ATRIAL RATE: 108 BPM
BASOPHILS # BLD AUTO: 0.05 THOUSANDS/ΜL (ref 0–0.1)
BASOPHILS NFR BLD AUTO: 1 % (ref 0–1)
BUN SERPL-MCNC: 13 MG/DL (ref 5–25)
CALCIUM SERPL-MCNC: 8.6 MG/DL (ref 8.3–10.1)
CHLORIDE SERPL-SCNC: 100 MMOL/L (ref 100–108)
CO2 SERPL-SCNC: 30 MMOL/L (ref 21–32)
CREAT SERPL-MCNC: 0.74 MG/DL (ref 0.6–1.3)
EOSINOPHIL # BLD AUTO: 0.08 THOUSAND/ΜL (ref 0–0.61)
EOSINOPHIL NFR BLD AUTO: 1 % (ref 0–6)
ERYTHROCYTE [DISTWIDTH] IN BLOOD BY AUTOMATED COUNT: 12.9 % (ref 11.6–15.1)
GFR SERPL CREATININE-BSD FRML MDRD: 99 ML/MIN/1.73SQ M
GLUCOSE SERPL-MCNC: 80 MG/DL (ref 65–140)
HCT VFR BLD AUTO: 48.5 % (ref 36.5–49.3)
HGB BLD-MCNC: 17.5 G/DL (ref 12–17)
IMM GRANULOCYTES # BLD AUTO: 0.03 THOUSAND/UL (ref 0–0.2)
IMM GRANULOCYTES NFR BLD AUTO: 0 % (ref 0–2)
LYMPHOCYTES # BLD AUTO: 1.75 THOUSANDS/ΜL (ref 0.6–4.47)
LYMPHOCYTES NFR BLD AUTO: 21 % (ref 14–44)
MAGNESIUM SERPL-MCNC: 2.4 MG/DL (ref 1.6–2.6)
MCH RBC QN AUTO: 36.5 PG (ref 26.8–34.3)
MCHC RBC AUTO-ENTMCNC: 36.1 G/DL (ref 31.4–37.4)
MCV RBC AUTO: 101 FL (ref 82–98)
MONOCYTES # BLD AUTO: 0.69 THOUSAND/ΜL (ref 0.17–1.22)
MONOCYTES NFR BLD AUTO: 8 % (ref 4–12)
NEUTROPHILS # BLD AUTO: 5.77 THOUSANDS/ΜL (ref 1.85–7.62)
NEUTS SEG NFR BLD AUTO: 69 % (ref 43–75)
NRBC BLD AUTO-RTO: 0 /100 WBCS
PLATELET # BLD AUTO: 153 THOUSANDS/UL (ref 149–390)
PMV BLD AUTO: 10.4 FL (ref 8.9–12.7)
POTASSIUM SERPL-SCNC: 3.3 MMOL/L (ref 3.5–5.3)
QRS AXIS: 84 DEGREES
QRSD INTERVAL: 83 MS
QT INTERVAL: 333 MS
QTC INTERVAL: 447 MS
RBC # BLD AUTO: 4.79 MILLION/UL (ref 3.88–5.62)
SODIUM SERPL-SCNC: 137 MMOL/L (ref 136–145)
T WAVE AXIS: 76 DEGREES
VENTRICULAR RATE: 108 BPM
WBC # BLD AUTO: 8.37 THOUSAND/UL (ref 4.31–10.16)

## 2020-08-20 PROCEDURE — 93010 ELECTROCARDIOGRAM REPORT: CPT | Performed by: INTERNAL MEDICINE

## 2020-08-20 PROCEDURE — 93306 TTE W/DOPPLER COMPLETE: CPT

## 2020-08-20 PROCEDURE — 85025 COMPLETE CBC W/AUTO DIFF WBC: CPT | Performed by: EMERGENCY MEDICINE

## 2020-08-20 PROCEDURE — 97166 OT EVAL MOD COMPLEX 45 MIN: CPT

## 2020-08-20 PROCEDURE — 93005 ELECTROCARDIOGRAM TRACING: CPT

## 2020-08-20 PROCEDURE — 83735 ASSAY OF MAGNESIUM: CPT | Performed by: EMERGENCY MEDICINE

## 2020-08-20 PROCEDURE — 93306 TTE W/DOPPLER COMPLETE: CPT | Performed by: INTERNAL MEDICINE

## 2020-08-20 PROCEDURE — G1004 CDSM NDSC: HCPCS

## 2020-08-20 PROCEDURE — 70553 MRI BRAIN STEM W/O & W/DYE: CPT

## 2020-08-20 PROCEDURE — A9585 GADOBUTROL INJECTION: HCPCS | Performed by: NURSE PRACTITIONER

## 2020-08-20 PROCEDURE — 92610 EVALUATE SWALLOWING FUNCTION: CPT

## 2020-08-20 PROCEDURE — NC001 PR NO CHARGE

## 2020-08-20 PROCEDURE — 99233 SBSQ HOSP IP/OBS HIGH 50: CPT | Performed by: EMERGENCY MEDICINE

## 2020-08-20 PROCEDURE — 99232 SBSQ HOSP IP/OBS MODERATE 35: CPT | Performed by: PSYCHIATRY & NEUROLOGY

## 2020-08-20 PROCEDURE — 99232 SBSQ HOSP IP/OBS MODERATE 35: CPT | Performed by: NEUROLOGICAL SURGERY

## 2020-08-20 PROCEDURE — 80048 BASIC METABOLIC PNL TOTAL CA: CPT | Performed by: EMERGENCY MEDICINE

## 2020-08-20 PROCEDURE — 70450 CT HEAD/BRAIN W/O DYE: CPT

## 2020-08-20 RX ORDER — POTASSIUM CHLORIDE 14.9 MG/ML
20 INJECTION INTRAVENOUS
Status: DISCONTINUED | OUTPATIENT
Start: 2020-08-20 | End: 2020-08-20

## 2020-08-20 RX ORDER — POTASSIUM CHLORIDE 20 MEQ/1
40 TABLET, EXTENDED RELEASE ORAL ONCE
Status: COMPLETED | OUTPATIENT
Start: 2020-08-20 | End: 2020-08-20

## 2020-08-20 RX ORDER — LISINOPRIL 10 MG/1
10 TABLET ORAL DAILY
Status: DISCONTINUED | OUTPATIENT
Start: 2020-08-20 | End: 2020-08-20

## 2020-08-20 RX ORDER — POTASSIUM CHLORIDE 20 MEQ/1
20 TABLET, EXTENDED RELEASE ORAL ONCE
Status: CANCELLED | OUTPATIENT
Start: 2020-08-20

## 2020-08-20 RX ORDER — LISINOPRIL 10 MG/1
10 TABLET ORAL DAILY
Status: DISCONTINUED | OUTPATIENT
Start: 2020-08-20 | End: 2020-08-24 | Stop reason: HOSPADM

## 2020-08-20 RX ORDER — ACETAMINOPHEN 325 MG/1
650 TABLET ORAL EVERY 6 HOURS PRN
Status: DISCONTINUED | OUTPATIENT
Start: 2020-08-20 | End: 2020-08-24 | Stop reason: HOSPADM

## 2020-08-20 RX ORDER — LABETALOL 20 MG/4 ML (5 MG/ML) INTRAVENOUS SYRINGE
10 EVERY 6 HOURS PRN
Status: DISCONTINUED | OUTPATIENT
Start: 2020-08-20 | End: 2020-08-24 | Stop reason: HOSPADM

## 2020-08-20 RX ADMIN — POTASSIUM CHLORIDE 40 MEQ: 1500 TABLET, EXTENDED RELEASE ORAL at 12:12

## 2020-08-20 RX ADMIN — ACETAMINOPHEN 650 MG: 325 TABLET, FILM COATED ORAL at 10:39

## 2020-08-20 RX ADMIN — GADOBUTROL 6 ML: 604.72 INJECTION INTRAVENOUS at 01:30

## 2020-08-20 RX ADMIN — POTASSIUM CHLORIDE 20 MEQ: 14.9 INJECTION, SOLUTION INTRAVENOUS at 10:01

## 2020-08-20 RX ADMIN — POTASSIUM CHLORIDE 40 MEQ: 1500 TABLET, EXTENDED RELEASE ORAL at 09:47

## 2020-08-20 RX ADMIN — LABETALOL 20 MG/4 ML (5 MG/ML) INTRAVENOUS SYRINGE 10 MG: at 10:45

## 2020-08-20 RX ADMIN — HYDRALAZINE HYDROCHLORIDE 10 MG: 20 INJECTION INTRAMUSCULAR; INTRAVENOUS at 03:14

## 2020-08-20 RX ADMIN — LISINOPRIL 10 MG: 10 TABLET ORAL at 09:47

## 2020-08-20 RX ADMIN — METOPROLOL TARTRATE 25 MG: 25 TABLET, FILM COATED ORAL at 09:47

## 2020-08-20 RX ADMIN — METOPROLOL TARTRATE 25 MG: 25 TABLET, FILM COATED ORAL at 23:40

## 2020-08-20 NOTE — PLAN OF CARE
Problem: Nutrition/Hydration-ADULT  Goal: Nutrient/Hydration intake appropriate for improving, restoring or maintaining nutritional needs  Description: Monitor and assess patient's nutrition/hydration status for malnutrition  Collaborate with interdisciplinary team and initiate plan and interventions as ordered  Monitor patient's weight and dietary intake as ordered or per policy  Utilize nutrition screening tool and intervene as necessary  Determine patient's food preferences and provide high-protein, high-caloric foods as appropriate       INTERVENTIONS:  - Monitor oral intake, urinary output, labs, and treatment plans  - Assess nutrition and hydration status and recommend course of action  - Evaluate amount of meals eaten  - Assist patient with eating if necessary   - Allow adequate time for meals  - Recommend/ encourage appropriate diets, oral nutritional supplements, and vitamin/mineral supplements  - Order, calculate, and assess calorie counts as needed  - Recommend, monitor, and adjust tube feedings and TPN/PPN based on assessed needs  - Assess need for intravenous fluids  - Provide specific nutrition/hydration education as appropriate  - Include patient/family/caregiver in decisions related to nutrition  Outcome: Progressing     Problem: NEUROSENSORY - ADULT  Goal: Achieves stable or improved neurological status  Description: INTERVENTIONS  - Monitor and report changes in neurological status  - Monitor vital signs such as temperature, blood pressure, glucose, and any other labs ordered   - Initiate measures to prevent increased intracranial pressure  - Monitor for seizure activity and implement precautions if appropriate      Outcome: Progressing  Goal: Remains free of injury related to seizures activity  Description: INTERVENTIONS  - Maintain airway, patient safety  and administer oxygen as ordered  - Monitor patient for seizure activity, document and report duration and description of seizure to physician/advanced practitioner  - If seizure occurs,  ensure patient safety during seizure  - Reorient patient post seizure  - Seizure pads on all 4 side rails  - Instruct patient/family to notify RN of any seizure activity including if an aura is experienced  - Instruct patient/family to call for assistance with activity based on nursing assessment  - Administer anti-seizure medications if ordered    Outcome: Progressing  Goal: Achieves maximal functionality and self care  Description: INTERVENTIONS  - Monitor swallowing and airway patency with patient fatigue and changes in neurological status  - Encourage and assist patient to increase activity and self care     - Encourage visually impaired, hearing impaired and aphasic patients to use assistive/communication devices  Outcome: Progressing     Problem: CARDIOVASCULAR - ADULT  Goal: Maintains optimal cardiac output and hemodynamic stability  Description: INTERVENTIONS:  - Monitor I/O, vital signs and rhythm  - Monitor for S/S and trends of decreased cardiac output  - Administer and titrate ordered vasoactive medications to optimize hemodynamic stability  - Assess quality of pulses, skin color and temperature  - Assess for signs of decreased coronary artery perfusion  - Instruct patient to report change in severity of symptoms  Outcome: Progressing  Goal: Absence of cardiac dysrhythmias or at baseline rhythm  Description: INTERVENTIONS:  - Continuous cardiac monitoring, vital signs, obtain 12 lead EKG if ordered  - Administer antiarrhythmic and heart rate control medications as ordered  - Monitor electrolytes and administer replacement therapy as ordered  Outcome: Progressing     Problem: GASTROINTESTINAL - ADULT  Goal: Minimal or absence of nausea and/or vomiting  Description: INTERVENTIONS:  - Administer IV fluids if ordered to ensure adequate hydration  - Maintain NPO status until nausea and vomiting are resolved  - Nasogastric tube if ordered  - Administer ordered antiemetic medications as needed  - Provide nonpharmacologic comfort measures as appropriate  - Advance diet as tolerated, if ordered  - Consider nutrition services referral to assist patient with adequate nutrition and appropriate food choices  Outcome: Progressing     Problem: GENITOURINARY - ADULT  Goal: Maintains or returns to baseline urinary function  Description: INTERVENTIONS:  - Assess urinary function  - Encourage oral fluids to ensure adequate hydration if ordered  - Administer IV fluids as ordered to ensure adequate hydration  - Administer ordered medications as needed  - Offer frequent toileting  - Follow urinary retention protocol if ordered  Outcome: Progressing  Goal: Absence of urinary retention  Description: INTERVENTIONS:  - Assess patients ability to void and empty bladder  - Monitor I/O  - Bladder scan as needed  - Discuss with physician/AP medications to alleviate retention as needed  - Discuss catheterization for long term situations as appropriate  Outcome: Progressing     Problem: METABOLIC, FLUID AND ELECTROLYTES - ADULT  Goal: Electrolytes maintained within normal limits  Description: INTERVENTIONS:  - Monitor labs and assess patient for signs and symptoms of electrolyte imbalances  - Administer electrolyte replacement as ordered  - Monitor response to electrolyte replacements, including repeat lab results as appropriate  - Instruct patient on fluid and nutrition as appropriate  Outcome: Progressing  Goal: Fluid balance maintained  Description: INTERVENTIONS:  - Monitor labs   - Monitor I/O and WT  - Instruct patient on fluid and nutrition as appropriate  - Assess for signs & symptoms of volume excess or deficit  Outcome: Progressing  Goal: Glucose maintained within target range  Description: INTERVENTIONS:  - Monitor Blood Glucose as ordered  - Assess for signs and symptoms of hyperglycemia and hypoglycemia  - Administer ordered medications to maintain glucose within target range  - Assess nutritional intake and initiate nutrition service referral as needed  Outcome: Progressing     Problem: SKIN/TISSUE INTEGRITY - ADULT  Goal: Skin integrity remains intact  Description: INTERVENTIONS  - Identify patients at risk for skin breakdown  - Assess and monitor skin integrity  - Assess and monitor nutrition and hydration status  - Monitor labs (i e  albumin)  - Assess for incontinence   - Turn and reposition patient  - Assist with mobility/ambulation  - Relieve pressure over bony prominences  - Avoid friction and shearing  - Provide appropriate hygiene as needed including keeping skin clean and dry  - Evaluate need for skin moisturizer/barrier cream  - Collaborate with interdisciplinary team (i e  Nutrition, Rehabilitation, etc )   - Patient/family teaching  Outcome: Progressing  Goal: Oral mucous membranes remain intact  Description: INTERVENTIONS  - Assess oral mucosa and hygiene practices  - Implement preventative oral hygiene regimen  - Implement oral medicated treatments as ordered  - Initiate Nutrition services referral as needed  Outcome: Progressing     Problem: MUSCULOSKELETAL - ADULT  Goal: Maintain or return mobility to safest level of function  Description: INTERVENTIONS:  - Assess patient's ability to carry out ADLs; assess patient's baseline for ADL function and identify physical deficits which impact ability to perform ADLs (bathing, care of mouth/teeth, toileting, grooming, dressing, etc )  - Assess/evaluate cause of self-care deficits   - Assess range of motion  - Assess patient's mobility  - Assess patient's need for assistive devices and provide as appropriate  - Encourage maximum independence but intervene and supervise when necessary  - Involve family in performance of ADLs  - Assess for home care needs following discharge   - Consider OT consult to assist with ADL evaluation and planning for discharge  - Provide patient education as appropriate  Outcome: Progressing  Goal: Maintain proper alignment of affected body part  Description: INTERVENTIONS:  - Support, maintain and protect limb and body alignment  - Provide patient/ family with appropriate education  Outcome: Progressing     Problem: PAIN - ADULT  Goal: Verbalizes/displays adequate comfort level or baseline comfort level  Description: Interventions:  - Encourage patient to monitor pain and request assistance  - Assess pain using appropriate pain scale  - Administer analgesics based on type and severity of pain and evaluate response  - Implement non-pharmacological measures as appropriate and evaluate response  - Consider cultural and social influences on pain and pain management  - Notify physician/advanced practitioner if interventions unsuccessful or patient reports new pain  Outcome: Progressing     Problem: INFECTION - ADULT  Goal: Absence or prevention of progression during hospitalization  Description: INTERVENTIONS:  - Assess and monitor for signs and symptoms of infection  - Monitor lab/diagnostic results  - Monitor all insertion sites, i e  indwelling lines, tubes, and drains  - Monitor endotracheal if appropriate and nasal secretions for changes in amount and color  - [place] appropriate cooling/warming therapies per order  - Administer medications as ordered  - Instruct and encourage patient and family to use good hand hygiene technique  - Identify and instruct in appropriate isolation precautions for identified infection/condition  Outcome: Progressing     Problem: SAFETY ADULT  Goal: Patient will remain free of falls  Description: INTERVENTIONS:  - Assess patient frequently for physical needs  -  Identify cognitive and physical deficits and behaviors that affect risk of falls    -  [place] fall precautions as indicated by assessment   - Educate patient/family on patient safety including physical limitations  - Instruct patient to call for assistance with activity based on assessment  - Modify environment to reduce risk of injury  - Consider OT/PT consult to assist with strengthening/mobility  Outcome: Progressing  Goal: Maintain or return to baseline ADL function  Description: INTERVENTIONS:  -  Assess patient's ability to carry out ADLs; assess patient's baseline for ADL function and identify physical deficits which impact ability to perform ADLs (bathing, care of mouth/teeth, toileting, grooming, dressing, etc )  - Assess/evaluate cause of self-care deficits   - Assess range of motion  - Assess patient's mobility; develop plan if impaired  - Assess patient's need for assistive devices and provide as appropriate  - Encourage maximum independence but intervene and supervise when necessary  - Involve family in performance of ADLs  - Assess for home care needs following discharge   - Consider OT consult to assist with ADL evaluation and planning for discharge  - Provide patient education as appropriate  Outcome: Progressing  Goal: Maintain or return mobility status to optimal level  Description: INTERVENTIONS:  - Assess patient's baseline mobility status (ambulation, transfers, stairs, etc )    - Identify cognitive and physical deficits and behaviors that affect mobility  - Identify mobility aids required to assist with transfers and/or ambulation (gait belt, sit-to-stand, lift, walker, cane, etc )  - [place] fall precautions as indicated by assessment  - Record patient progress and toleration of activity level on Mobility SBAR; progress patient to next Phase/Stage  - Instruct patient to call for assistance with activity based on assessment  - Consider rehabilitation consult to assist with strengthening/weightbearing, etc   Outcome: Progressing     Problem: DISCHARGE PLANNING  Goal: Discharge to home or other facility with appropriate resources  Description: INTERVENTIONS:  - Identify barriers to discharge w/patient and caregiver  - Arrange for needed discharge resources and transportation as appropriate  - Identify discharge learning needs (meds, wound care, etc )  - Arrange for interpretive services to assist at discharge as needed  - Refer to Case Management Department for coordinating discharge planning if the patient needs post-hospital services based on physician/advanced practitioner order or complex needs related to functional status, cognitive ability, or social support system  Outcome: Progressing     Problem: Knowledge Deficit  Goal: Patient/family/caregiver demonstrates understanding of disease process, treatment plan, medications, and discharge instructions  Description: Complete learning assessment and assess knowledge base  Interventions:  - Provide teaching at level of understanding  - Provide teaching via preferred learning methods  Outcome: Progressing     Problem: Neurological Deficit  Goal: Neurological status is stable or improving  Description: Interventions:  - Monitor and assess patient's level of consciousness, motor function, sensory function, and level of assistance needed for ADLs  - Monitor and report changes from baseline  Collaborate with interdisciplinary team to initiate plan and implement interventions as ordered  - Provide and maintain a safe environment  - Consider seizure precautions  - Consider fall precautions  - Consider aspiration precautions  - Consider bleeding precautions  Outcome: Progressing     Problem: Activity Intolerance/Impaired Mobility  Goal: Mobility/activity is maintained at optimum level for patient  Description: Interventions:  - Assess and monitor patient  barriers to mobility and need for assistive/adaptive devices  - Assess patient's emotional response to limitations  - Collaborate with interdisciplinary team and initiate plans and interventions as ordered  - Encourage independent activity per ability   - Maintain proper body alignment  - Perform active/passive rom as tolerated/ordered  - Plan activities to conserve energy    - Turn patient as appropriate  Outcome: Progressing     Problem: Communication Impairment  Goal: Ability to express needs and understand communication  Description: Assess patient's communication skills and ability to understand information  Patient will demonstrate use of effective communication techniques, alternative methods of communication and understanding even if not able to speak  - Encourage communication and provide alternate methods of communication as needed  - Collaborate with case management/ for discharge needs  - Include patient/family/caregiver in decisions related to communication  Outcome: Progressing     Problem: Potential for Aspiration  Goal: Non-ventilated patient's risk of aspiration is minimized  Description: Assess and monitor vital signs, respiratory status, and labs (WBC)  Monitor for signs of aspiration (tachypnea, cough, rales, wheezing, cyanosis, fever)  - Assess and monitor patient's ability to swallow  - Place patient up in chair to eat if possible  - HOB up at 90 degrees to eat if unable to get patient up into chair   - Supervise patient during oral intake  - Instruct patient/ family to take small bites  - Instruct patient/ family to take small single sips when taking liquids  - Follow patient-specific strategies generated by speech pathologist   Outcome: Progressing     Problem: Nutrition  Goal: Nutrition/Hydration status is improving  Description: Monitor and assess patient's nutrition/hydration status for malnutrition (ex- brittle hair, bruises, dry skin, pale skin and conjunctiva, muscle wasting, smooth red tongue, and disorientation)  Collaborate with interdisciplinary team and initiate plan and interventions as ordered  Monitor patient's weight and dietary intake as ordered or per policy  Utilize nutrition screening tool and intervene per policy  Determine patient's food preferences and provide high-protein, high-caloric foods as appropriate  - Assist patient with eating   - Allow adequate time for meals   - Encourage patient to take dietary supplement as ordered  - Collaborate with clinical nutritionist   - Include patient/family/caregiver in decisions related to nutrition  Outcome: Progressing     Problem: Prexisting or High Potential for Compromised Skin Integrity  Goal: Skin integrity is maintained or improved  Description: INTERVENTIONS:  - Identify patients at risk for skin breakdown  - Assess and monitor skin integrity  - Assess and monitor nutrition and hydration status  - Monitor labs   - Assess for incontinence   - Turn and reposition patient  - Assist with mobility/ambulation  - Relieve pressure over bony prominences  - Avoid friction and shearing  - Provide appropriate hygiene as needed including keeping skin clean and dry  - Evaluate need for skin moisturizer/barrier cream  - Collaborate with interdisciplinary team   - Patient/family teaching  - Consider wound care consult   Outcome: Progressing     Problem: Potential for Falls  Goal: Patient will remain free of falls  Description: INTERVENTIONS:  - Assess patient frequently for physical needs  -  Identify cognitive and physical deficits and behaviors that affect risk of falls    -  [place] fall precautions as indicated by assessment   - Educate patient/family on patient safety including physical limitations  - Instruct patient to call for assistance with activity based on assessment  - Modify environment to reduce risk of injury  - Consider OT/PT consult to assist with strengthening/mobility  Outcome: Progressing

## 2020-08-20 NOTE — PROGRESS NOTES
Progress Note - Simon Flores  1955, 60 y o  male MRN: [id number]    Unit/Bed#: ICU 02 Encounter: [id number]    Primary Care Provider: Herlinda Rockwell MD   Date and time admitted to hospital: 8/19/2020 11:37 AM        * Cerebellar hemorrhage ([facility] [address] )  Assessment & Plan  Patient p/w 2 day history of dizziness, dysarthria, nausea, vomiting and gait difficulties  Patient with history of hypertension and AFib on Eliquis, patient very noncompliant with medications  Right cerebellum hemorrhage with mild surrounding edema with mass effect on 4th ventricle  Right superior cerebellar artery hemorrhagic infarct  Imaging:    MRI brain 08/20/2020:  Right superior cerebellar artery hemorrhagic infarct  Local mass effect without hydrocephalus  Sluggish flow within the right vertebral is likely  CTA head and neck 08/19/2020:3 1 x 2 0 cm hemorrhage with mild surrounding perifocal edema in the right cerebellum extending to involve the cerebellar vermis may be hypertensive hemorrhage  No large vessel occlusion  Patent vertebrobasilar system   No significant carotid stenosis  No hydrocephalus  Plan:  · Continue frequent neurological checks  · Reviewed imaging with patient  · STAT CT head with any neurological decline including drop GCS of 2pts within 1 hr   · Recommend SBP <160mmHg  · Cardene infusion was stopped and patient was transitioned on PO Lisinopril and metoprolol  · No Keppra recommended given area of bleed  · Hold all antiplatelet and anticoagulation medications  Continue to hold home Eliquis  · Medical management and pain control per primary team  · No neurosurgical intervention indicated at this juncture  · Neurology following, input appreciated  · PT/OT eval  · DVT PPX: SCDs only at this time  Continue to hold pharmacological DVT prophylaxis secondary to cerebellar hemorrhage      Neurosurgery will sign off, no follow-up required, patient can follow up prn, recommend outpatient follow-up with Neurology, call with any questions or concerns  Subjective/Objective      Chief Complaint: "I feel good today    Subjective:  Spoke with Benjamin Richmond, patient's Cardene infusion was stopped at midnight, patient received 1 dose of hydralazine and was transitioned to oral lisinopril and metoprolol  Patient complaining of a dull aching forehead headache 4/10  He reports he has not taken anything for pain  Patient feels like his speech has improved  He denies any dizziness, blurry vision, chest pain, shortness of breath, abdominal pain, nausea, vomiting, diarrhea, no problems with bowel or bladder, no new weakness or numbness/tingling  Patient reports he had a BM this morning  Objective:  Patient comfortably lying in bed, NAD  I/O       08/18 0701 - 08/19 0700 08/19 0701 - 08/20 0700 08/20 0701 - 08/21 0700    P  O   480 300    I V  (mL/kg)  440 3 (6 1)     IV Piggyback  1000     Total Intake(mL/kg)  1920 3 (26 8) 300 (4 2)    Urine (mL/kg/hr)  1450     Total Output  1450     Net  +470 3 +300           Unmeasured Urine Occurrence   1 x          Invasive Devices     Peripheral Intravenous Line            Peripheral IV 08/19/20 Left Antecubital less than 1 day    Peripheral IV 08/20/20 Right;Ventral (anterior) Forearm less than 1 day                Physical Exam:  Vitals: Blood pressure (!) 161/105, pulse 92, temperature 98 3 °F (36 8 °C), temperature source Oral, resp  rate (!) 23, height 5' 8" (1 727 m), weight 71 6 kg (157 lb 13 6 oz), SpO2 97 %  ,Body mass index is 24 kg/m²      General appearance: alert, appears stated age, cooperative and no distress  Head: Normocephalic, without obvious abnormality, atraumatic  Eyes: B/L nystagmus, EOMI, PERRL, conjugate gaze  Neck: supple, symmetrical, trachea midline and NT  Lungs: non labored breathing  Heart: regular heart rate  Neurologic:   Mental status: Alert, oriented x3, thought content appropriate, mild dysarthria, following commands  Cranial nerves: grossly intact (Cranial nerves II-XII)  Sensory: normal to LT in all extremities x4, JPS and DST intact  Motor: moving all extremities without focal weakness, strength 5/5 throughout  Reflexes: 1+ and symmetric, no Mederos's or clonus appreciated  Coordination:  Right-sided mild dysmetria with finger to nose, no drift bilaterally      Lab Results:  Results from last 7 days   Lab Units 08/20/20  0449 08/19/20  1158   WBC Thousand/uL 8 37 8 83   HEMOGLOBIN g/dL 17 5* 18 0*   HEMATOCRIT % 48 5 52 0*   PLATELETS Thousands/uL 153 162   NEUTROS PCT % 69 75   MONOS PCT % 8 8     Results from last 7 days   Lab Units 08/20/20  0448 08/19/20  1158   POTASSIUM mmol/L 3 3* 3 8   CHLORIDE mmol/L 100 99*   CO2 mmol/L 30 32   BUN mg/dL 13 16   CREATININE mg/dL 0 74 1 07   CALCIUM mg/dL 8 6 8 9   ALK PHOS U/L  --  52   ALT U/L  --  46   AST U/L  --  38     Results from last 7 days   Lab Units 08/20/20  0448   MAGNESIUM mg/dL 2 4         Results from last 7 days   Lab Units 08/19/20  1203   INR  1 00   PTT seconds 28     No results found for: TROPONINT  ABG:No results found for: PHART, OIB2ILX, PO2ART, YMD6HKE, R5CBTMHH, BEART, SOURCE    Imaging Studies: I have personally reviewed pertinent reports  and I have personally reviewed pertinent films in PACS    Cta Head And Neck With And Without Contrast    Result Date: 8/19/2020  Impression: 3 1 x 2 0 cm hemorrhage with mild surrounding perifocal edema in the right cerebellum extending to involve the cerebellar vermis may be hypertensive hemorrhage  However consider MRI to exclude any underlying lesion/neoplasm No large vessel occlusion Patent vertebrobasilar system No significant carotid stenosis No hydrocephalus  I personally discussed this study with SHERLY Zamora on 8/19/2020 at 2:48 PM   Workstation performed: [id number]     Xr Chest Portable    Result Date: 8/19/2020  Impression: No acute cardiopulmonary disease   Workstation performed: [id number]       EKG, Pathology, and Other Studies: I have personally reviewed pertinent reports        VTE Pharmacologic Prophylaxis: Reason for no pharmacologic prophylaxis Secondary to cerebellar hemorrhage    VTE Mechanical Prophylaxis: sequential compression device

## 2020-08-20 NOTE — OCCUPATIONAL THERAPY NOTE
Occupational Therapy Evaluation     Patient Name: Doug Prather  Today's Date: 8/20/2020  Problem List  Principal Problem:    Cerebellar hemorrhage ([facility] [address] )  Active Problems:    Dizziness    A-fib (HCC)    HTN (hypertension)    Past Medical History  Past Medical History:   Diagnosis Date    A-fib ([facility] [address] )     Disease of thyroid gland     Hypertension      Past Surgical History  Past Surgical History:   Procedure Laterality Date    ROTATOR CUFF REPAIR               08/20/20 1105   Note Type   Note type Eval/Treat   Restrictions/Precautions   Weight Bearing Precautions Per Order No   Other Precautions Cognitive;Multiple lines;Telemetry; Fall Risk;Pain   Pain Assessment   Pain Assessment Tool Pain Assessment not indicated - pt denies pain   Pain Score No Pain   Pain Location/Orientation Orientation: Left; Location: Arm   Pain Onset/Description Descriptor: [facility]   Hospital Pain Intervention(s) Repositioned; Ambulation/increased activity; Emotional support   Home Living   Type of [address] [place] [address] Two level; Other (Comment);1/2 bath on main level;Bed/bath upstairs  (0 MONIQUE)   Bathroom Shower/Tub Tub/shower unit   Bathroom Toilet Standard   Bathroom Equipment Shower chair   Home Equipment Other (Comment)  (denies any)   Additional Comments pt reports living in 2 SH, 0 MONIQUE, 1/2 bath 1st floor, main bed/bath 2nd floor   Prior Function   Level of [place] Independent with ADLs and functional mobility   Lives With Family; Other (Comment)  (cousin)   Receives Help From Family   ADL Assistance Independent   IADLs Independent   Falls in the last 6 months 0   Vocational Full time employment   Comments Pt reports being I w/ ADLS, IADLS, transfers and functional mobility PTA   Lifestyle   Autonomy I ADLS/IADLS, transfers and functional mobility PTA   Reciprocal Relationships Pt lives w/ cousin (reports split from spouse)   Service to Others Pt works full time [id number] Enjoys playing music   Psychosocial   Psychosocial (WDL) WDL   ADL   Eating Assistance 5  Supervision/Setup   Grooming Assistance 5  Supervision/Setup   UB Bathing Assistance 5  Supervision/Setup   LB Bathing Assistance 4  Minimal Assistance   UB Dressing Assistance 5  Supervision/Setup   LB Dressing Assistance 4  Minimal Assistance   Toileting Assistance  4  Minimal Assistance   Functional Assistance 4  Minimal Assistance   Functional Deficit Steadying;Verbal cueing;Supervision/safety; Increased time to complete   Bed Mobility   Supine to Sit 5  Supervision   Additional items HOB elevated; Increased time required;Verbal cues   Sit to Supine Unable to assess   Additional Comments Pt went from supine to sit w/ S, HOB elevated for assist  Pt sat EOB w/ Fair sitting balance/trunk control   Transfers   Sit to Stand 4  Minimal assistance   Additional items Assist x 1; Increased time required;Verbal cues; Impulsive   Stand to Sit 4  Minimal assistance   Additional items Assist x 1; Increased time required;Verbal cues   Additional Comments Pt performed STS transfer from EOB w/ Min A x1 for mild force production into standing  HHA used  Functional Mobility   Functional Mobility 4  Minimal assistance   Additional Comments Pt ambulated short household distance w/ Min A x1  HHA used   +VC for safety   Additional items Hand hold assistance   Balance   Static Sitting Fair   Dynamic Sitting Fair -   Static Standing Poor +   Dynamic Standing Poor +   Ambulatory Poor +   Activity Tolerance   Activity Tolerance Patient limited by fatigue   Medical Staff Made Aware PT   Nurse Made Aware yes   RUE Assessment   RUE Assessment WFL   LUE Assessment   LUE Assessment WFL   Hand Function   Gross Motor Coordination Functional  (appears to have mild incoordination of R side)   Fine Motor Coordination Functional   Sensation   Light Touch No apparent deficits   Proprioception   Proprioception No apparent deficits   Vision-Basic Assessment   Current Vision No visual deficits   Vision - Complex Assessment   Ocular Range of Motion [facility]   Perception   Inattention/Neglect Appears intact   Cognition   Overall Cognitive Status WFL   Arousal/Participation Responsive; Cooperative   Attention Attends with cues to redirect   Orientation Level Oriented X4   Memory Within functional limits   Following Commands Follows one step commands without difficulty   Comments Pt is pleasant and cooperative; needs occasional cues for safety   Assessment   Limitation Decreased ADL status; Decreased Safe judgement during ADL;Decreased endurance;Decreased self-care trans;Decreased high-level ADLs   Prognosis Fair   Assessment Pt is a 59 y/o male seen for OT eval s/p adm to [facility] w/ 2 days of N/V, lightheadedness, gait difficulties and dysarthria  Pt is dx'd w/ R cerebellar hemorrhage w/ surround edema  Pt  has a past medical history of A-fib ([address] ), Disease of thyroid gland, and Hypertension  Pt with active OT orders and up with assistance  orders  Pt lives with a cousin in 2 SH, 0 MONIQUE, bed/bath 2nd floor  Pt was I w/ ADLS and IADLS, drove, & required no use of DME PTA  Pt is currently demonstrating the following occupational deficits: S UB ADLS, Min A LB ADLS, Min A transfers and functional mobility w/ HHA, +VC for safety  These deficits that are impacting pt's baseline areas of occupation are a result of the following impairments: endurance, activity tolerance, functional mobility, forward functional reach, balance, functional standing tolerance, decreased I w/ ADLS/IADLS, decreased safety awareness, decreased insight into deficits and coordination deficits  The following Occupational Performance Areas to address include: grooming, bathing/shower, toilet hygiene, dressing, health maintenance, functional mobility, community mobility, clothing management, household maintenance and job performance/volunteering  Pt scored overall 55/100 on the Barthel Index   Based on the aforementioned OT evaluation, functional performance deficits, and assessments, pt has been identified as a moderate complexity evaluation  Recommend home OT upon D/C, when medically stable  Pt to continue to benefit from acute immediate OT services to address the following goals 3-5x/week to  w/in 7-10 days:    Goals   Patient Goals to get back to work   LTG Time Frame 7-10   Long Term Goal #1 see below listed goals   Plan   Treatment Interventions ADL retraining;Functional transfer training;UE strengthening/ROM; Endurance training;Cognitive reorientation;Patient/family training;Fine motor coordination activities; Compensatory technique education;Continued evaluation; Energy conservation; Activityengagement   Goal Expiration Date 20   OT Frequency 3-5x/wk   Recommendation   OT Discharge Recommendation Home with skilled therapy  (Home OT w/ increased family support)   OT - OK to Discharge Yes  (when medically stable)   Barthel Index   Feeding 10   Bathing 0   Grooming Score 5   Dressing Score 5   Bladder Score 10   Bowels Score 10   Toilet Use Score 5   Transfers (Bed/Chair) Score 10   Mobility (Level Surface) Score 0   Stairs Score 0   Barthel Index Score 55     GOALS    1) Pt will improve activity tolerance to G for min 30 min txment sessions for increase engagement in functional tasks  2) Pt will complete UB/LB dressing/self care w/ mod I using adaptive device and DME as needed  3) Pt will complete bathing w/ Mod I w/ use of AE and DME as needed  4) Pt will complete toileting w/ mod I w/ G hygiene/thoroughness using DME as needed  5) Pt will improve functional transfers to Mod I on/off all surfaces using DME as needed w/ G balance/safety   6) Pt will improve functional mobility during ADL/IADL/leisure tasks to Mod I using DME as needed w/ G balance/safety   7) Pt will participate in simulated IADL management task to increase independence to Mod I w/ G safety and endurance  8) Pt will be attentive 100% of the time during ongoing cognitive assessment w/ G participation to assist w/ safe d/c planning/recommendations  9) Pt will demonstrate G carryover of pt/caregiver education and training as appropriate w/o cues w/ good tolerance to increase safety during functional tasks  10) Pt will demonstrate 100% carryover of energy conservation techniques t/o functional I/ADL/leisure tasks w/o cues s/p skilled education to increase endurance during functional tasks  11) Pt will engage in depression screen/leisure interest checklist w/ G participation to monitor s/s depression and ID 3 positive coping strategies to A w/ emotional regulation and management       Arpita Odell MS, OTR/L

## 2020-08-20 NOTE — PLAN OF CARE
Summary  Pt presented with s/s suggestive of mild dysarthria and mild oral/pharyngeal dysphagia  Symptoms or concerns included suspected episode of reduced oral control with aspiration even with thin liquid  Use of small controlled sips eliminated s/s aspiration with thin liquid         Recommendations: Brief Speech and Swallowing Therapy  Recommended Diet: regular diet and thin liquids   Recommended Form of Meds: as best tolerated (OK to try whole with thin liquid 1 at a time)   Aspiration precautions and swallowing strategies: slow rate of feeding and small "controlled" sips (ie use of "prep set")

## 2020-08-20 NOTE — PLAN OF CARE
Problem: OCCUPATIONAL THERAPY ADULT  Goal: Performs self-care activities at highest level of function for planned discharge setting  See evaluation for individualized goals  Description: Treatment Interventions: ADL retraining, Functional transfer training, UE strengthening/ROM, Endurance training, Cognitive reorientation, Patient/family training, Fine motor coordination activities, Compensatory technique education, Continued evaluation, Energy conservation, Activityengagement          See flowsheet documentation for full assessment, interventions and recommendations  Note: Limitation: Decreased ADL status, Decreased Safe judgement during ADL, Decreased endurance, Decreased self-care trans, Decreased high-level ADLs  Prognosis: Fair  Assessment: Pt is a 61 y/o male seen for OT eval s/p adm to [facility] w/ 2 days of N/V, lightheadedness, gait difficulties and dysarthria  Pt is dx'd w/ R cerebellar hemorrhage w/ surround edema  Pt  has a past medical history of A-fib ([facility] [address] ), Disease of thyroid gland, and Hypertension  Pt with active OT orders and up with assistance  orders  Pt lives with a cousin in 2 , 0 MONIQUE, bed/bath 2nd floor  Pt was I w/ ADLS and IADLS, drove, & required no use of DME PTA  Pt is currently demonstrating the following occupational deficits: S UB ADLS, Min A LB ADLS, Min A transfers and functional mobility w/ HHA, +VC for safety  These deficits that are impacting pt's baseline areas of occupation are a result of the following impairments: endurance, activity tolerance, functional mobility, forward functional reach, balance, functional standing tolerance, decreased I w/ ADLS/IADLS, decreased safety awareness, decreased insight into deficits and coordination deficits  The following Occupational Performance Areas to address include: grooming, bathing/shower, toilet hygiene, dressing, health maintenance, functional mobility, community mobility, clothing management, household maintenance and job performance/volunteering  Pt scored overall 55/100 on the Barthel Index  Based on the aforementioned OT evaluation, functional performance deficits, and assessments, pt has been identified as a moderate complexity evaluation  Recommend home OT upon D/C, when medically stable   Pt to continue to benefit from acute immediate OT services to address the following goals 3-5x/week to  w/in 7-10 days:      OT Discharge Recommendation: Home with skilled therapy(Home OT w/ increased family support)  OT - OK to Discharge: Yes(when medically stable)     Ana Webb MS, OTR/L

## 2020-08-20 NOTE — PHYSICAL THERAPY NOTE
Physical Therapy Evaluation     Patient's Name: Modesto Knox  Admitting Diagnosis  Cerebellar hemorrhage (HCC) [I61 4]  Dizziness [R42]  CVA (cerebral vascular accident) ([facility] [address] ) [I63 9]  Noncompliance with medication regimen [Z91 14]    Problem List  Patient Active Problem List   Diagnosis    [facility]    Dizziness    [facility]    HTN (hypertension)       Past Medical History  Past Medical History:   Diagnosis Date    A-fib ([place] [address] )     Disease of thyroid gland     Hypertension        Past Surgical History  Past Surgical History:   Procedure Laterality Date    ROTATOR CUFF REPAIR          08/20/20 1106   Note Type   Note type Eval/Treat   Pain Assessment   Pain Assessment Tool Pain Assessment not indicated - pt denies pain   Pain Score No Pain   Pain Location/Orientation Orientation: Left   Hospital Pain Intervention(s) Repositioned   Home Living   Type of [address] [place] [address] Two level; Other (Comment)   Bathroom Shower/Tub Tub/shower unit   Bathroom Toilet Standard   Prior Function   Level of [place] Independent with ADLs and functional mobility   Lives With Family  (cousin)   Receives Help From Family   ADL Assistance Independent   IADLs Independent   Falls in the last 6 months 0   Vocational Full time employment  (moves [employer])   Restrictions/Precautions   [place] Bearing Precautions Per Order No   Other Precautions Multiple lines; Fall Risk;Pain;Bed Alarm; Chair Alarm; Impulsive;Telemetry   General   Family/Caregiver Present No   Cognition   Orientation Level Oriented X4   RLE Assessment   RLE Assessment WFL   LLE Assessment   LLE Assessment WFL   Coordination   Movements are Fluid and Coordinated 0   Coordination and Movement Description slow and guarded, increased time to process   Bed Mobility   Supine to Sit 5  Supervision   Additional items HOB elevated   Sit to Supine Unable to assess   Additional Comments Pt left resting in chair as requested, call bell in reach Transfers   Sit to Stand 4  Minimal assistance   Additional items Assist x 1; Increased time required   Stand to Sit 4  Minimal assistance   Additional items Assist x 1; Increased time required   Ambulation/Elevation   Gait pattern Excessively slow; Shuffling;Decreased foot clearance   Gait Assistance 4  Minimal assist   Additional items Assist x 1   Assistive Device Other (Comment)  (IV pole)   Distance 50   Balance   Static Sitting Fair   Dynamic Sitting Fair -   Static Standing Poor +   Dynamic Standing Poor +   Ambulatory Poor +   Endurance Deficit   Endurance Deficit Yes   Endurance Deficit Description limited by fatigue   Activity Tolerance   Activity Tolerance Patient limited by fatigue   Medical Staff Made Aware OT   Nurse Made Aware yes, nsg gave clearance to work with pt   Assessment   Prognosis Good   Problem List Decreased strength;Decreased endurance; Impaired balance;Decreased mobility; Decreased coordination;Decreased safety awareness;Pain;Decreased cognition   Assessment Pt is 60 y o  male seen for PT evaluation s/p admit to [facility] on 8/19/2020 w/ Cerebellar hemorrhage ([facility] [address] )  PT consulted to assess pt's functional mobility and d/c needs  Order placed for PT eval and tx  Comorbidities affecting pt's physical performance at time of assessment include:  has a past medical history of A-fib [facility]), Disease of thyroid gland, and Hypertension  PTA, pt was ambulates unrestricted distances and all terrain, lives in multi-level home and works full time  Personal factors affecting pt at time of IE include: ambulating w/ assistive device, decreased cognition, limited home support, decreased initiation and engagement, unable to perform physical activity, inability to perform IADLs and inability to perform ADLs   Please find objective findings from PT assessment regarding body systems outlined above with impairments and limitations including weakness, impaired balance, decreased endurance, gait deviations, pain, decreased activity tolerance, decreased functional mobility tolerance, decreased safety awareness and fall risk  Required increased time to complete bed mobility  Noted increased dizziness in sitting  AMbulated with slow mildly unsteady gait  Noted increased time to process mobility instruction  Pacing instruction given to improve safety  The following objective measures performed on IE also reveal limitations: Barthel Index: 55/100  Pt's clinical presentation is currently unstable/unpredictable seen in pt's presentation of critical care monitoring  Pt to benefit from continued PT tx to address deficits as defined above and maximize level of functional independent mobility and consistency  From PT/mobility standpoint, recommendation at time of d/c would be home with increased family support pending progress to achieve goals in order to facilitate return to PLOF  Barriers to Discharge Decreased caregiver support; Inaccessible home environment   Goals   Patient Goals To go back to work   STG Expiration Date 09/01/20   Short Term Goal #1 1  Complete bed mobility and transfers I to decrease need for caregiver in home  2  Ambulate 300' I to complete household and community mobility without A  3  Improve dynamic balance to good to decrease need for UE support during ambulation  4  Be educated & demonstate 12 steps to be able to enter home without A  Plan   Treatment/Interventions OT; Spoke to case management;Gait training;Bed mobility; Patient/family training; Endurance training;LE strengthening/ROM; Functional transfer training   PT Frequency   (3-6x/wk)   Recommendation   PT Discharge Recommendation Home with skilled therapy  (pending assistance and ability to achieve goals)   PT - OK to Discharge Yes   Barthel Index   Feeding 10   Bathing 0   Grooming Score 5   Dressing Score 5   Bladder Score 10   Bowels Score 10   Toilet Use Score 5   Transfers (Bed/Chair) Score 10   Mobility (Level Surface) Score 0   Stairs Score 0   Barthel Index Score 55           Dewane Dose, PT

## 2020-08-20 NOTE — SOCIAL WORK
CM met with pt at bedside and introduced self/role with dcp  Pt resides with his cousin, Ainsley Rock, in a 2 story home with 1 MONIQUE and bedroom/bathroom on 2nd floor  Half bathroom available on first  Pt reports independent with ADLs and ambulation PTA  Pt drives  Works  No hx of VNA, STR, MH, or drug/alcohol abuse  Pharmacy is [facility]  Primary contact is wife, Sapphire Aranda (although they are )  De Herman [phone] is  for [facility] who is assigned to pt to assist with dcp  CM to follow  CM reviewed d/c planning process including the following: identifying help at home, patient preference for d/c planning needs, Discharge Lounge, [facility] Meds to Bed program, availability of treatment team to discuss questions or concerns patient and/or family may have regarding understanding medications and recognizing signs and symptoms once discharged  CM also encouraged patient to follow up with all recommended appointments after discharge  Patient advised of importance for patient and family to participate in managing patients medical well being

## 2020-08-20 NOTE — PLAN OF CARE
Problem: PHYSICAL THERAPY ADULT  Goal: Performs mobility at highest level of function for planned discharge setting  See evaluation for individualized goals  Description: Treatment/Interventions: OT, Spoke to case management, Gait training, Bed mobility, Patient/family training, Endurance training, LE strengthening/ROM, Functional transfer training          See flowsheet documentation for full assessment, interventions and recommendations  Note: Prognosis: Good  Problem List: Decreased strength, Decreased endurance, Impaired balance, Decreased mobility, Decreased coordination, Decreased safety awareness, Pain, Decreased cognition  Assessment: Pt is 60 y o  male seen for PT evaluation s/p admit to [facility] on 8/19/2020 w/ Cerebellar hemorrhage ([facility] [address] )  PT consulted to assess pt's functional mobility and d/c needs  Order placed for PT eval and tx  Comorbidities affecting pt's physical performance at time of assessment include:  has a past medical history of A-fib [facility]), Disease of thyroid gland, and Hypertension  PTA, pt was ambulates unrestricted distances and all terrain, lives in multi-level home and works full time  Personal factors affecting pt at time of IE include: ambulating w/ assistive device, decreased cognition, limited home support, decreased initiation and engagement, unable to perform physical activity, inability to perform IADLs and inability to perform ADLs  Please find objective findings from PT assessment regarding body systems outlined above with impairments and limitations including weakness, impaired balance, decreased endurance, gait deviations, pain, decreased activity tolerance, decreased functional mobility tolerance, decreased safety awareness and fall risk  Required increased time to complete bed mobility  Noted increased dizziness in sitting  AMbulated with slow mildly unsteady gait  Noted increased time to process mobility instruction   Pacing instruction given to improve safety  The following objective measures performed on IE also reveal limitations: Barthel Index: 55/100  Pt's clinical presentation is currently unstable/unpredictable seen in pt's presentation of critical care monitoring  Pt to benefit from continued PT tx to address deficits as defined above and maximize level of functional independent mobility and consistency  From PT/mobility standpoint, recommendation at time of d/c would be home with increased family support pending progress to achieve goals in order to facilitate return to PLOF  Barriers to Discharge: Decreased caregiver support, Inaccessible home environment     PT Discharge Recommendation: Home with skilled therapy(pending assistance and ability to achieve goals)     PT - OK to Discharge: Yes    See flowsheet documentation for full assessment

## 2020-08-20 NOTE — PROGRESS NOTES
Patient report has been called to Jamey Stanley RN from the [address] [place] [address] 7 Nursing unit  The patient will be transferred to Room 713 as a Level 2 Stepdown with Telemetry status patient

## 2020-08-20 NOTE — PROGRESS NOTES
Progress Note - Venecia Colón  1955, 60 y o  male MRN: [id number]    Unit/Bed#: ICU 02 Encounter: [id number]    Primary Care Provider: Alyson Muñoz MD   Date and time admitted to hospital: 8/19/2020 11:37 AM        HTN (hypertension)  Assessment & Plan  Diagnosis: HTN  BP in -200's/110's  Per patient hasn't been taking any of his prescribed medications for over 1 year, per chart review was previously prescribed metoprolol  Pt put on cardene drip, hydralazine PRN, SBP now ranging 140-160s  Plan:  · SBP goal <160  · Cardene drip now off  · Hydralazine 10mg IV  switched to labetalol 10mg IV PRN for SBP <160  · Started PO Lisinopril 10mg    A-fib ([address] )  Assessment & Plan  Home meds: Eliquis 5mg BID, pt non-compliant  Plan:  · Tele  · Holding eliquis in setting of hemorrhage  · Esmolol IV for HR >110, was never started  · Starting PO Metoprolol 25mg q12    Dizziness  Assessment & Plan  Dizziness prior to admission 2/2 cerebellar hemorrhage  Dizziness currently resolved  Plan:  -See plan for R cerebellar hemorrhage    * Cerebellar hemorrhage (HCC)  Assessment & Plan   Diagnosis: R cerebellar hemorrhage  -CTA: 3x2 cm hemorrhage w/ mild surrounding perifocal edema in R Cerebellum extending to involve cerebellar vermis w/ mild mass effect on 4th ventricle  -MRI Brain: Right superior cerebellar artery hemorrhagic infarct  Local mass effect without hydrocephalus  Sluggish flow within the right vertebral is likely  -Repeat CTH: grossly stable anterior superior R cerebellum parenchymal hematoma w/ surrounding vasogenic edema w/ unchanged regional mass effect & partial effacement of superior 4th ventricle    -Exam: AAOx4, + dysarthria, + RUE dysmetria & ataxia  Plan:  · Repeat CTH 8/20 afternoon  · Neuro checks   · SBP goal <160  · Holding home eliquis  · NsGY, Neurology following  · Tele  · TTE  · Analgesia: tylenol PRN  · Sedation: none      Code Status: Level 1 - Full Code    Reason for ICU admission: Monitoring after R cerebellar hemorrhage, HTN Emergency    Active problems:   Principal Problem:    Cerebellar hemorrhage (HCC)  Active Problems:    Dizziness    A-fib (HCC)    HTN (hypertension)  Resolved Problems:    * No resolved hospital problems  *      Consultants:   Neurosurgery--signed off, recommended OP f/u with neurology  Neurology    History of Present Illness:   Oneil Randhawa  is a 60 y o  male PMHx Afib (non-compliant w/ Eliquis), HTN, hypothyroidism, who presents after 2 days of n/v, lightheadedness, gait difficulties, and dysarthria, found to have R cerebellar hemorrhage w/ surrounding edema w/ some mass effect on the 4th ventricle on CTA   Admitted for ICU care      2 days ago had nausea with non-bloody, non-bilious emesis lasting 1 day  At this time, also noticed lightheadedness with difficulty maintaining balance and difficulty walking  Also noticed his speech changed, noticed more slurring of words and stuttering  In the ED, pt AAOx4, states his walking was a little better this morning than the previous day  Denies any headache, changes in vision, n/v/d, cough, chest pain/SOB      This morning, pt AAOx4, endorses mild frontal headache, denies changes in vision, dizziness, n/v/d, fevers/chills, chest pain/SOB  MRI demonstrating right superior cerebellar artery hemorrhagic infarct w/ local mass effect without hydrocephalus  Summary of clinical course:   Arrived in ED 8/19 after 2 days of n/v, balance & gait difficulties, and change in speech  Found to be hypertensive w/ -200's, in Afib w/ RVR, with R cerebellar hemorrhage on CTA  Exam notable for AAOx4, dysarthria, RUE dysmetria & ataxia  NsGY consulted, no surgical management  Pt started on cardene drip in the ED w/ hydralazine PRN for SBP goal <160, esmolol for HR control (goal <130), home eliquis held  O/N had 4 consecutive beats of VTach, BP was in normal limits at that time, pt asymptomatic  This morning, exam unchanged  Cardene drip off  Esmolol ordered but never given  MRI demonstrating right superior cerebellar artery hemorrhagic infarct w/ local mass effect without hydrocephalus  TTE demonstrating EF 55%, Afib, no clear regional wall motion abnormality, bilateral atrial dilation  Started PO lisinopril 10mg for HTN, PO metoprolol 25mg q12 for HR control  Hydralazine PRN changed to labetalol PRN for SBP >160  Defer to neurology for when to start [facility] for Afib  Repeat CTH scheduled for this afternoon demonstrating grossly stable anterior superior R cerebellum parenchymal hematoma w/ surrounding vasogenic edema w/ unchanged regional mass effect & partial effacement of superior 4th ventricle  BP and HR now stable w/ medical intervention  Transfer to level 2 stepdown w/ tele  Recent or scheduled procedures: none      Outstanding/pending diagnostics: none      Cultures: none         Mobilization Plan:   PT/OT    Nutrition Plan:   House diet w/ cardiac restrictions, thin liquids    Invasive Devices Review  Invasive Devices     Peripheral Intravenous Line            Peripheral IV 08/19/20 Left Antecubital 1 day    Peripheral IV 08/20/20 Right;Ventral (anterior) Forearm less than 1 day              VTE Pharmacologic Prophylaxis: Pharmacologic VTE Prophylaxis contraindicated due to cerebellar hemorrhage  VTE Mechanical Prophylaxis: sequential compression device    Discharge Plan:   Patient should be ready for transfer to Stepdown Level 2 w/ tele on 8/20/2020 after 1pm    Initial Physical Therapy Recommendations: pending eval  Initial Occupational Therapy Recommendations: Continue OT while inpatient, d/c with Home OT  Initial /Plan: pending eval      Spoke with Dr Irina Cross regarding transfer  Please call [phone] with any questions or concerns  Portions of the record may have been created with voice recognition software    Occasional wrong word or "sound a like" substitutions may have occurred due to the inherent limitations of voice recognition software  Read the chart carefully and recognize, using context, where substitutions have occurred

## 2020-08-20 NOTE — PROGRESS NOTES
Progress Note - Neurology   Jabari Olvin  60 y o  male MRN: [id number]  Unit/Bed#: ICU 02 Encounter: [id number]    Assessment/Plan:  60 year male with history of atrial fibrillation, hypertension, thyroid dysfunction, (noncompliant with medications for over the past year), tobacco and alcohol use, presenting with 2 days duration of dizziness, dysarthria, nausea and vomiting as well as gait instability/ambulatory dysfunction      His CTA head/neck on admission revealed a right cerebellar hemorrhagic lesion  MRI brain confirms suspicion for ischemic infarction in the right superior cerebellum with hemorrhagic conversion (high concern for cardioembolic in the setting of his AFib and anticoagulation noncompliance)  1  Dizziness/vomiting, dysarthria, gait instability, right cerebellar hemorrhage:  -CTA/neck with right cerebellar hemorrhage with extension into the cerebellar vermis/mild compression on 4th ventricle  -MRI brain with and without contrast performed overnight, noted concern for ischemic left cerebellar infarct with hemorrhagic conversion  -neurosurgery following  -ICU monitoring for neurologic exam monitoring given posterior circulation edema    -holding anticoagulation/antiplatelet given hemorrhage: anticipate starting antiplatelet with aspirin several days from now and resuming anticoagulation with Eliquis approximately 3 weeks post stroke   -echo with EF 55%, no clear regional wall motion abnormality, bilateral atrial dilation  -supportive care  -telemetry monitoring  -provide stroke education  -therapy evaluations    2  Atrial fibrillation:  -on initial EKG  -has been noncompliant with Eliquis and antihypertensives  -holding AC due to #1, see above for plan     Will further discuss plan of care with attending neurologist     Subjective:   Patient resting in bedside chair, doing okay today  Feels better subjectively    Speech remains minimally dysarthric, right-sided cerebellar function appears improved with coordination testing  No headaches or other new deficits  Vitals: Blood pressure 152/94, pulse 86, temperature 98 5 °F (36 9 °C), temperature source Oral, resp  rate 13, height 5' 8" (1 727 m), weight 71 6 kg (157 lb 13 6 oz), SpO2 97 %  ,Body mass index is 24 kg/m²  Physical Exam:   Physical Exam  Constitutional:       Appearance: Normal appearance  HENT:      Head: Normocephalic and atraumatic  Eyes:      Extraocular Movements: Extraocular movements intact and EOM normal       Conjunctiva/sclera: Conjunctivae normal       Pupils: Pupils are equal, round, and reactive to light  Cardiovascular:      Rate and Rhythm: Normal rate and regular rhythm  Pulses: Normal pulses  Heart sounds: Normal heart sounds  Pulmonary:      Effort: Pulmonary effort is normal       Breath sounds: Normal breath sounds  Musculoskeletal: Normal range of motion  Skin:     General: Skin is warm and dry  Findings: Lesion present  Neurological:      Mental Status: He is alert  Neurologic Exam     Mental Status   Speech remains minimally dysarthric, no aphasia  Following commands well  Cranial Nerves     CN II   Visual fields full to confrontation  CN III, IV, VI   Pupils are equal, round, and reactive to light  Extraocular motions are normal      CN V   Facial sensation intact  CN VII   Facial expression full, symmetric  CN VIII   CN VIII normal      CN IX, X   CN IX normal    CN X normal      CN XI   CN XI normal      CN XII   CN XII normal      Motor Exam   Muscle bulk: normal  Overall muscle tone: normal  Full appearing strength throughout upper and lower extremities  No focal deficit or laterality       Sensory Exam   Light touch normal      Gait, Coordination, and Reflexes     Tremor   Resting tremor: absent  Intention tremor: absent    Reflexes   Right ankle clonus: absent  Left ankle clonus: absent  Still remains minimally ataxic and clumsy with right finger-to-nose, but improved compared to yesterday's exam, heel to shin on the right is also minimally ataxic  Gait not formally assessed  Lab, Imaging and other studies:   MRI brain 08/20/2020: Right superior cerebellar artery hemorrhagic infarct  Local mass effect without hydrocephalus  Sluggish flow within the right vertebral is likely  CBC:   Results from last 7 days   Lab Units 08/20/20  0449 08/19/20  1158   WBC Thousand/uL 8 37 8 83   RBC Million/uL 4 79 5 08   HEMOGLOBIN g/dL 17 5* 18 0*   HEMATOCRIT % 48 5 52 0*   MCV fL 101* 102*   PLATELETS Thousands/uL 153 162   , BMP/CMP:   Results from last 7 days   Lab Units 08/20/20  0448 08/19/20  1158   SODIUM mmol/L 137 137   POTASSIUM mmol/L 3 3* 3 8   CHLORIDE mmol/L 100 99*   CO2 mmol/L 30 32   BUN mg/dL 13 16   CREATININE mg/dL 0 74 1 07   CALCIUM mg/dL 8 6 8 9   AST U/L  --  38   ALT U/L  --  46   ALK PHOS U/L  --  52   EGFR ml/min/1 73sq m 99 74   , Vitamin B12:   , HgBA1C:   , TSH:   Results from last 7 days   Lab Units 08/19/20  1158   TSH 3RD GENERATON uIU/mL 10 600*   , Coagulation:   Results from last 7 days   Lab Units 08/19/20  1203   INR  1 00   , Lipid Profile:   , Ammonia:   , Urinalysis:       Invalid input(s): URIBILINOGEN, Drug Screen:   , Medication Drug Levels:       Invalid input(s): CARBAMAZEPINE,  PHENOBARB, LACOSAMIDE, OXCARBAZEPINE  VTE Prophylaxis: Sequential compression device (Venodyne)  and Reason for no pharmacologic prophylaxis ICH    Total time spent today 25 minutes  Discussed plan of care with patient and critical care:  Reviewed MRI brain, confirming concern for ischemic infarction with hemorrhagic conversion, discussed timeline for resuming antiplatelet and eventual oral anticoagulation once hemorrhage reabsorbs, continue neuro exam monitoring, blood pressure control, therapies, neuro checks

## 2020-08-20 NOTE — ASSESSMENT & PLAN NOTE
Dizziness prior to admission 2/2 cerebellar hemorrhage  Dizziness currently resolved    Plan:  -See plan for R cerebellar hemorrhage

## 2020-08-20 NOTE — ASSESSMENT & PLAN NOTE
Diagnosis: HTN  BP in -200's/110's  Per patient hasn't been taking any of his prescribed medications for over 1 year, per chart review was previously prescribed metoprolol  Pt put on cardene drip, hydralazine PRN, SBP now ranging 140-160s  Plan:  · SBP goal <160  · Cardene drip now off  · Hydralazine 10mg IV  switched to labetalol 10mg IV PRN for SBP <160  · Started PO Lisinopril 10mg

## 2020-08-20 NOTE — SPEECH THERAPY NOTE
SLP Initial Evaluation      Patient Name: Michael Vanegas  Today's Date: 8/20/2020     Problem List  Principal Problem:    Cerebellar hemorrhage ([facility] [address] )  Active Problems:    Dizziness      Past Medical History  Past Medical History:   Diagnosis Date    A-fib ([facility] [address] )     Disease of thyroid gland     Hypertension        Past Surgical History  Past Surgical History:   Procedure Laterality Date    ROTATOR CUFF REPAIR         Summary  Pt presented with s/s suggestive of mild dysarthria and mild oral/pharyngeal dysphagia  Symptoms or concerns included suspected episode of reduced oral control with aspiration even with thin liquid  Use of small controlled sips eliminated s/s aspiration with thin liquid  Recommendations: Brief Speech and Swallowing Therapy  Recommended Diet: regular diet and thin liquids   Recommended Form of Meds: as best tolerated (OK to try whole with thin liquid 1 at a time)   Aspiration precautions and swallowing strategies: slow rate of feeding and small "controlled" sips (ie use of "prep set")        Current Medical Status  Nicolina Lombard is a 64 year male with history of atrial fibrillation, hypertension, thyroid dysfunction, (noncompliant with medications for over the past year), tobacco and alcohol use, presenting with 2 days duration of dizziness, dysarthria, nausea and vomiting as well as gait instability/ambulatory dysfunction    DX:    1  R cerebellar hemorrhage with extension into the cerebellar vermis/mild compression on 4th ventricle  -MRI brain with and without with BRAVO sequence performed overnight:   -eventual pan CT of the chest abdomen and pelvis to screen for any evidence malignancy  -neurosurgery following  -ICU monitoring for neurologic exam monitoring given posterior circulation edema   -holding anticoagulation/antiplatelet given hemorrhage  -defer specific SBP parameters to Neurosurgery              -Cardene infusion started  -therapy evaluations  2  Atrial fibrillation:  -on initial EKG  -has been noncompliant with Eliquis and antihypertensives  -holding AC due to #1     PT, OT & SLP consulted  SLP Evaluation completed bedside  Current Precautions:  Fall, r/o need for Sz precautions    Allergies:  No known food allergies    Past medical history:  Please see H&P for details    Social/Education/Vocational Hx:  Pt lives in [facility] and [facility] (he provided [address] and [phone], [place])    Swallow Information   Current Risks for Dysphagia & Aspiration: CVA/cerebellar hemorrhage  Current Diet: NPO   Baseline Diet: regular diet and thin liquids      Baseline Assessment   Behavior/Cognition: alert and well oriented  Speech/Language Status: Informal evaluation suggested no s/s receptive or expressive language deficits and only mild dysarthria (mild articulatory imprecision at rapid rate)  Patient Positioning: upright in bed  Pain Status/Interventions/Response to Interventions:  No report of or nonverbal indications of pain  Swallow Mechanism Exam  Facial: symmetrical  Labial: WFL  Lingual: WFL  Mandible: adequate ROM  Dentition: adequate  Vocal quality:clear/adequate   Volitional Cough: strong/productive   Respiratory Status: on RA with baseline sat of 97%      Consistencies Assessed and Performance   Consistencies/Materials administered included scrambled eggs, [place], juice    Oral Stage: minimal impairment suspected  Mastication was adequate with the materials administered today  Bolus formation and transfer were functional with no significant oral residue noted  Suspect reduced oral control with initial sip of thin liquid  Pharyngeal Stage: minimal impairment suspected  Swallow Mechanics:  Swallowing initiation appeared prompt  Laryngeal rise was palpated and judged to be within functional limits    No coughing, throat clearing, change in vocal quality or respiratory status noted today with food or thin liquid with use of "prep set" strategy  +Cough with thin liquid when strategy not utilized    Esophageal Concerns: none reported/all sxs denied    Strategies and Efficacy: use of small controlled sips ("prep set") eliminated s/s aspiration today    Summary and Recommendations (see above)    Results Reviewed with: patient and RN     Treatment Recommended: yes    Frequency of treatment: min of 2x weekly    Goals:  -Patient will demonstrate safe and effective oral intake (without overt s/s significant oral/pharyngeal dysphagia including s/s penetration or aspiration) for the highest appropriate diet level      -Patient will utilize trained compensatory strategy/ies (eg   controlled bite/sip size, controlled rate, prep set) with 100% accuracy to eliminate overt s/s penetration/aspiration with the least restrictive food/liquid consistencies    -Pt will use controlled rate in formal and informal tasks at sentence and conversational level with min to no cues needed        Speech Therapy Prognosis   Prognosis: good    Prognosis Considerations: age, medical status and cognitive status

## 2020-08-20 NOTE — PROGRESS NOTES
Patient transfer from ICU  Patient is comfortable, with no outward signs of distress  Will continue to monitor

## 2020-08-20 NOTE — UTILIZATION REVIEW
Initial Clinical Review    Admission: Date/Time/Statement:   Admission Orders (From admission, onward)     Ordered        08/19/20 1514  Inpatient Admission  Once                   Orders Placed This Encounter   Procedures    Inpatient Admission     Standing Status:   Standing     Number of Occurrences:   1     Order Specific Question:   Admitting Physician     Answer:   Elodia Hernandez     Order Specific Question:   Level of Care     Answer:   Critical Care [15]     Order Specific Question:   Estimated length of stay     Answer:   More than 2 Midnights     Order Specific Question:   Certification     Answer:   I certify that inpatient services are medically necessary for this patient for a duration of greater than two midnights  See H&P and MD Progress Notes for additional information about the patient's course of treatment  ED Arrival Information     Expected Arrival Acuity Means of Arrival Escorted By Service Admission Type    - 8/19/2020 11:37 [facility] ([address]) Critical Care/ICU Emergency    Arrival Complaint    dizziness        Chief Complaint   Patient presents with    High Blood Pressure     pt reports nausea, vomiting, dizziness (lightheaded), could not walk right, "everytime I get up, I was holding onto stuff  I thought I was convulsing earlier  I felt likethis when I was diagnosed with a-fib"     Assessment/Plan: 60year old male, presented to the ED @ [facility] from home via EMS  Admitted as Inpatient due to R cerebellar hemorrhage w/ surrounding edema & mild mass effect on the 4th ventricle  C/o 2 days or N/V, lightheadedness, gait dysfunction and dysarthria  CTA: 3x2 cm hemorrhage w/ mild surrounding perifocal edema in R Cerebellum extending to involve cerebellar vermis w/ mild mass effect on 4th ventricle  MRI brain:  Pending  SBP goal <160 IV cardene gtt started  Hold home eliquis in setting of hemorrhage  Consult NeuroSurgery    Analgesia PRN   Monitor on Telemetry  Monitor CBC  DVT PPx: SCDs, no ACs in setting of hemorrhage    08/19/2020 Consult Neurology:  Dizziness/vomiting, dysarthria, gait instability, right cerebellar hemorrhage:  CTA/neck with right cerebellar hemorrhage with extension into the cerebellar vermis/mild compression on 4th ventricle  would pursue MRI brain with and without contrast to further evaluate hemorrhage as well as any other underlying pathology or postcontrast enhancement  patient would also benefit from CT of the chest abdomen and pelvis at some point during admission to rule out primary malignancy (given his extensive smoking and alcohol abuse)  neurosurgery consult pending  ICU monitoring for neurologic exam monitoring given posterior circulation edema  hold anticoagulation/antiplatelet given hemorrhage  defer specific SBP parameters to Neurosurgery  Cardene infusion started  therapy evaluations  08/19/2020  Consult Neurosurgery:  Cerebellar hemorrhage:  Continue frequent neuro checks  No keppra given area of bleed  No neurosurgical intervention indicated at this juncture  Medical Management        ED Triage Vitals   Temperature Pulse Respirations Blood Pressure SpO2   08/19/20 1201 08/19/20 1141 08/19/20 1141 08/19/20 1141 08/19/20 1141   98 3 °F (36 8 °C) 105 20 (!) 189/112 99 %      Temp Source Heart Rate Source Patient Position - Orthostatic VS BP Location FiO2 (%)   08/19/20 1201 08/19/20 1141 08/19/20 1345 08/19/20 1645 --   Oral Monitor Lying Right arm       Pain Score       08/19/20 1245       5          Wt Readings from Last 1 Encounters:   08/20/20 71 6 kg (157 lb 13 6 oz)     Additional Vital Signs:   Date/Time   Temp   Pulse   Resp   BP   MAP (mmHg)   SpO2   O2 Device   Patient Position - Orthostatic VS    08/20/20 1040      92   23Abnormal     161/105Abnormal     118   97 %   None (Room air)       08/20/20 1035      90   16   163/103Abnormal     122   96 %   None (Room air)    08/20/20 0900      92   14   153/90   109   97 %   None (Room air)       08/20/20 0820   98 3 °F (36 8 °C)   90   15   154/99   119   97 %   None (Room air)       08/20/20 0740      90   12   142/95   111   97 %   None (Room air)       08/20/20 0600      86   13   152/94   114   97 %          08/20/20 0500      92   13   143/85   108   96 %          08/20/20 0400   98 5 °F (36 9 °C)   116Abnormal     20   157/104Abnormal     124   98 %   None (Room air)   Lying    08/20/20 0314            161/85                08/20/20 0300      76   13   161/85   125   97 %          08/20/20 0200      90   16   136/85   101   97 %          08/20/20 0030      88   14   135/84   94   94 %          08/20/20 0000   98 2 °F (36 8 °C)   88   12   141/86   105   95 %   None (Room air)   Lying    08/19/20 2330            141/80   103             08/19/20 2300      108Abnormal     18   149/86   121   93 %          08/19/20 2248      94   14   140/82   104   95 %          08/19/20 2148            160/90   113             08/19/20 2115            120/81   93             08/19/20 2053            127/79   95             08/19/20 2000   98 7 °F (37 1 °C)   92   19   135/86   103   91 %   None (Room air)   Lying    08/19/20 1945            115/89   104             08/19/20 1800      82   15   129/88   102   93 %          08/19/20 1730      90   27Abnormal     136/92   120   96 %          08/19/20 1705      92   23Abnormal           97 %          08/19/20 1700      118Abnormal     21   153/95   118   95 %          08/19/20 1645   98 1 °F (36 7 °C)   121Abnormal     17   132/98   110   98 %      Lying    08/19/20 1545      104   19   156/98   120             08/19/20 1500      118Abnormal     19   190/136Abnormal     158   98 %   None (Room air)       08/19/20 1445      100   18   190/136Abnormal        98 %      Lying    08/19/20 1430      90   18 198/112Abnormal     149   97 %          20 1345      98   18   186/104Abnormal        95 %      Lying    20 1315      88   20   185/112Abnormal     138   95 %   None (Room air)       20 1245      90   12   198/118Abnormal     153   99 %          20 1215      94   23Abnormal     200/121Abnormal     156   98 %   None (Room air)         Date and Time  Eye Opening  Best Verbal Response  Best Motor Response  Byron Coma Scale Score    20 0800  4  5  6  15    20 0600  4  5  6  15    20 0400  4  5  6  15    20 0200  4  5  6  15    20 0000  4  5  6  15    20 2200  4  5  6  15    20 2000  4  5  6  15    20 1645  4  5  6  15    20 1545  4  5  6  15    20 1445  4  5  6  15    20 1345  4  5  6  15    20 1245  4  5  6  15    20 1146  4  5  6  15      2020 @ 1034  MRI brain:  Right superior cerebellar artery hemorrhagic infarct   Local mass effect without hydrocephalus   Sluggish flow within the right vertebral is likely  2020 @ 1424  CTa head/neck:  3 1 x 2 0 cm hemorrhage with mild surrounding perifocal edema in the right cerebellum extending to involve the cerebellar vermis may be hypertensive hemorrhage   However consider MRI to exclude any underlying lesion/neoplasm   No large vessel occlusion   Patent vertebrobasilar system   No significant carotid stenosis   No hydrocephalus     2020 @ 1404  Chest X: No acute cardiopulmonary disease  2020 @ 1143  EC, S  Fib w RVR     Pertinent Labs/Diagnostic Test Results:     Results from last 7 days   Lab Units 20  0449 20  1158   WBC Thousand/uL 8 37 8 83   HEMOGLOBIN g/dL 17 5* 18 0*   HEMATOCRIT % 48 5 52 0*   PLATELETS Thousands/uL 153 162   NEUTROS ABS Thousands/µL 5 77 6 54     Results from last 7 days   Lab Units 20  0448 20  1158   SODIUM mmol/L 137 137   POTASSIUM mmol/L 3 3* 3 8   CHLORIDE mmol/L 100 99* CO2 mmol/L 30 32   ANION GAP mmol/L 7 6   BUN mg/dL 13 16   CREATININE mg/dL 0 74 1 07   EGFR ml/min/1 73sq m 99 74   CALCIUM mg/dL 8 6 8 9   MAGNESIUM mg/dL 2 4  --      Results from last 7 days   Lab Units 08/19/20  1158   AST U/L 38   ALT U/L 46   ALK PHOS U/L 52   TOTAL PROTEIN g/dL 7 9   ALBUMIN g/dL 3 8   TOTAL BILIRUBIN mg/dL 1 12*   BILIRUBIN DIRECT mg/dL 0 32*     Results from last 7 days   Lab Units 08/20/20  0448 08/19/20  1158   GLUCOSE RANDOM mg/dL 80 143*     Results from last 7 days   Lab Units 08/19/20  1446 08/19/20  1158   TROPONIN I ng/mL <0 02 <0 02     Results from last 7 days   Lab Units 08/19/20  1203   PROTIME seconds 13 2   INR  1 00   PTT seconds 28     Results from last 7 days   Lab Units 08/19/20  1158   TSH 3RD GENERATON uIU/mL 10 600*     ED Treatment:   Medication Administration from 08/19/2020 1136 to 08/19/2020 1626       Date/Time Order Dose Route Action     08/19/2020 1158 sodium chloride 0 9 % bolus 1,000 mL 1,000 mL Intravenous New Bag     08/19/2020 1204 ondansetron (ZOFRAN) injection 4 mg 4 mg Intravenous Given     08/19/2020 1356 iohexol (OMNIPAQUE) 350 MG/ML injection (MULTI-DOSE) 85 mL 85 mL Intravenous Given     08/19/2020 1608 niCARdipine (CARDENE) 25 mg (STANDARD CONCENTRATION) in sodium chloride 0 9% 250 mL 7 5 mg/hr Intravenous Rate/Dose Change     08/19/2020 1517 niCARdipine (CARDENE) 25 mg (STANDARD CONCENTRATION) in sodium chloride 0 9% 250 mL 5 mg/hr Intravenous New Bag        Past Medical History:   Diagnosis Date    A-fib ([facility] )     Disease of thyroid gland     Hypertension      Present on Admission:   Cerebellar hemorrhage ([facility] )   Dizziness    Admitting Diagnosis: Cerebellar hemorrhage (HCC) [I61 4]  Dizziness [R42]  CVA (cerebral vascular accident) ([facility] ) [I63 9]  Noncompliance with medication regimen [Z91 14]  Age/Sex: 60 y o  male  Admission Orders:  Scheduled Medications:  lisinopril, 10 mg, Oral, Daily  metoprolol tartrate, 25 mg, Oral, Q12H [address]  potassium chloride, 20 mEq, Intravenous, Q2H    Continuous IV Infusions:  esmolol,  mcg/kg/min, Intravenous, Titrated  niCARdipine, 1-15 mg/hr, Intravenous, Titrated    PRN Meds:  acetaminophen, 650 mg, Oral, Q6H PRN  Labetalol HCl, 10 mg, Intravenous, Q6H PRN  ondansetron, 4 mg, Intravenous, Q6H PRN    Neuro checks q2h  Fall precautions  dysphagia assessment > regular diet  Consult PT/OT/ST  IP CONSULT TO NEUROSURGERY  IP CONSULT TO NEUROLOGY  IP CONSULT TO CASE MANAGEMENT    Network Utilization Review Department  [email or web address]  ATTENTION: Please call with any questions or concerns to [phone] and carefully listen to the prompts so that you are directed to the right person  All voicemails are confidential   [facility] all requests for admission clinical reviews, approved or denied determinations and any other requests to dedicated fax number below belonging to the campus where the patient is receiving treatment   List of dedicated fax numbers for the Facilities:  [address] [place] [address] DENIALS (Administrative/Medical Necessity) [phone]   [address] (Maternity/NICU/Pediatrics) [phone]   Devi [phone]   [facility] [place] [phone]   Tavia Richard [phone]   Aishwarya Worthington [phone]   [address] [facility] [phone]   [facility]  [phone]   [address] [facility] [address]  [address] [facility] [address] [facility] [phone]

## 2020-08-20 NOTE — ASSESSMENT & PLAN NOTE
Diagnosis: R cerebellar hemorrhage  -CTA: 3x2 cm hemorrhage w/ mild surrounding perifocal edema in R Cerebellum extending to involve cerebellar vermis w/ mild mass effect on 4th ventricle  -MRI Brain: Right superior cerebellar artery hemorrhagic infarct  Local mass effect without hydrocephalus  Sluggish flow within the right vertebral is likely  -Repeat CTH: grossly stable anterior superior R cerebellum parenchymal hematoma w/ surrounding vasogenic edema w/ unchanged regional mass effect & partial effacement of superior 4th ventricle    -Exam: AAOx4, + dysarthria, + RUE dysmetria & ataxia  Plan:  · Repeat CTH 8/20 afternoon  · Neuro checks   · SBP goal <160  · Holding home eliquis  · NsGY, Neurology following  · Tele  · TTE  · Analgesia: tylenol PRN  · Sedation: none

## 2020-08-20 NOTE — PROGRESS NOTES
Progress Note - Critical Care   Adia Patton  60 y o  male MRN: [id number]  Unit/Bed#: ICU 02 Encounter: [id number]    Assessment: 65yo M PMHx Afib (non-compliant on Eliquis), HTN, hypothyroidism, who presented after 2 days of n/v, gait instability, & change in speech, found to be in Afib w/ RVR, hypertensive in the 806-166'X systolic, with a R cerebellar hemorrhage on CTA  Admitted for ICU level monitoring  Plan:          Neuro:   Diagnosis: R cerebellar hemorrhage  CTA: 3x2 cm hemorrhage w/ mild surrounding perifocal edema in R Cerebellum extending to involve cerebellar vermis w/ mild mass effect on 4th ventricle    MRI Brain: read pending  Exam: AAOx4, + dysarthria, + RUE dysmetria & ataxia  Plan:  · Repeat CTH  · Neuro checks q1  · SBP goal <160  · Holding home eliquis  · NsGY, Neurology following  · Tele  · TTE  · SLP  · Analgesia: tylenol PRN  · Sedation: none                 CV:   Diagnosis: HTN  BP in -200's/110's  Per patient hasn't been taking any of his prescribed medications for over 1 year  Pt put on cardene drip, hydralazine PRN, SBP now ranging 140-160s  Plan:  · SBP goal <160  · Cardene drip, Hydralazine 10mg IV  for SBP <160    Diagnosis: Afib w/ RVR  Home meds: Eliquis 5mg BID, pt non-compliant  Plan:  · Tele  · Holding eliquis in setting of hemorrhage  · Esmolol IV for HR >110                   Lung: no acute issues                 GI:  no acute issues  · IV Zofran 4mg PRN  · Ulcer ppx: none  · Bowel regimen: PRN                 FEN:   · Fluids: none  · Electrolytes: monitor & replete as needed  · Nutrition: house diet                 :  no acute issues                 ID:  no acute issues  WBC 8 83-->8 37                   Heme:  no acute issues  Hgb 18 0--> 17 5 likely 2/2 hypovolemia in setting of n/v for 2 days PTA  · DVT PPx: SCDs, AC contraindicated in setting of hemorrhage                 Endo:   Diagnosis: Hx hypothyroidism  · Holding home levothyroxine Msk/Skin: no acute issues  · PT/OT when appropriate                 Disposition: Continue ICU care    Chief Complaint: n/v, gait instability, dysarthria    HPI/24hr events:   Bonny Maurice  is a 60 y o  male PMHx Afib (non-compliant w/ Eliquis), HTN, hypothyroidism, who presents after 2 days of n/v, lightheadedness, gait difficulties, and dysarthria, found to have R cerebellar hemorrhage w/ surrounding edema w/ some mass effect on the 4th ventricle on CTA   Admitted for ICU care      2 days ago had nausea with non-bloody, non-bilious emesis lasting 1 day  At this time, also noticed lightheadedness with difficulty maintaining balance and difficulty walking  Also noticed his speech changed, noticed more slurring of words and stuttering  O/N had 4 beats of possible VTach, BP was in normal limits at that time, pt asymptomatic  This morning, pt AAOx4, endorses mild frontal headache, denies changes in vision, dizziness, n/v/d, fevers/chills, chest pain/SOB  Physical Exam:   General: AAOx4, resting comfortably in bed  HEENT: normocephalic, atraumatic, neck supple  Eyes: PERRLA, EOMI, VFI  CV: S1,S2, no murmurs/rubs/gallops, RRR during exam  Pulm: CTAB no wheezes/rales/gallops  Abd: soft, nontender, nondistended, +BS  Neuro:  · Mental status: AAOx4, naming, repetition, memory intact  · Language: +dysarthria  · Cns: CN2-12 intact  · Motor: Normal tone, bulk  No drift  Strength 5/5 in all extremities    · Sensory: intact to light touch in all extremities  · Cbl: RUE dysmetria, ataxia  Psych: calm, cooperative, normal mood & affect    Vitals:    20 0314 20 0400 20 0500 20 0600   BP: 161/85 (!) 157/104 143/85 152/94   BP Location:  Right arm     Pulse:  (!) 116 92 86   Resp:  20 13 13   Temp:  98 5 °F (36 9 °C)     TempSrc:  Oral     SpO2:  98% 96% 97%   Weight:       Height:                 Temperature:   Temp (24hrs), Av 4 °F (36 9 °C), Min:98 1 °F (36 7 °C), Max:98 7 °F (37 1 °C)    Current: Temperature: 98 5 °F (36 9 °C)    Weights:   IBW: 68 4 kg    Body mass index is 23 1 kg/m²  Weight (last 2 days)     Date/Time   Weight    08/19/20 1629   68 9 (151 9)    08/19/20 1141   70 3 (155)              Hemodynamic Monitoring:  N/A    Intake and Outputs:  I/O       08/18 0701 - 08/19 0700 08/19 0701 - 08/20 0700    I V  (mL/kg)  540 3 (7 8)    IV Piggyback  1000    Total Intake(mL/kg)  1540 3 (22 4)    Urine (mL/kg/hr)  1450    Total Output  1450    Net  +90 3                Nutrition:        Diet Orders   (From admission, onward)             Start     Ordered    08/19/20 1549  Diet NPO  Diet effective now     Question Answer Comment   Diet Type NPO    RD to adjust diet per protocol? Yes        08/19/20 1548                Labs:   Results from last 7 days   Lab Units 08/20/20  0449 08/19/20  1158   WBC Thousand/uL 8 37 8 83   HEMOGLOBIN g/dL 17 5* 18 0*   HEMATOCRIT % 48 5 52 0*   PLATELETS Thousands/uL 153 162   NEUTROS PCT % 69 75   MONOS PCT % 8 8      Results from last 7 days   Lab Units 08/20/20  0448 08/19/20  1158   SODIUM mmol/L 137 137   POTASSIUM mmol/L 3 3* 3 8   CHLORIDE mmol/L 100 99*   CO2 mmol/L 30 32   BUN mg/dL 13 16   CREATININE mg/dL 0 74 1 07   CALCIUM mg/dL 8 6 8 9   ALK PHOS U/L  --  52   ALT U/L  --  46   AST U/L  --  38              Results from last 7 days   Lab Units 08/19/20  1203   INR  1 00   PTT seconds 28         0   Lab Value Date/Time    TROPONINI <0 02 08/19/2020 1446    TROPONINI <0 02 08/19/2020 1158    TROPONINI <0 04 05/02/2015 0603    TROPONINI <0 04 05/01/2015 2235       Imaging:     MRI brain 8/20/2020: pending read    CTA 8/19/2020:     FINDINGS:  NONCONTRAST BRAIN  PARENCHYMA:  There is a hemorrhagic lesion within the right cerebellar hemisphere extending to the cerebellar vermis, measuring about 3 1 cm x 2 2 cm    There is mild mass effect on the 4th ventricle  There is mild perifocal edema     VENTRICLES AND EXTRA-AXIAL SPACES:  Mild compression of the 4th ventricle  No hydrocephalus     IMPRESSION:     3 1 x 2 0 cm hemorrhage with mild surrounding perifocal edema in the right cerebellum extending to involve the cerebellar vermis may be hypertensive hemorrhage  However consider MRI to exclude any underlying lesion/neoplasm  No large vessel occlusion  Patent vertebrobasilar system   No significant carotid stenosis  No hydrocephalus      EKG: Afib w/ RVR on admission, on tele      Allergies: No Known Allergies    Medications:   Scheduled Meds:  Current Facility-Administered Medications   Medication Dose Route Frequency Provider Last Rate    esmolol   mcg/kg/min Intravenous Titrated Lugene Yessenia, DO Stopped (08/19/20 1729)    hydrALAZINE  10 mg Intravenous Q6H PRN Lugene Yessenia, DO      niCARdipine  1-15 mg/hr Intravenous Titrated Lugene Yessenia, DO 2 5 mg/hr (08/19/20 2332)    ondansetron  4 mg Intravenous Q6H PRN Lugene Yessenia, DO       Continuous Infusions:esmolol,  mcg/kg/min, Last Rate: Stopped (08/19/20 1729)  niCARdipine, 1-15 mg/hr, Last Rate: 2 5 mg/hr (08/19/20 2332)      PRN Meds:  hydrALAZINE, 10 mg, Q6H PRN  ondansetron, 4 mg, Q6H PRN        VTE Pharmacologic Prophylaxis: Reason for no pharmacologic prophylaxis cerebellar hemorrhage  VTE Mechanical Prophylaxis: sequential compression device    Invasive lines and devices: Invasive Devices     Peripheral Intravenous Line            Peripheral IV 08/19/20 Left Antecubital less than 1 day    Peripheral IV 08/20/20 Right;Ventral (anterior) Forearm less than 1 day                   Counseling / Coordination of Care  Total Critical Care time spent 60 minutes excluding procedures, teaching and family updates  Code Status: Level 1 - Full Code     Portions of the record may have been created with voice recognition software  Occasional wrong word or "sound a like" substitutions may have occurred due to the inherent limitations of voice recognition software    Read the chart carefully and recognize, using context, where substitutions have occurred       Lucia Renner, MS4

## 2020-08-20 NOTE — ASSESSMENT & PLAN NOTE
Home meds: Eliquis 5mg BID, pt non-compliant  Plan:  · Tele  · Holding eliquis in setting of hemorrhage  · Esmolol IV for HR >110, was never started  · Starting PO Metoprolol 25mg q12

## 2020-08-20 NOTE — ASSESSMENT & PLAN NOTE
Patient p/w 2 day history of dizziness, dysarthria, nausea, vomiting and gait difficulties  Patient with history of hypertension and AFib on Eliquis, patient very noncompliant with medications  Right cerebellum hemorrhage with mild surrounding edema with mass effect on 4th ventricle  Right superior cerebellar artery hemorrhagic infarct  Imaging:    MRI brain 08/20/2020:  Right superior cerebellar artery hemorrhagic infarct  Local mass effect without hydrocephalus  Sluggish flow within the right vertebral is likely  CTA head and neck 08/19/2020:3 1 x 2 0 cm hemorrhage with mild surrounding perifocal edema in the right cerebellum extending to involve the cerebellar vermis may be hypertensive hemorrhage  No large vessel occlusion  Patent vertebrobasilar system   No significant carotid stenosis  No hydrocephalus  Plan:  · Continue frequent neurological checks  · Reviewed imaging with patient  · STAT CT head with any neurological decline including drop GCS of 2pts within 1 hr   · Recommend SBP <160mmHg  · Cardene infusion was stopped and patient was transitioned on PO Lisinopril and metoprolol  · No Keppra recommended given area of bleed  · Hold all antiplatelet and anticoagulation medications  Continue to hold home Eliquis  · Medical management and pain control per primary team  · No neurosurgical intervention indicated at this juncture  · Neurology following, input appreciated  · PT/OT eval  · DVT PPX: SCDs only at this time  Continue to hold pharmacological DVT prophylaxis secondary to cerebellar hemorrhage  Neurosurgery will sign off, no follow-up required, patient can follow up prn, recommend outpatient follow-up with Neurology, call with any questions or concerns

## 2020-08-21 ENCOUNTER — TELEPHONE (OUTPATIENT)
Dept: NEUROLOGY | Facility: CLINIC | Age: 65
End: 2020-08-21

## 2020-08-21 LAB
ANION GAP SERPL CALCULATED.3IONS-SCNC: 6 MMOL/L (ref 4–13)
BASOPHILS # BLD AUTO: 0.06 THOUSANDS/ΜL (ref 0–0.1)
BASOPHILS NFR BLD AUTO: 1 % (ref 0–1)
BUN SERPL-MCNC: 15 MG/DL (ref 5–25)
CALCIUM SERPL-MCNC: 8.6 MG/DL (ref 8.3–10.1)
CHLORIDE SERPL-SCNC: 105 MMOL/L (ref 100–108)
CO2 SERPL-SCNC: 25 MMOL/L (ref 21–32)
CREAT SERPL-MCNC: 0.84 MG/DL (ref 0.6–1.3)
EOSINOPHIL # BLD AUTO: 0.09 THOUSAND/ΜL (ref 0–0.61)
EOSINOPHIL NFR BLD AUTO: 1 % (ref 0–6)
ERYTHROCYTE [DISTWIDTH] IN BLOOD BY AUTOMATED COUNT: 12.8 % (ref 11.6–15.1)
GFR SERPL CREATININE-BSD FRML MDRD: 94 ML/MIN/1.73SQ M
GLUCOSE SERPL-MCNC: 88 MG/DL (ref 65–140)
HCT VFR BLD AUTO: 48.6 % (ref 36.5–49.3)
HGB BLD-MCNC: 17.2 G/DL (ref 12–17)
IMM GRANULOCYTES # BLD AUTO: 0.02 THOUSAND/UL (ref 0–0.2)
IMM GRANULOCYTES NFR BLD AUTO: 0 % (ref 0–2)
LYMPHOCYTES # BLD AUTO: 1.55 THOUSANDS/ΜL (ref 0.6–4.47)
LYMPHOCYTES NFR BLD AUTO: 19 % (ref 14–44)
MAGNESIUM SERPL-MCNC: 2.4 MG/DL (ref 1.6–2.6)
MCH RBC QN AUTO: 35.9 PG (ref 26.8–34.3)
MCHC RBC AUTO-ENTMCNC: 35.4 G/DL (ref 31.4–37.4)
MCV RBC AUTO: 102 FL (ref 82–98)
MONOCYTES # BLD AUTO: 0.77 THOUSAND/ΜL (ref 0.17–1.22)
MONOCYTES NFR BLD AUTO: 9 % (ref 4–12)
NEUTROPHILS # BLD AUTO: 5.67 THOUSANDS/ΜL (ref 1.85–7.62)
NEUTS SEG NFR BLD AUTO: 70 % (ref 43–75)
NRBC BLD AUTO-RTO: 0 /100 WBCS
PLATELET # BLD AUTO: 151 THOUSANDS/UL (ref 149–390)
PMV BLD AUTO: 10.7 FL (ref 8.9–12.7)
POTASSIUM SERPL-SCNC: 4.4 MMOL/L (ref 3.5–5.3)
RBC # BLD AUTO: 4.79 MILLION/UL (ref 3.88–5.62)
SODIUM SERPL-SCNC: 136 MMOL/L (ref 136–145)
WBC # BLD AUTO: 8.16 THOUSAND/UL (ref 4.31–10.16)

## 2020-08-21 PROCEDURE — 80048 BASIC METABOLIC PNL TOTAL CA: CPT | Performed by: FAMILY MEDICINE

## 2020-08-21 PROCEDURE — 85025 COMPLETE CBC W/AUTO DIFF WBC: CPT | Performed by: FAMILY MEDICINE

## 2020-08-21 PROCEDURE — 99232 SBSQ HOSP IP/OBS MODERATE 35: CPT | Performed by: NURSE PRACTITIONER

## 2020-08-21 PROCEDURE — 99232 SBSQ HOSP IP/OBS MODERATE 35: CPT | Performed by: PSYCHIATRY & NEUROLOGY

## 2020-08-21 PROCEDURE — 83735 ASSAY OF MAGNESIUM: CPT | Performed by: FAMILY MEDICINE

## 2020-08-21 RX ORDER — LEVOTHYROXINE SODIUM 0.07 MG/1
75 TABLET ORAL
Status: DISCONTINUED | OUTPATIENT
Start: 2020-08-21 | End: 2020-08-24 | Stop reason: HOSPADM

## 2020-08-21 RX ADMIN — LISINOPRIL 10 MG: 10 TABLET ORAL at 08:34

## 2020-08-21 RX ADMIN — LEVOTHYROXINE SODIUM 75 MCG: 75 TABLET ORAL at 12:54

## 2020-08-21 RX ADMIN — METOPROLOL TARTRATE 25 MG: 25 TABLET, FILM COATED ORAL at 21:08

## 2020-08-21 RX ADMIN — METOPROLOL TARTRATE 25 MG: 25 TABLET, FILM COATED ORAL at 08:34

## 2020-08-21 NOTE — PROGRESS NOTES
Progress Note - Leona Lentz  1955, 60 y o  male MRN: [id number]    Unit/Bed#: [facility] 713-01 Encounter: [id number]    Primary Care Provider: Mino Paul MD   Date and time admitted to hospital: 8/19/2020 11:37 AM        * Cerebellar hemorrhage (HCC)  Assessment & Plan   Diagnosis: R cerebellar hemorrhage  -CTA: 3x2 cm hemorrhage w/ mild surrounding perifocal edema in R Cerebellum extending to involve cerebellar vermis w/ mild mass effect on 4th ventricle  -MRI Brain: Right superior cerebellar artery hemorrhagic infarct  Local mass effect without hydrocephalus  Sluggish flow within the right vertebral is likely  -Repeat CTH: grossly stable anterior superior R cerebellum parenchymal hematoma w/ surrounding vasogenic edema w/ unchanged regional mass effect & partial effacement of superior 4th ventricle  -Exam: AAOx4, + dysarthria, + RUE dysmetria & ataxia  Plan:  · Repeat CTH 8/20 afternoon  · Neuro checks   · SBP goal <160  · Holding home eliquis, with plan to reinitiate at about two weeks per neurology   · Since pt did not fail therapy he was noncompliant in taking it   · NsGY, Neurology following  · Tele continue   TTE: Systolic function was normal  Ejection fraction was estimated to be 55 %  Although no diagnostic regional wall motion abnormality was identified, this possibility cannot be completely excluded on the basis of this study  Wall thickness was mildly increased   LEFT ATRIUM:  The atrium was mildly dilated   RIGHT ATRIUM:  The atrium was mildly dilated    MITRAL VALVE:  There was mild annular calcification  There was trace regurgitation   AORTIC VALVE:  There was trace regurgitation    TRICUSPID VALVE:  · There was trace regurgitation      · Analgesia: tylenol PRN  · Sedation: none    HTN (hypertension)  Assessment & Plan  Diagnosis: HTN  BP in -200's/110's  Per patient hasn't been taking any of his prescribed medications for over 1 year, per chart review was previously prescribed metoprolol  Pt put on cardene drip, hydralazine PRN, SBP now ranging 140-160s  Plan:  · SBP goal <160  · Cardene drip now off  · Hydralazine 10mg IV  switched to labetalol 10mg IV PRN for SBP <160  · Started PO Lisinopril 10mg, and continued on previously ordered bb   · Now improving monitor for hypotension     A-fib (HCC)  Assessment & Plan  Home meds: Eliquis 5mg BID, pt non-compliant  Plan:  · Tele  · Holding eliquis in setting of acute hemorrhagic stroke secondary to noncompliance with ac in setting of afib  · Esmolol IV for HR >110, was never started  · Starting PO Metoprolol 25mg q12 (pt was not taking any medications)     Dizziness  Assessment & Plan  · Dizziness prior to admission 2/2 cerebellar hemorrhage  Dizziness currently resolved  · -See plan for R cerebellar hemorrhage      VTE Pharmacologic Prophylaxis:   Pharmacologic: Pharmacologic VTE Prophylaxis contraindicated due to helmorrhagic stroke   Mechanical VTE Prophylaxis in Place: Yes    Patient Centered Rounds: I have performed bedside rounds with nursing staff today  Discussions with Specialists or Other Care Team Provider: nursing /neurology     Education and Discussions with Family / Patient: patient     Time Spent for Care: 45 minutes  More than 50% of total time spent on counseling and coordination of care as described above  Current Length of Stay: 2 day(s)    Current Patient Status: Inpatient   Certification Statement: The patient will continue to require additional inpatient hospital stay due to ongoing monitoring and need to improve pressure     Discharge Plan: pt refuses vna or rehab he reports he will only do outpt PT OT if needed  Per casemgmt     Code Status: Level 1 - Full Code      Subjective:   Long discussion with pt in regard to etoh cessation and smoking cessation  Pt reports drinking a 6 pk a day    When asked what happens if he doesn't drink it he says that never happens I work hard and it is my way of winding down  Pt reports he smokes half pack a day and he knows he needs to quit that  He reports speech slurred and feels that his right arm is uncoordinated  Since the stroke     Objective:     Vitals:   Temp (24hrs), Av 5 °F (36 9 °C), Min:98 4 °F (36 9 °C), Max:98 7 °F (37 1 °C)    Temp:  [98 4 °F (36 9 °C)-98 7 °F (37 1 °C)] 98 7 °F (37 1 °C)  HR:  [51-99] 70  Resp:  [16-22] 16  BP: (127-162)/() 128/90  SpO2:  [95 %-100 %] 98 %  Body mass index is 22 56 kg/m²  Input and Output Summary (last 24 hours): Intake/Output Summary (Last 24 hours) at 2020  Last data filed at 2020 1205  Gross per 24 hour   Intake 480 ml   Output    Net 480 ml       Physical Exam:     Physical Exam  Constitutional:       General: He is not in acute distress  Appearance: He is not ill-appearing  HENT:      Head: Normocephalic and atraumatic  Nose: No congestion  Mouth/Throat:      Pharynx: Oropharynx is clear  No oropharyngeal exudate  Cardiovascular:      Rate and Rhythm: Normal rate  Heart sounds: No murmur  No friction rub  No gallop  Pulmonary:      Effort: No respiratory distress  Breath sounds: No stridor  No wheezing, rhonchi or rales  Chest:      Chest wall: No tenderness  Abdominal:      General: There is no distension  Palpations: There is no mass  Tenderness: There is no abdominal tenderness  There is no guarding or rebound  Hernia: No hernia is present  Musculoskeletal:         General: No swelling, tenderness, deformity or signs of injury  Left lower leg: No edema  Skin:     Coloration: Skin is not jaundiced or pale  Findings: No bruising or erythema  Neurological:      Mental Status: He is alert and oriented to person, place, and time        Comments: Slurred speech the faster he talks worse slurr         Additional Data:     Labs:    Results from last 7 days   Lab Units 20  0515   WBC Thousand/uL 8 16   HEMOGLOBIN g/dL 17 2* HEMATOCRIT % 48 6   PLATELETS Thousands/uL 151   NEUTROS PCT % 70   LYMPHS PCT % 19   MONOS PCT % 9   EOS PCT % 1     Results from last 7 days   Lab Units 08/21/20  0511  08/19/20  1158   SODIUM mmol/L 136   < > 137   POTASSIUM mmol/L 4 4   < > 3 8   CHLORIDE mmol/L 105   < > 99*   CO2 mmol/L 25   < > 32   BUN mg/dL 15   < > 16   CREATININE mg/dL 0 84   < > 1 07   ANION GAP mmol/L 6   < > 6   CALCIUM mg/dL 8 6   < > 8 9   ALBUMIN g/dL  --   --  3 8   TOTAL BILIRUBIN mg/dL  --   --  1 12*   ALK PHOS U/L  --   --  52   ALT U/L  --   --  46   AST U/L  --   --  38   GLUCOSE RANDOM mg/dL 88   < > 143*    < > = values in this interval not displayed  Results from last 7 days   Lab Units 08/19/20  1203   INR  1 00                       * I Have Reviewed All Lab Data Listed Above  * Additional Pertinent Lab Tests Reviewed: All Labs Within Last 24 Hours Reviewed    Imaging:    Imaging Reports Reviewed Today Include:reviewed   Recent Cultures (last 7 days):           Last 24 Hours Medication List:   Current Facility-Administered Medications   Medication Dose Route Frequency Provider Last Rate    acetaminophen  650 mg Oral Q6H PRN Kay Ozuna MD      Labetalol HCl  10 mg Intravenous Q6H PRN Kay Ozuna MD      levothyroxine  75 mcg Oral Early Morning ROSALINA Merrill      lisinopril  10 mg Oral Daily Kay Ozuna MD      metoprolol tartrate  25 mg Oral Q12H [address] Kay Ozuna MD      ondansetron  4 mg Intravenous Q6H PRN Kay Ozuna MD          Today, Patient Was Seen By: ROSALINA Merrill    ** Please Note: Dictation voice to text software may have been used in the creation of this document   **

## 2020-08-21 NOTE — PROGRESS NOTES
Progress Note - Neurology   Trena Juarez  60 y o  male MRN: [id number]  Unit/Bed#: [facility] 713-01 Encounter: [id number]        Assessment/Plan :  61 yo male with A-fib, HTN, thyroid dysfunction, noncompliant with prescribed medication for past year, tobacco and alcohol use who presented to ED with 2 days duration of dizziness, dysarthria, gait instability/dysfunction, and nausea/vominting  CTA head and neck on admission revealed right cerebellar hemorrhagic lesion  MRI Brain demonstrating ischemic right cerebellar infarct with hemorrhagic conversion  Etiology concern for cardioembolic in the setting of A-fib with anticoagulation non-compliance  Plan:  1  Infratentorial/ cerebellar ICH: score 1  - repeat CT head tomorrow 8/22/20; if stable will start antiplatelet  - PT/OT/SL following  -  telemetry monitoring  - recommend normotensive BP goal    2  A-fib: non-compliant with Eliquis  - continued on metoprolol tartrate,   - anticoagulation currently on hold; will consider restarting Eliquis for A-fib in approximately 3 weeks from stroke    3  HTN: currently 146/91, range 24 H  119/78- 162/131  - continued on lisinopril; PRN labatolol  - management per Primary team    4  Thyroid dysfunction: TSH 10 5, T4 0 61  - management per Primary team      Results reviewed:  - labs: TSH 10 5, T4 0 61, troponin < 0 02 x2  - 8/20/20 CT head wo contrast: Grossly stable anterosuperior right cerebellum parenchymal hematoma and surrounding vasogenic edema  Unchanged regional mass effect and partial effacement of superior 4th ventricle with stable size of ventricular system  No hydrocephalus  - 8/20/20 Echocardiogram: per report: LVEF 55%, no regional wall abnormality, bilateral atrium dilitation  - 8/20/20 MRI Brain w/wo contrast: per report:  Right superior cerebellar artery hemorrhagic infarct  Local mass effect without hydrocephalus  Sluggish flow within the right vertebral is likely    - 8/19/20 CTA head and neck: per report: 3 1 x 2 0 cm hemorrhage with mild surrounding perifocal edema in the right cerebellum extending to involve the cerebellar vermis may be hypertensive hemorrhage  However consider MRI to exclude any underlying lesion/neoplasm,No large vessel occlusion, Patent vertebrobasilar system, No significant carotid stenosis, No hydrocephalus    Subjective:   Patient continues with RUE clumsiness which he describes as "rubbery feeling" ans slurred speech  Otherwise he has no new complaints  10 point Review of Systems - conducted and negative except as noted in subjective      Vitals: Blood pressure 146/91, pulse 77, temperature 98 4 °F (36 9 °C), resp  rate 18, height 5' 8" (1 727 m), weight 67 3 kg (148 lb 5 9 oz), SpO2 98 %  ,Body mass index is 22 56 kg/m²  MEDS:  Current Facility-Administered Medications   Medication Dose Route Frequency Provider Last Rate    acetaminophen  650 mg Oral Q6H PRN Gevena Schirmer, MD      Labetalol HCl  10 mg Intravenous Q6H PRN Gevena Schirmer, MD      lisinopril  10 mg Oral Daily Gevena Schirmer, MD      metoprolol tartrate  25 mg Oral Q12H [address] Gevena Schirmer, MD      ondansetron  4 mg Intravenous Q6H PRN Gevena Schirmer, MD     :          Physical Exam  Vitals signs and nursing note reviewed  Constitutional:       General: He is not in acute distress  Appearance: He is well-developed  He is not diaphoretic  HENT:      Head: Normocephalic and atraumatic  Mouth/Throat:      Mouth: Mucous membranes are moist    Eyes:      Extraocular Movements: EOM normal       Pupils: Pupils are equal, round, and reactive to light  Cardiovascular:      Rate and Rhythm: Normal rate and regular rhythm  Heart sounds: Normal heart sounds  No murmur  No friction rub  No gallop  Pulmonary:      Effort: Pulmonary effort is normal  No respiratory distress  Breath sounds: Normal breath sounds  No wheezing or rales  Chest:      Chest wall: No tenderness     Abdominal: General: Bowel sounds are normal  There is no distension  Palpations: Abdomen is soft  Tenderness: There is no abdominal tenderness  Musculoskeletal: Normal range of motion  Skin:     General: Skin is warm and dry  Neurological:      Mental Status: He is alert and oriented to person, place, and time  Coordination: Heel to Shin Test normal  Finger-nose-finger test: RUE clumsiness, ataxia  Deep Tendon Reflexes: Strength normal    Psychiatric:         Speech: Speech is slurred  Neurologic Exam     Mental Status   Oriented to person, place, and time  Follows 3 step commands  Attention: normal  Concentration: normal    Speech: slurred   Level of consciousness: alert    Cranial Nerves     CN II   Visual fields full to confrontation  CN III, IV, VI   Pupils are equal, round, and reactive to light  Extraocular motions are normal    Right pupil: Size: 2 mm  Left pupil: Size: 2 mm  Nystagmus: none   Diplopia: none  CN V-XII intact     Motor Exam   Muscle bulk: normal  Overall muscle tone: normal    Strength   Strength 5/5 throughout  Sensory Exam   Light touch normal      Gait, Coordination, and Reflexes     Gait  Gait: (no assessed)    Coordination   Finger-nose-finger test: RUE clumsiness, ataxia  Heel to shin coordination: normal      Lab Results:   I have personally reviewed pertinent reports      CBC:   Results from last 7 days   Lab Units 08/21/20  0515 08/20/20  0449 08/19/20  1158   WBC Thousand/uL 8 16 8 37 8 83   RBC Million/uL 4 79 4 79 5 08   HEMOGLOBIN g/dL 17 2* 17 5* 18 0*   HEMATOCRIT % 48 6 48 5 52 0*   MCV fL 102* 101* 102*   PLATELETS Thousands/uL 151 153 162   BMP/CMP:   Results from last 7 days   Lab Units 08/21/20  0511 08/20/20  0448 08/19/20  1158   SODIUM mmol/L 136 137 137   POTASSIUM mmol/L 4 4 3 3* 3 8   CHLORIDE mmol/L 105 100 99*   CO2 mmol/L 25 30 32   BUN mg/dL 15 13 16   CREATININE mg/dL 0 84 0 74 1 07   CALCIUM mg/dL 8 6 8 6 8 9   AST U/L --   --  38   ALT U/L  --   --  46   ALK PHOS U/L  --   --  52   EGFR ml/min/1 73sq m 94 99 74   TSH:   Results from last 7 days   Lab Units 08/19/20  1158   TSH 3RD GENERATON uIU/mL 10 600*   Coagulation:   Results from last 7 days   Lab Units 08/19/20  1203   INR  1 00     Imaging Studies: I have personally reviewed pertinent reports  and I have personally reviewed pertinent films in PACS     EEG, Pathology, and Other Studies: I have personally reviewed pertinent reports  and I have personally reviewed pertinent films in PACS    VTE Prophylaxis: Sequential compression device (Venodyne)      Counseling / Coordination of Care  Total time spent today 25 minutes  Greater than 50% of total time was spent with the patient and / or family counseling and / or coordination of care  A description of the counseling / coordination of care: coordination of care with SLIM CRNP  Discussion of plan of care with patient at bedside including repeat CT Head tomorrow and medication regimen    Michael Vanegas  will need follow up in in 6 weeks with neurovascular attending  He will not require outpatient neurological testing

## 2020-08-21 NOTE — TELEPHONE ENCOUNTER
----- Message from Yanni Babcock, [address] Katey Morris sent at 8/21/2020 11:54 AM EDT -----  Regarding: HFU  Diagnosis/Reason for follow-up: right hemorrhagic ICH, Afib non-compliant with eliquis, HTN  Subspecialty for follow-up:  stroke clinic  Existing neurologist: none  Recommended timing for follow-up: within 4 weeks  Tests/Labs/Imaging ordered:   AP/Attending: either  Additional notes: Eliquis on hold; will consider restarting in 3 weeks s/p stroke    Thank You so much!

## 2020-08-21 NOTE — ASSESSMENT & PLAN NOTE
Diagnosis: R cerebellar hemorrhage  -CTA: 3x2 cm hemorrhage w/ mild surrounding perifocal edema in R Cerebellum extending to involve cerebellar vermis w/ mild mass effect on 4th ventricle  -MRI Brain: Right superior cerebellar artery hemorrhagic infarct  Local mass effect without hydrocephalus  Sluggish flow within the right vertebral is likely  -Repeat CTH: grossly stable anterior superior R cerebellum parenchymal hematoma w/ surrounding vasogenic edema w/ unchanged regional mass effect & partial effacement of superior 4th ventricle  -Exam: AAOx4, + dysarthria, + RUE dysmetria & ataxia  Plan:  · Repeat CTH 8/20 afternoon  · Neuro checks   · SBP goal <160  · Holding home eliquis, with plan to reinitiate at about two weeks per neurology   · Since pt did not fail therapy he was noncompliant in taking it   · NsGY, Neurology following  · Tele continue   TTE: Systolic function was normal  Ejection fraction was estimated to be 55 %  Although no diagnostic regional wall motion abnormality was identified, this possibility cannot be completely excluded on the basis of this study  Wall thickness was mildly increased   LEFT ATRIUM:  The atrium was mildly dilated   RIGHT ATRIUM:  The atrium was mildly dilated    MITRAL VALVE:  There was mild annular calcification  There was trace regurgitation   AORTIC VALVE:  There was trace regurgitation    TRICUSPID VALVE:  · There was trace regurgitation      · Analgesia: tylenol PRN  · Sedation: none

## 2020-08-21 NOTE — TELEPHONE ENCOUNTER
Reviewed patient chart, inpatient at [facility] at this time  Admitted under stroke pathway following imaging revealing a right cerebellar hemorraghic lesion (ischemic right cerebellar infarct with hemorrhagic conversion)  Plan per inpatient therapy recommendations is for patient to be discharged to home with in-home OT when medically cleared  Per inpatient CM, patient resides with cousin Ainsley Rock  Called patient on hospital room phone with no answer  Not able to leave messages on hospital room phones  Called patient at [phone] with no answer  Left message on voicemail box for call back      I will continue to follow up during hospitalization with inpatient neurology recommendations as well as after discharge to assist

## 2020-08-21 NOTE — ASSESSMENT & PLAN NOTE
Home meds: Eliquis 5mg BID, pt non-compliant  Plan:  · Tele  · Holding eliquis in setting of acute hemorrhagic stroke secondary to noncompliance with ac in setting of afib  · Esmolol IV for HR >110, was never started  · Starting PO Metoprolol 25mg q12 (pt was not taking any medications)

## 2020-08-21 NOTE — ASSESSMENT & PLAN NOTE
· Dizziness prior to admission 2/2 cerebellar hemorrhage  Dizziness currently resolved    · -See plan for R cerebellar hemorrhage no

## 2020-08-21 NOTE — ASSESSMENT & PLAN NOTE
Diagnosis: HTN  BP in -200's/110's  Per patient hasn't been taking any of his prescribed medications for over 1 year, per chart review was previously prescribed metoprolol  Pt put on cardene drip, hydralazine PRN, SBP now ranging 140-160s  Plan:  · SBP goal <160  · Cardene drip now off  · Hydralazine 10mg IV  switched to labetalol 10mg IV PRN for SBP <160  · Started PO Lisinopril 10mg, and continued on previously ordered bb   · Now improving monitor for hypotension

## 2020-08-21 NOTE — RESTORATIVE TECHNICIAN NOTE
Restorative Specialist Mobility Note       Activity: Ambulate in gao, Ambulate in room, Bathroom privileges, Chair, Dangle, Stand at bedside(Educated/encouraged pt to ambulate with assistance 3-4 x's/day  Bed alarm on   Pt callbell, phone/tray within reach )     Assistive Device: Susana WILLIS, Restorative Technician, [employer] [facility]

## 2020-08-21 NOTE — SOCIAL WORK
Home PT recommendations:     A post acute care recommendation was made by your care team for [facility] 80  Discussed Freedom of Choice with patient  List of agencies given to patient via in person  patient aware the list is custom filtered for them by preference  and that [facility] post acute providers are designated  Pt declined home PT services and asked for OP PT  ROSALINA Braxton advised

## 2020-08-22 ENCOUNTER — APPOINTMENT (INPATIENT)
Dept: RADIOLOGY | Facility: HOSPITAL | Age: 65
DRG: 064 | End: 2020-08-22
Payer: COMMERCIAL

## 2020-08-22 PROBLEM — I72.9 ANEURYSM (HCC): Status: ACTIVE | Noted: 2020-08-22

## 2020-08-22 PROBLEM — E03.9 HYPOTHYROID: Status: ACTIVE | Noted: 2020-08-22

## 2020-08-22 LAB
CHOLEST SERPL-MCNC: 120 MG/DL (ref 50–200)
EST. AVERAGE GLUCOSE BLD GHB EST-MCNC: 97 MG/DL
HBA1C MFR BLD: 5 %
HDLC SERPL-MCNC: 54 MG/DL
LDLC SERPL CALC-MCNC: 48 MG/DL (ref 0–100)
TRIGL SERPL-MCNC: 92 MG/DL

## 2020-08-22 PROCEDURE — 99232 SBSQ HOSP IP/OBS MODERATE 35: CPT | Performed by: NURSE PRACTITIONER

## 2020-08-22 PROCEDURE — G1004 CDSM NDSC: HCPCS

## 2020-08-22 PROCEDURE — 80061 LIPID PANEL: CPT | Performed by: NURSE PRACTITIONER

## 2020-08-22 PROCEDURE — 70496 CT ANGIOGRAPHY HEAD: CPT

## 2020-08-22 PROCEDURE — 99232 SBSQ HOSP IP/OBS MODERATE 35: CPT | Performed by: PSYCHIATRY & NEUROLOGY

## 2020-08-22 PROCEDURE — 83036 HEMOGLOBIN GLYCOSYLATED A1C: CPT | Performed by: NURSE PRACTITIONER

## 2020-08-22 RX ADMIN — LISINOPRIL 10 MG: 10 TABLET ORAL at 08:16

## 2020-08-22 RX ADMIN — IOHEXOL 85 ML: 350 INJECTION, SOLUTION INTRAVENOUS at 12:31

## 2020-08-22 RX ADMIN — METOPROLOL TARTRATE 25 MG: 25 TABLET, FILM COATED ORAL at 20:07

## 2020-08-22 RX ADMIN — METOPROLOL TARTRATE 25 MG: 25 TABLET, FILM COATED ORAL at 08:16

## 2020-08-22 RX ADMIN — LEVOTHYROXINE SODIUM 75 MCG: 75 TABLET ORAL at 05:47

## 2020-08-22 NOTE — ASSESSMENT & PLAN NOTE
Home meds: Eliquis 5mg BID, pt non-compliant  · Tele  · Holding eliquis in setting of acute hemorrhagic stroke secondary to noncompliance with ac in setting of afib  · Esmolol IV for HR >110, was never started  · Starting PO Metoprolol 25mg q12 (pt was not taking any medications, at home)

## 2020-08-22 NOTE — PROGRESS NOTES
Progress Note - Neurology   Jabaribunny Bah  60 y o  male MRN: [id number]  Unit/Bed#: [facility] 713-01 Encounter: [id number]        Assessment/Plan :  61 yo male with A-fib, HTN, thyroid dysfunction, noncompliant with prescribed medication for past year, tobacco and alcohol use who presented to ED with 2 days duration of dizziness, dysarthria, gait instability/dysfunction, and nausea/vominting  CTA head and neck on admission revealed right cerebellar hemorrhagic lesion  MRI Brain demonstrating ischemic right cerebellar infarct with hemorrhagic conversion  Etiology concern for cardioembolic in the setting of A-fib with anticoagulation non-compliance  Plan:  1  Infratentorial/ cerebellar ICH: score 1  - Repeat CT showed some mildly increased hemorrhage, will wait till 8/24/20 to resume antiplatelet if repeat CT that mornign vandana    - PT/OT/SL following  -  telemetry monitoring  - recommend normotensive BP goal    2  A-fib: non-compliant with Eliquis  - continued on metoprolol tartrate,   - anticoagulation currently on hold; plan to restart Eliquis for A-fib in approximately 3 weeks from stroke  Aspirin bridge in meanwhile starting 8/24/20 if stable  3  HTN: currently 146/91, range 24 H  119/78- 162/131  - continued on lisinopril; PRN labatolol  - management per Primary team    4  Thyroid dysfunction: TSH 10 5, T4 0 61  - management per Primary team      Results reviewed:  - labs: TSH 10 5, T4 0 61, troponin < 0 02 x2  - 8/20/20 CT head wo contrast: Grossly stable anterosuperior right cerebellum parenchymal hematoma and surrounding vasogenic edema  Unchanged regional mass effect and partial effacement of superior 4th ventricle with stable size of ventricular system  No hydrocephalus  - 8/20/20 Echocardiogram: per report: LVEF 55%, no regional wall abnormality, bilateral atrium dilitation  - 8/20/20 MRI Brain w/wo contrast: per report:  Right superior cerebellar artery hemorrhagic infarct    Local mass effect without hydrocephalus  Sluggish flow within the right vertebral is likely  - 8/19/20 CTA head and neck: per report: 3 1 x 2 0 cm hemorrhage with mild surrounding perifocal edema in the right cerebellum extending to involve the cerebellar vermis may be hypertensive hemorrhage  However consider MRI to exclude any underlying lesion/neoplasm,No large vessel occlusion, Patent vertebrobasilar system, No significant carotid stenosis, No hydrocephalus    Subjective:   Patient continues with RUE clumsiness which he describes as "rubbery feeling" ans slurred speech  Extensive 12 point ROS otherwise negative  Vitals: Blood pressure 124/88, pulse 67, temperature 97 8 °F (36 6 °C), resp  rate 18, height 5' 8" (1 727 m), weight 68 kg (149 lb 14 6 oz), SpO2 99 %  ,Body mass index is 22 79 kg/m²  MEDS:  Current Facility-Administered Medications   Medication Dose Route Frequency Provider Last Rate    acetaminophen  650 mg Oral Q6H PRN Marcos Coburn MD      Labetalol HCl  10 mg Intravenous Q6H PRN Marcos Coburn MD      levothyroxine  75 mcg Oral Early Morning ROSALINA Tellez      lisinopril  10 mg Oral Daily Marcos Coburn MD      metoprolol tartrate  25 mg Oral Q12H [address] Marcos Coburn MD      ondansetron  4 mg Intravenous Q6H PRN Marcos Coburn MD     :          Physical Exam  Vitals signs and nursing note reviewed  Constitutional:       General: He is not in acute distress  Appearance: He is well-developed  He is not diaphoretic  HENT:      Head: Normocephalic and atraumatic  Mouth/Throat:      Mouth: Mucous membranes are moist    Eyes:      Extraocular Movements: EOM normal       Pupils: Pupils are equal, round, and reactive to light  Cardiovascular:      Rate and Rhythm: Normal rate and regular rhythm  Heart sounds: Normal heart sounds  No murmur  No friction rub  No gallop  Pulmonary:      Effort: Pulmonary effort is normal  No respiratory distress        Breath sounds: Normal breath sounds  No wheezing or rales  Chest:      Chest wall: No tenderness  Abdominal:      General: Bowel sounds are normal  There is no distension  Palpations: Abdomen is soft  Tenderness: There is no abdominal tenderness  Musculoskeletal: Normal range of motion  Skin:     General: Skin is warm and dry  Neurological:      Mental Status: He is alert and oriented to person, place, and time  Coordination: Heel to Shin Test normal  Finger-nose-finger test: RUE clumsiness, ataxia  Deep Tendon Reflexes: Strength normal    Psychiatric:         Speech: Speech is slurred  Neurologic Exam     Mental Status   Oriented to person, place, and time  Follows 3 step commands  Attention: normal  Concentration: normal    Speech: slurred   Level of consciousness: alert    Cranial Nerves     CN II   Visual fields full to confrontation  CN III, IV, VI   Pupils are equal, round, and reactive to light  Extraocular motions are normal    Right pupil: Size: 2 mm  Left pupil: Size: 2 mm  Nystagmus: none   Diplopia: none  CN V-XII intact     Motor Exam   Muscle bulk: normal  Overall muscle tone: normal    Strength   Strength 5/5 throughout  Sensory Exam   Light touch normal      Gait, Coordination, and Reflexes     Gait  Gait: (no assessed)    Coordination   Finger-nose-finger test: RUE clumsiness, ataxia  Heel to shin coordination: normal      Lab Results:   I have personally reviewed pertinent reports      CBC:   Results from last 7 days   Lab Units 08/21/20  0515 08/20/20  0449 08/19/20  1158   WBC Thousand/uL 8 16 8 37 8 83   RBC Million/uL 4 79 4 79 5 08   HEMOGLOBIN g/dL 17 2* 17 5* 18 0*   HEMATOCRIT % 48 6 48 5 52 0*   MCV fL 102* 101* 102*   PLATELETS Thousands/uL 151 153 162   BMP/CMP:   Results from last 7 days   Lab Units 08/21/20  0511 08/20/20  0448 08/19/20  1158   SODIUM mmol/L 136 137 137   POTASSIUM mmol/L 4 4 3 3* 3 8   CHLORIDE mmol/L 105 100 99*   CO2 mmol/L 25 30 32   BUN mg/dL 15 13 16   CREATININE mg/dL 0 84 0 74 1 07   CALCIUM mg/dL 8 6 8 6 8 9   AST U/L  --   --  38   ALT U/L  --   --  46   ALK PHOS U/L  --   --  52   EGFR ml/min/1 73sq m 94 99 74   TSH:   Results from last 7 days   Lab Units 08/19/20  1158   TSH 3RD GENERATON uIU/mL 10 600*   Coagulation:   Results from last 7 days   Lab Units 08/19/20  1203   INR  1 00     Imaging Studies: I have personally reviewed pertinent reports  and I have personally reviewed pertinent films in PACS     EEG, Pathology, and Other Studies: I have personally reviewed pertinent reports  and I have personally reviewed pertinent films in PACS    VTE Prophylaxis: Sequential compression device (Venodyne)         Chuck Schmitt  will need follow up in in 6 weeks with neurovascular attending  He will not require outpatient neurological testing

## 2020-08-22 NOTE — ASSESSMENT & PLAN NOTE
Diagnosis: R cerebellar hemorrhage  -CTA 8/19 : 3x2 cm hemorrhage w/ mild surrounding perifocal edema in R Cerebellum extending to involve cerebellar vermis w/ mild mass effect on 4th ventricle  -MRI Brain 8/20 : Right superior cerebellar artery hemorrhagic infarct  Local mass effect without hydrocephalus  Sluggish flow within the right vertebral is likely  -Repeat CT 8/20: grossly stable anterior superior R cerebellum parenchymal hematoma w/ surrounding vasogenic edema w/ unchanged regional mass effect & partial effacement of superior 4th ventricle  - CTA 8/22: Minor interval increase in the hemorrhage related to right superior cerebellar arterial infarct   Stable Deformity of the 4th ventricle no indication of hydrocephalus  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   Neurovascular consultation suggested  -Exam: AAOx4, + dysarthria, + RUE dysmetria & ataxia  · Neuro checks   · SBP goal <160  · Holding home eliquis, with plan to reinitiate at about two weeks per neurology   · Since pt did not fail therapy he was noncompliant in taking it   · Neurology following appreciated:   · Tele continue   TTE: Systolic function was normal  Ejection fraction was estimated to be 55 %  Although no diagnostic regional wall motion abnormality was identified, this possibility cannot be completely excluded on the basis of this study  Wall thickness was mildly increased   LEFT ATRIUM:  The atrium was mildly dilated   RIGHT ATRIUM:  The atrium was mildly dilated    MITRAL VALVE:  There was mild annular calcification  There was trace regurgitation   AORTIC VALVE:  There was trace regurgitation    TRICUSPID VALVE:  · There was trace regurgitation      · Analgesia: tylenol PRN  · Sedation: none

## 2020-08-22 NOTE — ASSESSMENT & PLAN NOTE
· Dizziness prior to admission 2/2 cerebellar hemorrhage  Dizziness currently resolved    · -See plan for R cerebellar hemorrhage

## 2020-08-22 NOTE — ASSESSMENT & PLAN NOTE
· tsh 10 600  · Free t4 0 61  · Pt reports was previously on synthroid , past records over 6 months ago pt was on 125mcg which pt states he was never taking   · Started this am on 75mcg po daily  · Repeat labs in 4 - 6 weeks

## 2020-08-22 NOTE — ASSESSMENT & PLAN NOTE
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

## 2020-08-22 NOTE — PROGRESS NOTES
Progress Note - David Garcia  1955, 60 y o  male MRN: [id number]    Unit/Bed#: [facility] 713-01 Encounter: [id number]    Primary Care Provider: Carin Flores MD   Date and time admitted to hospital: 8/19/2020 11:37 AM        * Cerebellar hemorrhage ([address] )  Assessment & Plan   Diagnosis: R cerebellar hemorrhage  -CTA 8/19 : 3x2 cm hemorrhage w/ mild surrounding perifocal edema in R Cerebellum extending to involve cerebellar vermis w/ mild mass effect on 4th ventricle  -MRI Brain 8/20 : Right superior cerebellar artery hemorrhagic infarct  Local mass effect without hydrocephalus  Sluggish flow within the right vertebral is likely  -Repeat CT 8/20: grossly stable anterior superior R cerebellum parenchymal hematoma w/ surrounding vasogenic edema w/ unchanged regional mass effect & partial effacement of superior 4th ventricle  - CTA 8/22: Minor interval increase in the hemorrhage related to right superior cerebellar arterial infarct   Stable Deformity of the 4th ventricle no indication of hydrocephalus  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   Neurovascular consultation suggested  -Exam: AAOx4, + dysarthria, + RUE dysmetria & ataxia  · Neuro checks   · SBP goal <160  · Holding home eliquis, with plan to reinitiate at about two weeks per neurology   · Since pt did not fail therapy he was noncompliant in taking it   · Neurology following appreciated:   · Tele continue   TTE: Systolic function was normal  Ejection fraction was estimated to be 55 %  Although no diagnostic regional wall motion abnormality was identified, this possibility cannot be completely excluded on the basis of this study  Wall thickness was mildly increased   LEFT ATRIUM:  The atrium was mildly dilated   RIGHT ATRIUM:  The atrium was mildly dilated    MITRAL VALVE:  There was mild annular calcification  There was trace regurgitation     AORTIC VALVE:  There was trace regurgitation    TRICUSPID VALVE:  · There was trace regurgitation  · Analgesia: tylenol PRN  · Sedation: none    Aneurysm ([address] )  Assessment & Plan  Noted on repeat cta today 8/22 : 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   Neurovascular consultation suggested  · Not noted on prior ct but can be visually observed not official read  · cta now with read discussed with Dr Naomy Mason would   · Have neurosurgery comment and likely follow post discharge   · Discussed with neurosurgery    Hypothyroid  Assessment & Plan  · tsh 10 600  · Free t4 0 61  · Pt reports was previously on synthroid , past records over 6 months ago pt was on 125mcg which pt states he was never taking   · Started this am on 75mcg po daily  · Repeat labs in 4 - 6 weeks     A-fib [facility])  Assessment & Plan  Home meds: Eliquis 5mg BID, pt non-compliant  · Tele  · Holding eliquis in setting of acute hemorrhagic stroke secondary to noncompliance with ac in setting of afib  · Esmolol IV for HR >110, was never started  · Starting PO Metoprolol 25mg q12 (pt was not taking any medications, at home)     Dizziness  Assessment & Plan  · Dizziness prior to admission 2/2 cerebellar hemorrhage  Dizziness currently resolved  · -See plan for R cerebellar hemorrhage      VTE Pharmacologic Prophylaxis:   Pharmacologic: Pharmacologic VTE Prophylaxis contraindicated due to none due to hemorrhagic stroke recently   Mechanical VTE Prophylaxis in Place: Yes    Patient Centered Rounds: I have performed bedside rounds with nursing staff today  Discussions with Specialists or Other Care Team Provider: nursing     Education and Discussions with Family / Patient: patient     Time Spent for Care: 45 minutes  More than 50% of total time spent on counseling and coordination of care as described above      Current Length of Stay: 3 day(s)    Current Patient Status: Inpatient   Certification Statement: The patient will continue to require additional inpatient hospital stay due to ongoing need for repeat ct in 2 days due to subtle increase     Discharge Plan: pt refusing rehab wants to go home with out patient pt ot     Code Status: Level 1 - Full Code      Subjective:   Long discussion had with the pt in regard to compliance with meds moving forward  Smoking cessation and high risk stroke, ac as well as hypothyroidism, blood pressures   Explained results of cta with aneurysm  Pt still feels a little uncoordinated in right hand  Pt is a  and right handed therefore rather frustrated by lack of coordination with right arm    Objective:     Vitals:   Temp (24hrs), Av 2 °F (36 8 °C), Min:97 8 °F (36 6 °C), Max:98 4 °F (36 9 °C)    Temp:  [97 8 °F (36 6 °C)-98 4 °F (36 9 °C)] 98 4 °F (36 9 °C)  HR:  [50-89] 89  Resp:  [18] 18  BP: (124-153)/() 129/77  SpO2:  [97 %-99 %] 98 %  Body mass index is 22 79 kg/m²  Input and Output Summary (last 24 hours): Intake/Output Summary (Last 24 hours) at 2020 1902  Last data filed at 2020 1701  Gross per 24 hour   Intake 960 ml   Output 815 ml   Net 145 ml       Physical Exam:     Physical Exam  Constitutional:       Appearance: He is not toxic-appearing or diaphoretic  HENT:      Nose: No congestion or rhinorrhea  Cardiovascular:      Rate and Rhythm: Normal rate  Rhythm irregular  Musculoskeletal:         General: No swelling             Additional Data:     Labs:    Results from last 7 days   Lab Units 20  0515   WBC Thousand/uL 8 16   HEMOGLOBIN g/dL 17 2*   HEMATOCRIT % 48 6   PLATELETS Thousands/uL 151   NEUTROS PCT % 70   LYMPHS PCT % 19   MONOS PCT % 9   EOS PCT % 1     Results from last 7 days   Lab Units 20  0511  20  1158   SODIUM mmol/L 136   < > 137   POTASSIUM mmol/L 4 4   < > 3 8   CHLORIDE mmol/L 105   < > 99*   CO2 mmol/L 25   < > 32   BUN mg/dL 15   < > 16   CREATININE mg/dL 0 84   < > 1 07   ANION GAP mmol/L 6   < > 6   CALCIUM mg/dL 8 6   < > 8 9   ALBUMIN g/dL  --   --  3 8   TOTAL BILIRUBIN mg/dL  --   --  1 12*   ALK PHOS U/L  --   --  52   ALT U/L  --   --  46   AST U/L  --   --  38   GLUCOSE RANDOM mg/dL 88   < > 143*    < > = values in this interval not displayed  Results from last 7 days   Lab Units 08/19/20  1203   INR  1 00         Results from last 7 days   Lab Units 08/22/20  0445   HEMOGLOBIN A1C % 5 0               * I Have Reviewed All Lab Data Listed Above  * Additional Pertinent Lab Tests Reviewed: All Labs Within Last 24 Hours Reviewed    Imaging:    Imaging Reports Reviewed Today Include: reviewed     Recent Cultures (last 7 days):           Last 24 Hours Medication List:   Current Facility-Administered Medications   Medication Dose Route Frequency Provider Last Rate    acetaminophen  650 mg Oral Q6H PRN Betsey Pa MD      Labetalol HCl  10 mg Intravenous Q6H PRN Betsey Pa MD      levothyroxine  75 mcg Oral Early Morning ROSALINA Murillo      lisinopril  10 mg Oral Daily Betsey Pa MD      metoprolol tartrate  25 mg Oral Q12H [address] Betsey Pa MD      ondansetron  4 mg Intravenous Q6H PRN Betsey Pa MD          Today, Patient Was Seen By: ROSALINA Murillo    ** Please Note: Dictation voice to text software may have been used in the creation of this document   **

## 2020-08-23 PROCEDURE — 99232 SBSQ HOSP IP/OBS MODERATE 35: CPT | Performed by: NURSE PRACTITIONER

## 2020-08-23 RX ORDER — BENZONATATE 100 MG/1
100 CAPSULE ORAL 3 TIMES DAILY
Status: COMPLETED | OUTPATIENT
Start: 2020-08-23 | End: 2020-08-24

## 2020-08-23 RX ADMIN — LEVOTHYROXINE SODIUM 75 MCG: 75 TABLET ORAL at 05:16

## 2020-08-23 RX ADMIN — METOPROLOL TARTRATE 25 MG: 25 TABLET, FILM COATED ORAL at 08:36

## 2020-08-23 RX ADMIN — METOPROLOL TARTRATE 25 MG: 25 TABLET, FILM COATED ORAL at 21:46

## 2020-08-23 RX ADMIN — LISINOPRIL 10 MG: 10 TABLET ORAL at 08:36

## 2020-08-23 RX ADMIN — BENZONATATE 100 MG: 100 CAPSULE ORAL at 21:46

## 2020-08-23 NOTE — TREATMENT PLAN
Neurosurgery contacted by primary team today regarding CTA from 8/22/2020    CTA head and neck w/wo, 8/22/2020: interval increase in right cerebellar infarct  5x3mm ACOMM aneurysm  Upon review of imaging with attending, aneurysm present on previous study  Recommend outpatient follow up in our office in 2-4 weeks  Call with questions or concerns

## 2020-08-23 NOTE — PROGRESS NOTES
Progress Note - Leona Lentz  1955, 60 y o  male MRN: [id number]    Unit/Bed#: [facility] 713-01 Encounter: [id number]    Primary Care Provider: Mino Paul MD   Date and time admitted to hospital: 8/19/2020 11:37 AM        * Cerebellar hemorrhage ([address] )  Assessment & Plan   Diagnosis: R cerebellar hemorrhage  -CTA 8/19 : 3x2 cm hemorrhage w/ mild surrounding perifocal edema in R Cerebellum extending to involve cerebellar vermis w/ mild mass effect on 4th ventricle  -MRI Brain 8/20 : Right superior cerebellar artery hemorrhagic infarct  Local mass effect without hydrocephalus  Sluggish flow within the right vertebral is likely  -Repeat CT 8/20: grossly stable anterior superior R cerebellum parenchymal hematoma w/ surrounding vasogenic edema w/ unchanged regional mass effect & partial effacement of superior 4th ventricle  - CTA 8/22: Minor interval increase in the hemorrhage related to right superior cerebellar arterial infarct   Stable Deformity of the 4th ventricle no indication of hydrocephalus  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   Neurovascular consultation suggested  -Exam: AAOx4, + dysarthria, + RUE dysmetria & ataxia  - plan after discussion with neurology is to hopefully initiate aspirin after repeat CT scan on Monday) (Ordered for 10 am 8/24), if current hemorrhage remains stable would discussed with Neurology likely okay to start aspirin  - patient refused inpt rehab, refusing vna but agreeable to go to outpt rehab at [facility] as his daughter is a nurse there  · Neuro checks   · SBP goal <160  · Holding home eliquis, with plan to reinitiate at about two weeks per neurology   · Since pt did not fail therapy he was noncompliant in taking it   · Neurology following appreciated:   · Tele continue   TTE: Systolic function was normal  Ejection fraction was estimated to be 55 %    Although no diagnostic regional wall motion abnormality was identified, this possibility cannot be completely excluded on the basis of this study  Wall thickness was mildly increased   LEFT ATRIUM:  The atrium was mildly dilated   RIGHT ATRIUM:  The atrium was mildly dilated    MITRAL VALVE:  There was mild annular calcification  There was trace regurgitation   AORTIC VALVE:  There was trace regurgitation    TRICUSPID VALVE:  · There was trace regurgitation  · Analgesia: tylenol PRN  · Sedation: none    Aneurysm ([address] )  Assessment & Plan  Noted on repeat cta today 8/22 : 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   Neurovascular consultation suggested       · Not noted on prior ct but can be visually observed not official read  · cta now with read discussed with Dr Chidi Turner   · Discussed with neurosurgery plan to follow-up in 2-4 weeks      Hypothyroid  Assessment & Plan  · tsh 10 600  · Free t4 0 61  · Pt reports was previously on synthroid , past records over 6 months ago pt was on 125mcg which pt states he was never taking   · Started this am on 75mcg po daily  · Repeat labs in 4 - 6 weeks     HTN (hypertension)  Assessment & Plan  Diagnosis: HTN  BP in -200's/110's  Per patient hasn't been taking any of his prescribed medications for over 1 year, per chart review was previously prescribed metoprolol  Pt put on cardene drip, hydralazine PRN, SBP now ranging 140-160s  Plan:  · SBP goal <160  · Cardene drip now off  · Hydralazine 10mg IV  switched to labetalol 10mg IV PRN for SBP <160  · Started PO Lisinopril 10mg, and continued on previously ordered bb   · Now improving monitor for hypotension     A-fib ([facility] )  Assessment & Plan  Home meds: Eliquis 5mg BID, pt non-compliant  · Tele  · Holding eliquis in setting of acute hemorrhagic stroke secondary to noncompliance with ac in setting of afib  · Esmolol IV for HR >110, was never started  · Starting PO Metoprolol 25mg q12 (pt was not taking any medications, at home)   · Repeat CT scan Monday, if okay initiate aspirin    Dizziness  Assessment & Plan  · Dizziness prior to admission 2/2 cerebellar hemorrhage  Dizziness currently resolved  · -See plan for R cerebellar hemorrhage      VTE Pharmacologic Prophylaxis:   Pharmacologic: Pharmacologic VTE Prophylaxis contraindicated due to due to hemorrhagic conversion   Mechanical VTE Prophylaxis in Place: Yes    Patient Centered Rounds: I have performed bedside rounds with nursing staff today  Discussions with Specialists or Other Care Team Provider: nursing neurology     Education and Discussions with Family / Patient: patient     Time Spent for Care: 45 minutes  More than 50% of total time spent on counseling and coordination of care as described above  Current Length of Stay: 4 day(s)    Current Patient Status: Inpatient   Certification Statement: The patient will continue to require additional inpatient hospital stay due to pending ct head tomorrow     Discharge Plan: home with outpt pt ot     Code Status: Level 1 - Full Code      Subjective:   Pt is doing better, able to walk better still with right arm fine moter difficulty  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 in regard to therapy  Pt is agreeable to work hard to stop smoking and drinking  Pt states he has two grandchildren to do this with  No n/v/d no sob but does have a small dry cough  Objective:     Vitals:   Temp (24hrs), Av 9 °F (37 2 °C), Min:98 4 °F (36 9 °C), Max:99 2 °F (37 3 °C)    Temp:  [98 4 °F (36 9 °C)-99 2 °F (37 3 °C)] 99 °F (37 2 °C)  HR:  [60-88] 70  Resp:  [16-18] 16  BP: (133-149)/(81-95) 133/83  SpO2:  [97 %-100 %] 99 %  Body mass index is 23 13 kg/m²  Input and Output Summary (last 24 hours): Intake/Output Summary (Last 24 hours) at 2020 1911  Last data filed at 2020 1700  Gross per 24 hour   Intake 860 ml   Output 50 ml   Net 810 ml       Physical Exam:     Physical Exam  Constitutional:       General: He is not in acute distress  Appearance: He is not ill-appearing or diaphoretic  HENT:      Head: Normocephalic  Nose: No congestion  Mouth/Throat:      Pharynx: Oropharynx is clear  Eyes:      Conjunctiva/sclera: Conjunctivae normal    Cardiovascular:      Rate and Rhythm: Normal rate  Heart sounds: No murmur  Pulmonary:      Effort: No respiratory distress  Breath sounds: No wheezing or rales  Abdominal:      General: There is no distension  Palpations: There is no mass  Tenderness: There is no abdominal tenderness  There is no guarding or rebound  Hernia: No hernia is present  Musculoskeletal:         General: No swelling, tenderness or deformity  Comments: Right arm inability to perform fine motor skills   Neurological:      Mental Status: He is alert and oriented to person, place, and time  Psychiatric:         Behavior: Behavior normal          Additional Data:     Labs:    Results from last 7 days   Lab Units 08/21/20  0515   WBC Thousand/uL 8 16   HEMOGLOBIN g/dL 17 2*   HEMATOCRIT % 48 6   PLATELETS Thousands/uL 151   NEUTROS PCT % 70   LYMPHS PCT % 19   MONOS PCT % 9   EOS PCT % 1     Results from last 7 days   Lab Units 08/21/20  0511  08/19/20  1158   SODIUM mmol/L 136   < > 137   POTASSIUM mmol/L 4 4   < > 3 8   CHLORIDE mmol/L 105   < > 99*   CO2 mmol/L 25   < > 32   BUN mg/dL 15   < > 16   CREATININE mg/dL 0 84   < > 1 07   ANION GAP mmol/L 6   < > 6   CALCIUM mg/dL 8 6   < > 8 9   ALBUMIN g/dL  --   --  3 8   TOTAL BILIRUBIN mg/dL  --   --  1 12*   ALK PHOS U/L  --   --  52   ALT U/L  --   --  46   AST U/L  --   --  38   GLUCOSE RANDOM mg/dL 88   < > 143*    < > = values in this interval not displayed  Results from last 7 days   Lab Units 08/19/20  1203   INR  1 00         Results from last 7 days   Lab Units 08/22/20  0445   HEMOGLOBIN A1C % 5 0               * I Have Reviewed All Lab Data Listed Above  * Additional Pertinent Lab Tests Reviewed:  All Labs Within Last 24 Hours Reviewed    Imaging:    Imaging Reports Reviewed Today Include:reviewed     Recent Cultures (last 7 days):           Last 24 Hours Medication List:   Current Facility-Administered Medications   Medication Dose Route Frequency Provider Last Rate    acetaminophen  650 mg Oral Q6H PRN Jcarlos Ralph MD      benzonatate  100 mg Oral TID ROSALINA Gray      Labetalol HCl  10 mg Intravenous Q6H PRN Jcarlos Ralph MD      levothyroxine  75 mcg Oral Early Morning ROSALINA Gray      lisinopril  10 mg Oral Daily Jcarlos Ralph MD      metoprolol tartrate  25 mg Oral Q12H [address] Jcarlos Ralph MD      ondansetron  4 mg Intravenous Q6H PRN Jcarlos Ralph MD          Today, Patient Was Seen By: ROSALINA Gray    ** Please Note: Dictation voice to text software may have been used in the creation of this document   **

## 2020-08-23 NOTE — ASSESSMENT & PLAN NOTE
Noted on repeat cta today 8/22 : 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   Neurovascular consultation suggested       · Not noted on prior ct but can be visually observed not official read  · cta now with read discussed with Dr Sarah August   · Discussed with neurosurgery plan to follow-up in 2-4 weeks

## 2020-08-23 NOTE — ASSESSMENT & PLAN NOTE
Diagnosis: R cerebellar hemorrhage  -CTA 8/19 : 3x2 cm hemorrhage w/ mild surrounding perifocal edema in R Cerebellum extending to involve cerebellar vermis w/ mild mass effect on 4th ventricle  -MRI Brain 8/20 : Right superior cerebellar artery hemorrhagic infarct  Local mass effect without hydrocephalus  Sluggish flow within the right vertebral is likely  -Repeat CT 8/20: grossly stable anterior superior R cerebellum parenchymal hematoma w/ surrounding vasogenic edema w/ unchanged regional mass effect & partial effacement of superior 4th ventricle  - CTA 8/22: Minor interval increase in the hemorrhage related to right superior cerebellar arterial infarct   Stable Deformity of the 4th ventricle no indication of hydrocephalus  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   Neurovascular consultation suggested  -Exam: AAOx4, + dysarthria, + RUE dysmetria & ataxia  - plan after discussion with neurology is to hopefully initiate aspirin after repeat CT scan on Monday) (Ordered for 10 am 8/24), if current hemorrhage remains stable would discussed with Neurology likely okay to start aspirin  - patient refused inpt rehab, refusing vna but [facility]  · Neuro checks   · SBP goal <160  · Holding home eliquis, with plan to reinitiate at about two weeks per neurology   · Since pt did not fail therapy he was noncompliant in taking it   · Neurology following appreciated:   · Tele continue   TTE: Systolic function was normal  Ejection fraction was estimated to be 55 %  Although no diagnostic regional wall motion abnormality was identified, this possibility cannot be completely excluded on the basis of this study  Wall thickness was mildly increased   LEFT ATRIUM:  The atrium was mildly dilated   RIGHT ATRIUM:  The atrium was mildly dilated    MITRAL VALVE:  There was mild annular calcification  There was trace regurgitation    Luann Reddy VALVE:  There was trace regurgitation    TRICUSPID VALVE:  · There was trace regurgitation      · Analgesia: tylenol PRN  · Sedation: none

## 2020-08-23 NOTE — ASSESSMENT & PLAN NOTE
Home meds: Eliquis 5mg BID, pt non-compliant  · Tele  · Holding eliquis in setting of acute hemorrhagic stroke secondary to noncompliance with ac in setting of afib  · Esmolol IV for HR >110, was never started  · Starting PO Metoprolol 25mg q12 (pt was not taking any medications, at home)   · Repeat CT scan Monday, if okay initiate aspirin

## 2020-08-24 ENCOUNTER — APPOINTMENT (INPATIENT)
Dept: RADIOLOGY | Facility: HOSPITAL | Age: 65
DRG: 064 | End: 2020-08-24
Payer: COMMERCIAL

## 2020-08-24 VITALS
OXYGEN SATURATION: 97 % | HEIGHT: 68 IN | TEMPERATURE: 98.6 F | BODY MASS INDEX: 23.15 KG/M2 | WEIGHT: 152.78 LBS | HEART RATE: 78 BPM | DIASTOLIC BLOOD PRESSURE: 83 MMHG | SYSTOLIC BLOOD PRESSURE: 140 MMHG | RESPIRATION RATE: 18 BRPM

## 2020-08-24 PROBLEM — I63.9 CEREBELLAR STROKE (HCC): Status: ACTIVE | Noted: 2020-08-19

## 2020-08-24 LAB
BASOPHILS # BLD AUTO: 0.06 THOUSANDS/ΜL (ref 0–0.1)
BASOPHILS NFR BLD AUTO: 1 % (ref 0–1)
EOSINOPHIL # BLD AUTO: 0.15 THOUSAND/ΜL (ref 0–0.61)
EOSINOPHIL NFR BLD AUTO: 3 % (ref 0–6)
ERYTHROCYTE [DISTWIDTH] IN BLOOD BY AUTOMATED COUNT: 12.6 % (ref 11.6–15.1)
HCT VFR BLD AUTO: 45.2 % (ref 36.5–49.3)
HGB BLD-MCNC: 16.3 G/DL (ref 12–17)
IMM GRANULOCYTES # BLD AUTO: 0.02 THOUSAND/UL (ref 0–0.2)
IMM GRANULOCYTES NFR BLD AUTO: 0 % (ref 0–2)
INR PPP: 0.97 (ref 0.84–1.19)
LYMPHOCYTES # BLD AUTO: 1.73 THOUSANDS/ΜL (ref 0.6–4.47)
LYMPHOCYTES NFR BLD AUTO: 28 % (ref 14–44)
MCH RBC QN AUTO: 36.5 PG (ref 26.8–34.3)
MCHC RBC AUTO-ENTMCNC: 36.1 G/DL (ref 31.4–37.4)
MCV RBC AUTO: 101 FL (ref 82–98)
MONOCYTES # BLD AUTO: 0.8 THOUSAND/ΜL (ref 0.17–1.22)
MONOCYTES NFR BLD AUTO: 13 % (ref 4–12)
NEUTROPHILS # BLD AUTO: 3.33 THOUSANDS/ΜL (ref 1.85–7.62)
NEUTS SEG NFR BLD AUTO: 55 % (ref 43–75)
NRBC BLD AUTO-RTO: 0 /100 WBCS
PLATELET # BLD AUTO: 148 THOUSANDS/UL (ref 149–390)
PMV BLD AUTO: 10.9 FL (ref 8.9–12.7)
PROTHROMBIN TIME: 12.9 SECONDS (ref 11.6–14.5)
RBC # BLD AUTO: 4.47 MILLION/UL (ref 3.88–5.62)
WBC # BLD AUTO: 6.09 THOUSAND/UL (ref 4.31–10.16)

## 2020-08-24 PROCEDURE — 99239 HOSP IP/OBS DSCHRG MGMT >30: CPT | Performed by: INTERNAL MEDICINE

## 2020-08-24 PROCEDURE — 70450 CT HEAD/BRAIN W/O DYE: CPT

## 2020-08-24 PROCEDURE — 92507 TX SP LANG VOICE COMM INDIV: CPT

## 2020-08-24 PROCEDURE — 85025 COMPLETE CBC W/AUTO DIFF WBC: CPT | Performed by: NURSE PRACTITIONER

## 2020-08-24 PROCEDURE — 85610 PROTHROMBIN TIME: CPT | Performed by: NURSE PRACTITIONER

## 2020-08-24 PROCEDURE — 99232 SBSQ HOSP IP/OBS MODERATE 35: CPT | Performed by: PHYSICIAN ASSISTANT

## 2020-08-24 RX ORDER — ASPIRIN 81 MG/1
81 TABLET, CHEWABLE ORAL DAILY
Qty: 30 TABLET | Refills: 0 | Status: SHIPPED | OUTPATIENT
Start: 2020-08-25

## 2020-08-24 RX ORDER — LISINOPRIL 10 MG/1
10 TABLET ORAL DAILY
Qty: 30 TABLET | Refills: 0 | Status: SHIPPED | OUTPATIENT
Start: 2020-08-24

## 2020-08-24 RX ORDER — ASPIRIN 81 MG/1
81 TABLET, CHEWABLE ORAL DAILY
Status: DISCONTINUED | OUTPATIENT
Start: 2020-08-24 | End: 2020-08-24 | Stop reason: HOSPADM

## 2020-08-24 RX ORDER — ATORVASTATIN CALCIUM 20 MG/1
20 TABLET, FILM COATED ORAL
Qty: 30 TABLET | Refills: 0 | Status: SHIPPED | OUTPATIENT
Start: 2020-08-24

## 2020-08-24 RX ORDER — LEVOTHYROXINE SODIUM 0.07 MG/1
75 TABLET ORAL
Qty: 30 TABLET | Refills: 0 | Status: SHIPPED | OUTPATIENT
Start: 2020-08-24

## 2020-08-24 RX ORDER — ATORVASTATIN CALCIUM 20 MG/1
20 TABLET, FILM COATED ORAL
Status: DISCONTINUED | OUTPATIENT
Start: 2020-08-24 | End: 2020-08-24 | Stop reason: HOSPADM

## 2020-08-24 RX ORDER — ACETAMINOPHEN 325 MG/1
650 TABLET ORAL EVERY 6 HOURS PRN
Qty: 30 TABLET | Refills: 0
Start: 2020-08-24

## 2020-08-24 RX ADMIN — LISINOPRIL 10 MG: 10 TABLET ORAL at 08:05

## 2020-08-24 RX ADMIN — METOPROLOL TARTRATE 25 MG: 25 TABLET, FILM COATED ORAL at 08:05

## 2020-08-24 RX ADMIN — ASPIRIN 81 MG 81 MG: 81 TABLET ORAL at 12:56

## 2020-08-24 RX ADMIN — BENZONATATE 100 MG: 100 CAPSULE ORAL at 08:05

## 2020-08-24 RX ADMIN — LEVOTHYROXINE SODIUM 75 MCG: 75 TABLET ORAL at 05:01

## 2020-08-24 RX ADMIN — ATORVASTATIN CALCIUM 20 MG: 20 TABLET, FILM COATED ORAL at 13:49

## 2020-08-24 NOTE — UTILIZATION REVIEW
Continued Stay Review  Date: 2020                        Current Patient Class: ip  Current Level of Care: med surg  HPI:64 y o  male initially admitted on 2020 Inpatient due to R cerebellar hemorrhage w/ surrounding edema & mild mass effect on the 4th ventricle  Assessment/Plan:   2020 Attending: Kathleen ICU level   Right cerebellar hemorrhage: repeat CTH  PM, neuro checks, SBP goal<160, Cardene drip OFF now  Prn Hydralazine  Started PO ACEi  Starting Esmolol IV for HR>110  PO Metoprolol q 12hr; holding home Eliquis, NsGY , Neurology following  TTE,  analgesia  2020 Attending MD  Med surg level of care  Alert & oriented x4; + dysarthria, + RUE dysmetria & ataxia  Patient did not fail home Eliquis- he was noncompliant  BP: Per pt hasnt been taking any of prescribed meds>1yr  Started on PO ACEi & cont on previously ordered BB- now improving BPs- monitor for hypotension  2020 Neurology:  Ataxia right improving  Aspirin 81 mg daily can be started after CT head if otherwise stable  cont telemetry & normotensive BP control  Afib noncompliant with Eliquis, cont on BB, restart eliquis in 3 weeks from stroke  2020 Attending: The patient will continue to require additional inpatient hospital stay due to ongoing need for repeat ct in 2 days due to subtle increase  Holding home Eliquis; p;an to reinitiate at about 2 weeks per Neurology since pt was noncompliant with taking medication  Cerebellar hemorrhage- Inpatient due to R cerebellar hemorrhage w/ surrounding edema & mild mass effect on the 4th ventricle  Cont neuro checks, telemetry  Neurovascular consult suggested  2020 Neurosurgery: CTA head and neck w/wo, 2020: interval increase in right cerebellar infarct  5x3mm ACOMM aneurysm        Pertinent Labs/Diagnostic Results/Imagin2020CTA BRAIN - WITH AND WITHOUT CONTRAST= Minor interval increase in the hemorrhage related to right superior cerebellar arterial infarct   Stable Deformity of the 4th ventricle no indication of hydrocephalus  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   Neurovascular consultation suggested  8/20/2020 Ct head wo contrast=Grossly stable anterosuperior right cerebellum parenchymal hematoma and surrounding vasogenic edema  Unchanged regional mass effect and partial effacement of superior 4th ventricle with stable size of ventricular system   No hydrocephalus  8/20/2020 MRI BRAIN WITH AND WITHOUT CONTRAST=Right superior cerebellar artery hemorrhagic infarct   Local mass effect without hydrocephalus   Sluggish flow within the right vertebral is likely    Results from last 7 days   Lab Units 08/21/20  0515 08/20/20  0449 08/19/20  1158   WBC Thousand/uL 8 16 8 37 8 83   HEMOGLOBIN g/dL 17 2* 17 5* 18 0*   HEMATOCRIT % 48 6 48 5 52 0*   PLATELETS Thousands/uL 151 153 162   NEUTROS ABS Thousands/µL 5 67 5 77 6 54         Results from last 7 days   Lab Units 08/21/20  0511 08/20/20  0448 08/19/20  1158   SODIUM mmol/L 136 137 137   POTASSIUM mmol/L 4 4 3 3* 3 8   CHLORIDE mmol/L 105 100 99*   CO2 mmol/L 25 30 32   ANION GAP mmol/L 6 7 6   BUN mg/dL 15 13 16   CREATININE mg/dL 0 84 0 74 1 07   EGFR ml/min/1 73sq m 94 99 74   CALCIUM mg/dL 8 6 8 6 8 9   MAGNESIUM mg/dL 2 4 2 4  --      Results from last 7 days   Lab Units 08/19/20  1158   AST U/L 38   ALT U/L 46   ALK PHOS U/L 52   TOTAL PROTEIN g/dL 7 9   ALBUMIN g/dL 3 8   TOTAL BILIRUBIN mg/dL 1 12*   BILIRUBIN DIRECT mg/dL 0 32*         Results from last 7 days   Lab Units 08/21/20  0511 08/20/20  0448 08/19/20  1158   GLUCOSE RANDOM mg/dL 88 80 143*         Results from last 7 days   Lab Units 08/22/20  0445   HEMOGLOBIN A1C % 5 0   EAG mg/dl 97     No results found for: BETA-HYDROXYBUTYRATE                   Results from last 7 days   Lab Units 08/19/20  1446 08/19/20  1158   TROPONIN I ng/mL <0 02 <0 02         Results from last 7 days   Lab Units 08/19/20  1203   PROTIME seconds 13 2   INR  1 00   PTT seconds 28     Results from last 7 days   Lab Units 08/19/20  1158   TSH 3RD GENERATON uIU/mL 10 600*          Vital Signs:   08/22/20 15:17:34   98 4 °F (36 9 °C)   73   18   153/102Abnormal     119   99 %      Lying    08/22/20 0816      67      124/88                08/22/20 07:13:37   97 8 °F (36 6 °C)      18   124/88   100             Medications:   Scheduled Medications:  aspirin, 81 mg, Oral, Daily  atorvastatin, 20 mg, Oral, Daily With Dinner  levothyroxine, 75 mcg, Oral, Early Morning  lisinopril, 10 mg, Oral, Daily  metoprolol tartrate, 25 mg, Oral, Q12H MALGORZATA      Continuous IV Infusions:     PRN Meds:  acetaminophen, 650 mg, Oral, Q6H PRN  Labetalol HCl, 10 mg, Intravenous, Q6H PRN  ondansetron, 4 mg, Intravenous, Q6H PRN    Discharge Plan: The patient will continue to require additional inpatient hospital stay due to ongoing need for repeat ct in 2 days due to subtle increase   Network Utilization Review Department  [email or web address]  ATTENTION: Please call with any questions or concerns to [phone] and carefully listen to the prompts so that you are directed to the right person  All voicemails are confidential   Ankur Crump all requests for admission clinical reviews, approved or denied determinations and any other requests to dedicated fax number below belonging to the campus where the patient is receiving treatment   List of dedicated fax numbers for the Facilities:  FACILITY NAME UR FAX NUMBER   ADMISSION DENIALS (Administrative/Medical Necessity) [phone]   [address] [place] [address] (Maternity/NICU/Pediatrics) [phone]   Ahmet Medeiros [phone]   Cherylene Freud [phone]   [address] [place] [address] [phone]   [address] [facility] [address] [facility] [address] [facility] [phone]   [address] [phone]

## 2020-08-24 NOTE — RESTORATIVE TECHNICIAN NOTE
Restorative Specialist Mobility Note       Activity: Ambulate in gao, Ambulate in room, Bathroom privileges, Chair, Dangle, Stand at bedside(Educated/encouraged pt to ambulate with assistance 3-4 x's/day   Pt callbell, phone/tray within reach )     Assistive Device: None       Priscilla WILLIS, Restorative Technician, [employer] [facility]

## 2020-08-24 NOTE — TELEPHONE ENCOUNTER
Reviewed patient chart, discharge plan changed per inpatient therapy and inpatient CM  Per their documentation patient declined inpatient rehab and home healthcare services to opt for outpatient PT/OT while caring for his wife who has dementia  Plan per inpatient neurology is for repeat head CT today for determination regarding restarting antiplatelet agent  Called patient on hospital room phone with no answer  Unable to leave messages on hospital lines      I will continue to follow up during hospitalization as well as after discharge to assist

## 2020-08-24 NOTE — SPEECH THERAPY NOTE
SLP Progress Note    Patient Name: Bonny Maurice  Today's Date: 8/24/2020     Problem List  Principal Problem:    Cerebellar hemorrhage ([address] )  Active Problems:    Dizziness    A-fib (HCC)    HTN (hypertension)    Hypothyroid    Aneurysm (HCC)    Subjective:  "I know I need to talk slower "    Objective:  Pt was seen for diagnostic speech/swallowing therapy after pt walking in gao with restorative aide  Swallowing assessed to begin (pt with s/s mild thin liquid dysphagia during evaluation last week and was traiend to use "pre set"/controlled sips)  Pt took individual and successive sips with no s/s aspiration today  He denied s/s food dysphagia  Re: speech, pt admitted to s/s mild persistent dysarthria  Informal assessment revealed mild articulatory imprecision (1* for sibilants) with increase in imprecision with rapid rate  Trained pt in benefit of controlled or slowed rate  Pt read "bugaboo" (multisyllabic") words aloud with slowed rate and 95% precision  Used same words in sentences with only 70% use of slowed rate & increased artic imprecision (as pt tried to produce up to 5 sentences in a row rapidly)  Discussed cerebellar infarct with effect on rhythm and precision (no effect on cognition, language, humor)    Assessment:  Mild dysarthria persists  Training in strategies to maximize artic precision begun  Plan/Recommendations:  Continue outpatient therapies as indicated

## 2020-08-24 NOTE — PROGRESS NOTES
Progress Note - Neurology   Simon Flores  60 y o  male [id number]  Unit/Bed#: [facility] 713/[facility] 713-01    Assessment:  Simon Flores  is a 60 y o  male with atrial fibrillation on Eliquis (noncompliant with medications over the past year), HTN, hypothyroidism, tobacco and alcohol use who presented to [facility] on 8/19/2020 for dizziness, dysarthria, nausea, vomiting, and gait instability/ambulatory dysfunction  CT head/neck revealed R cerebellar hemorrhagic lesion  MRI brain revealed R superior cerebellar ischemic infarct with hemorrhagic conversion  CT head today revealed stable R cerebellar hemorrhagic infarct with unchanged mild mass effect on the 4th ventricle  Etiology for CVA likely secondary to cardioembolic source secondary to known atrial fibrillation and non-compliance with anticoagulation  Plan:  1) R cerebellar ischemic infarct with hemorrhagic conversion  - Repeat CT head today is stable  - Start aspirin 81 mg daily as bridge to Eliquis (restart Eliquis in approximately 3 weeks for known Atrial fibrillation)  - Start Lipitor 20 mg daily for stroke prevention (lower dose due to LDL 48)  - Normotensive, euglycemic goal  - PT/OT  - Continue to monitor and notify neurology with any changes  - Medical management and supportive care per primary team  Correction of any metabolic or infectious disturbances  - Outpatient follow-up with stroke clinic  Communication sent to the office   - No further inpatient Neuro recommendations  Patient is cleared for discharge from a neurology standpoint  2) Atrial fibrillation  - Non-compliant with Eliquis  - Eliquis currently on hold due to intracranial hemorrhage; plan to restart Elqius for A-fib in approximately 3 weeks  Will defer to outpatient stroke clinic    Aspirin bridge in the meanwhile      Results:  - LDL 48, cholesterol 120  - Hemoglobin A1C 5 0    - CTA head/neck 8/19/2020  · 3 1 x 2 0 cm hemorrhage with mild surrounding perifocal edema in the right cerebellum extending to involve the cerebellar vermis may be hypertensive hemorrhage  However consider MRI to exclude any underlying lesion/neoplasm  · No large vessel occlusion  · Patent vertebrobasilar system   · No significant carotid stenosis  · No hydrocephalus  - MRI brain  · Right superior cerebellar artery hemorrhagic infarct  Local mass effect without hydrocephalus  Sluggish flow within the right vertebral is likely  - CT head 8/24/2020:  · Stable appearance to the right cerebellar hemorrhagic infarction with unchanged mild mass effect on the 4th ventricle and the quadrigeminal cistern  No associated hydrocephalus  · Mild cerebral chronic microangiopathy   - Echo  · EF 55%, mildly dilated bilateral atria      Subjective:   Patient examined at bedside  Patient continues to have mild dysarthria and slight clumsiness of the right hand  However, states that he is able to sign his name better today compared to yesterday  He is also slightly off-balance while ambulating  Denies headache, visual disturbances, numbness, tingling, arm/leg weakness, chest pain, shortness of breath, and abdominal   Patient states that he will be compliant his medications upon discharge  Past Medical History:   Diagnosis Date    [facility]     Disease of thyroid gland     Hypertension      Past Surgical History:   Procedure Laterality Date    ROTATOR CUFF REPAIR       History reviewed  No pertinent family history    Social History     Socioeconomic History    Marital status: /Civil Union     Spouse name: None    Number of children: None    Years of education: None    Highest education level: None   Occupational History    None   Social Needs    Financial resource strain: None    Food insecurity     Worry: None     Inability: None    Transportation needs     Medical: None     Non-medical: None   Tobacco Use    Smoking status: Current Every Day Smoker     Packs/day: 0 50     Types: Cigarettes    Smokeless tobacco: Never Used   Substance and Sexual Activity    Alcohol use: Yes     Drinks per session: 3 or 4    Drug use: Not Currently    Sexual activity: None   Lifestyle    Physical activity     Days per week: None     Minutes per session: None    Stress: None   Relationships    Social connections     Talks on phone: None     Gets together: None     Attends Yazidi service: None     Active member of club or organization: None     Attends meetings of clubs or organizations: None     Relationship status: None    Intimate partner violence     Fear of current or ex partner: None     Emotionally abused: None     Physically abused: None     Forced sexual activity: None   Other Topics Concern    None   Social History Narrative    None     E-Cigarette/Vaping    E-Cigarette Use Never User      E-Cigarette/Vaping Substances         Medications: All current active meds have been reviewed and current meds:  Scheduled Meds:  Current Facility-Administered Medications   Medication Dose Route Frequency Provider Last Rate    acetaminophen  650 mg Oral Q6H PRN Mayela Gibbs MD      Labetalol HCl  10 mg Intravenous Q6H PRN Mayela Gibbs MD      levothyroxine  75 mcg Oral Early Morning ROSALINA Sims      lisinopril  10 mg Oral Daily Mayela Gibbs MD      metoprolol tartrate  25 mg Oral Q12H [address] Mayela Gibbs MD      ondansetron  4 mg Intravenous Q6H PRN Mayela Gibbs MD       Continuous Infusions:   PRN Meds:   acetaminophen    Labetalol HCl    ondansetron       ROS:   Review of Systems   Constitutional: Negative for diaphoresis, fatigue and fever  HENT: Negative for trouble swallowing and voice change  Eyes: Negative for photophobia and visual disturbance  Respiratory: Negative for chest tightness and shortness of breath  Cardiovascular: Negative for chest pain and palpitations  Gastrointestinal: Negative for abdominal pain     Genitourinary: Negative for difficulty urinating  Musculoskeletal: Positive for gait problem  Negative for back pain  Skin: Negative for color change  Neurological: Positive for speech difficulty  Negative for dizziness, tremors, facial asymmetry, weakness, light-headedness, numbness and headaches  Psychiatric/Behavioral: Negative for behavioral problems, confusion and decreased concentration  Vitals:   /86   Pulse 85   Temp 98 7 °F (37 1 °C)   Resp 18   Ht 5' 8" (1 727 m)   Wt 69 3 kg (152 lb 12 5 oz)   SpO2 98%   BMI 23 23 kg/m²     Physical Exam:   Physical Exam  Vitals signs and nursing note reviewed  Constitutional:       General: He is not in acute distress  Appearance: Normal appearance  Comments: Patient sitting comfortably in bed in no acute distress  HENT:      Head: Normocephalic and atraumatic  Nose: Nose normal       Mouth/Throat:      Mouth: Mucous membranes are moist    Eyes:      Extraocular Movements: Extraocular movements intact  Pupils: Pupils are equal, round, and reactive to light  Neck:      Musculoskeletal: Normal range of motion  Cardiovascular:      Rate and Rhythm: Normal rate  Pulmonary:      Effort: Pulmonary effort is normal    Abdominal:      Palpations: Abdomen is soft  Musculoskeletal: Normal range of motion  Skin:     General: Skin is warm and dry  Capillary Refill: Capillary refill takes less than 2 seconds  Neurological:      Mental Status: He is alert and oriented to person, place, and time  Psychiatric:         Mood and Affect: Mood normal        Neurologic Exam     Mental Status   Oriented to person, place, and time  Patient alert and oriented to person, place, city, month, and year  Mild dysarthria, but speech is still intelligible  No aphasia  Able to follow simple and cross over commands  Normal attention and concentration  Good insight       Cranial Nerves     CN III, IV, VI   Pupils are equal, round, and reactive to light  Pupils 3 mm round, reactive to light bilaterally  EOMs intact without nystagmus  No visual field deficits  Facial sensation to light touch intact throughout  Facial expressions full and symmetric  Tongue midline without fasciculations  Palate raises symmetrically  Full strength in sternocleidomastoid and trapezius muscles  Motor Exam   Muscle bulk: normal  Overall muscle tone: normal  Right arm pronator drift: absent  Left arm pronator drift: absent  5/5 strength bilateral upper and lower extremities  Sensory Exam   Sensation to light touch, temperature, and vibration intact throughout  Gait, Coordination, and Reflexes   Slightly unsteady gait, occasionally stumbling over right foot  Slight dysmetria with right finger-to-nose  Normal left finger-to-nose  Slight ataxia with right heel-to-shin  Normal left heel-to-shin  No resting or action tremor  Labs: I have personally reviewed pertinent reports     Recent Results (from the past 24 hour(s))   CBC and differential    Collection Time: 08/24/20  4:53 AM   Result Value Ref Range    WBC 6 09 4 31 - 10 16 Thousand/uL    RBC 4 47 3 88 - 5 62 Million/uL    Hemoglobin 16 3 12 0 - 17 0 g/dL    Hematocrit 45 2 36 5 - 49 3 %     (H) 82 - 98 fL    MCH 36 5 (H) 26 8 - 34 3 pg    MCHC 36 1 31 4 - 37 4 g/dL    RDW 12 6 11 6 - 15 1 %    MPV 10 9 8 9 - 12 7 fL    Platelets 693 (L) 234 - 390 Thousands/uL    nRBC 0 /100 WBCs    Neutrophils Relative 55 43 - 75 %    Immat GRANS % 0 0 - 2 %    Lymphocytes Relative 28 14 - 44 %    Monocytes Relative 13 (H) 4 - 12 %    Eosinophils Relative 3 0 - 6 %    Basophils Relative 1 0 - 1 %    Neutrophils Absolute 3 33 1 85 - 7 62 Thousands/µL    Immature Grans Absolute 0 02 0 00 - 0 20 Thousand/uL    Lymphocytes Absolute 1 73 0 60 - 4 47 Thousands/µL    Monocytes Absolute 0 80 0 17 - 1 22 Thousand/µL    Eosinophils Absolute 0 15 0 00 - 0 61 Thousand/µL    Basophils Absolute 0 06 0 00 - 0 10 Thousands/µL   Protime-INR    Collection Time: 08/24/20  4:53 AM   Result Value Ref Range    Protime 12 9 11 6 - 14 5 seconds    INR 0 97 0 84 - 1 19       Imaging: I have personally reviewed pertinent imaging in PACS, including CTA head/neck, MRI brain, repeat CT head, Echo,  and I have personally reviewed PACS reports  EKG, Pathology, and Other Studies: I have personally reviewed pertinent reports  VTE Prophylaxis: Sequential compression device (Venodyne)       Counseling / Coordination of Care  Total time spent today 20 minutes  Greater than 50% of total time was spent with the patient and/or family counseling and/or coordination of care  A description of the counseling/coordination of care:  Patient was seen and evaluated  Discussed with attending  Chart reviewed thoroughly including laboratory and imaging studies    Plan of care discussed with patient and primary team

## 2020-08-25 ENCOUNTER — TELEPHONE (OUTPATIENT)
Dept: NEUROLOGY | Facility: CLINIC | Age: 65
End: 2020-08-25

## 2020-08-25 ENCOUNTER — TELEPHONE (OUTPATIENT)
Dept: NEUROSURGERY | Facility: CLINIC | Age: 65
End: 2020-08-25

## 2020-08-25 NOTE — UTILIZATION REVIEW
Notification of Discharge  This is a Notification of Discharge from our facility [address]  Please be advised that this patient has been discharge from our facility  Below you will find the admission and discharge date and time including the patients disposition  PRESENTATION DATE: 8/19/2020 11:37 AM  OBS ADMISSION DATE:   IP ADMISSION DATE: 8/19/20 1515   DISCHARGE DATE: 8/24/2020  2:41 PM  DISPOSITION: Home/Self Care Home/Self Care   Admission Orders listed below:  Admission Orders (From admission, onward)     Ordered        08/19/20 1514  Inpatient Admission  Once                   Please contact the UR Department if additional information is required to close this patient's authorization/case  [address] Utilization Review Department  Main: [phone] x carefully listen to the prompts  All voicemails are confidential   [email or web address]  Send all requests for admission clinical reviews, approved or denied determinations and any other requests to dedicated fax number below belonging to the campus where the patient is receiving treatment   List of dedicated fax numbers:  [address] DENIALS (Administrative/Medical Necessity) [phone]   [address] (Maternity/NICU/Pediatrics) [phone]   Valentin Douglass [phone]   Xiomara Langford [phone]   [address] [place] [address] [phone]   Gerri Nyhan [facility] [address] [place] [address] [phone]   Mary Ann Trent [phone]   [address] [facility] [address] [facility]  [address] [address] [address] [facility] [phone]

## 2020-08-25 NOTE — ASSESSMENT & PLAN NOTE
· Repeat CT head done today showed stable changes  · Discussed with neurology - okay to begin ASA 81 mg daily and discharge today  · Atorvastatin begun at a dose of 20 mg as his LDL is 48  · Take anticoagulation will be begun by Neurology as an outpatient in few weeks  · Discussed with neurology - input appreciated

## 2020-08-25 NOTE — ASSESSMENT & PLAN NOTE
· Has been noncompliant with medication  · Continue metoprolol, ASA  · Neurology will let him know when anticoagulation can be restarted as an outpatient

## 2020-08-25 NOTE — TELEPHONE ENCOUNTER
8/25/2020 - Called [facility] for patient to advise of follow up appointment on 9/23/2020 at 1pm    8/25/2020 - Patient D/C home    Can you please schedule hospital follow up in 2-4 weeks with Dr Lin Guzmán for incidental finding of ACOM aneurysm? Can be SNPX, no imaging  Thanks!

## 2020-08-25 NOTE — TELEPHONE ENCOUNTER
----- Message from Rod Aceves PA-C sent at 8/24/2020 12:07 PM EDT -----  Regarding: HFU  Diagnosis/Reason for follow-up: R cerebellar ischemic infarct with hemorrhagic conversion  Subspecialty for follow-up: stroke clinic  Recommended timing for HFU: within 3-5 weeks  Existing neurologist: none    Additional notes: Will start aspirin today due to stable CT head  Recommend restarting Eliquis for A-fib in 3 weeks    Thank you!

## 2020-08-25 NOTE — DISCHARGE SUMMARY
Discharge Summary - [facility] Internal Medicine    Patient Information: Chuck Schmitt  60 y o  male MRN: [id number]  Unit/Bed#: [facility] 183-24 Encounter: [id number]    Discharging Physician / Practitioner: Freddy Ji MD  PCP: Meghann Kelley MD  Admission Date: 8/19/2020  Discharge Date: 8/24/2020      Principal discharge diagnosis:  Right cerebellar ischemic infarct with hemorrhagic conversion    Secondary diagnoses:  1  Atrial fibrillation  2  Hypertension  3  Hyperlipidemia  4  Hypothyroidism    Consultations During Hospital Stay:  Neurology  Neurosurgery    Procedures Performed:   1  Chest x-ray - no acute cardiopulmonary disease  2  CTA head and neck with and without contrast(8/19/20) - 3 1 x 2 0 cm hemorrhage with mild surrounding perifocal edema in the right cerebellum extending to involve the cerebellar vermis may be hypertensive hemorrhage  No large vessel occlusion  Patent vertebrobasilar system  No significant carotid stenosis  No hydrocephalus  3  MRI brain with and without contrast - Right superior cerebellar artery hemorrhagic infarct  Local mass effect without hydrocephalus  Sluggish flow within the right vertebral is likely  4  CT head with and without contrast(8/22/20) - Minor interval increase in the hemorrhage related to right superior cerebellar arterial infarct  Stable Deformity of the 4th ventricle no indication of hydrocephalus    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  Neurovascular consultation suggested  5  CT head without contrast - Stable appearance to the right cerebellar hemorrhagic infarction with unchanged mild mass effect on the 4th ventricle and the quadrigeminal cistern  No associated hydrocephalus  Mild cerebral chronic microangiopathy  6  Echocardiogram -   LEFT VENTRICLE:  Systolic function was normal  Ejection fraction was estimated to be 55 %    Although no diagnostic regional wall motion abnormality was identified, this possibility cannot be completely excluded on the basis of this study  Wall thickness was mildly increased    LEFT ATRIUM:  The atrium was mildly dilated    RIGHT ATRIUM:  The atrium was mildly dilated    MITRAL VALVE:  There was mild annular calcification  There was trace regurgitation    AORTIC VALVE:  There was trace regurgitation    TRICUSPID VALVE:  There was trace regurgitation  Hospital Course:     Vincenzo Tomas  is a 60 y o  male patient with a history of hypertension, atrial fibrillation noncompliant with medications who originally presented to the hospital on 8/19/2020 due to dizziness, unsteady gait, dysarthria, vomiting  He was found to have a right cerebellar ischemic infarct with hemorrhagic conversion  His CT head done on 08/24/2020 was stable and he was cleared by Neurology to begin aspirin 81 mg daily which he received before leaving the hospital   Since his LDL is 48 he was begun on Lipitor 20 mg daily for stroke prevention  He has been noncompliant with Eliquis previously but he is agreeable to be compliant now  Neurology plan to restart Eliquis in approximately 3 weeks  He had been noncompliant with his levothyroxine and was noted to have a high TSH and low free T4  He was started on levothyroxine 75 mcg daily and has been advised to check thyroid function tests in 6 weeks  CTA on 08/22 showed a 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 and he will follow-up with neurosurgery as an outpatient    Condition at Discharge: stable     Discharge Day Visit / Exam:     * Please refer to separate progress for these details *    Discharge instructions/Information to patient and family:   See after visit summary for information provided to patient and family  Provisions for Follow-Up Care:  See after visit summary for information related to follow-up care and any pertinent home health orders        Disposition: Home    Planned Readmission:  No    Discharge Statement:  I spent 40 minutes discharging the patient  This time was spent on the day of discharge  I had direct contact with the patient on the day of discharge  Greater than 50% of the total time was spent examining patient, answering all patient questions, arranging and discussing plan of care with patient as well as directly providing post-discharge instructions  Additional time then spent on discharge activities  Discharge Medications:  See after visit summary for reconciled discharge medications provided to patient and family

## 2020-08-25 NOTE — PROGRESS NOTES
Progress Note - Neha Leonard  1955, 60 y o  male MRN: [id number]    Unit/Bed#: [facility] 713-01 Encounter: [id number]    Primary Care Provider: Cheryl Lr MD   Date and time admitted to hospital: 2020 11:37 AM        * Cerebellar stroke with hemorrhagic conversion  Assessment & Plan  · Repeat CT head done today showed stable changes  · Discussed with neurology - okay to begin ASA 81 mg daily and discharge today  · Atorvastatin begun at a dose of 20 mg as his LDL is 48  · Take anticoagulation will be begun by Neurology as an outpatient in few weeks  · Discussed with neurology - input appreciated    A-fib ([address] )  Assessment & Plan  · Has been noncompliant with medication  · Continue metoprolol, ASA  · Neurology will let him know when anticoagulation can be restarted as an outpatient    Hypothyroid  Assessment & Plan  · tsh 10 600  · Free t4 0 61  · Pt reports was previously on synthroid , past records over 6 months ago pt was on 125mcg which pt states he was never taking   · Started this am on 75mcg po daily  · Repeat labs in 4 - 6 weeks     HTN (hypertension)  Assessment & Plan  · Controlled  · Continue current regimen        Patient Centered Rounds: Discussed with RN    Discussions with Specialists or Other Care Team Provider:  Discussed with neurology    Education and Discussions with Family / Patient:  Discussed with patient  Discussed with ex-wife on the phone  Called daughter - left a message    Time Spent for Care: 20 minutes  More than 50% of total time spent on counseling and coordination of care as described above  Current Length of Stay: 5 day(s)    Current Patient Status: Inpatient     Discharge Plan:  Discharge home today    Subjective:   No new events    CT head done today showed no change    Objective:     Vitals:   Temp (24hrs), Av 7 °F (37 1 °C), Min:98 6 °F (37 °C), Max:98 7 °F (37 1 °C)    Temp:  [98 6 °F (37 °C)-98 7 °F (37 1 °C)] 98 6 °F (37 °C)  HR:  [78-85] 78  Resp:  [18] 18  BP: (140-142)/(83-86) 140/83  SpO2:  [97 %-98 %] 97 %  Body mass index is 23 23 kg/m²  Physical Exam:     Physical Exam  Vitals signs and nursing note reviewed  HENT:      Head: Normocephalic and atraumatic  Nose: Nose normal    Eyes:      Pupils: Pupils are equal, round, and reactive to light  Neck:      Musculoskeletal: Neck supple  Cardiovascular:      Rate and Rhythm: Regular rhythm  Pulmonary:      Effort: Pulmonary effort is normal       Breath sounds: Normal breath sounds  Abdominal:      General: Bowel sounds are normal       Palpations: Abdomen is soft  Tenderness: There is no abdominal tenderness  Skin:     General: Skin is warm and dry  Neurological:      Mental Status: He is alert and oriented to person, place, and time  Psychiatric:         Mood and Affect: Mood normal          Behavior: Behavior normal          Additional Data:     Labs:    Results from last 7 days   Lab Units 08/24/20  0453   WBC Thousand/uL 6 09   HEMOGLOBIN g/dL 16 3   HEMATOCRIT % 45 2   PLATELETS Thousands/uL 148*   NEUTROS PCT % 55   LYMPHS PCT % 28   MONOS PCT % 13*   EOS PCT % 3     Results from last 7 days   Lab Units 08/21/20  0511  08/19/20  1158   SODIUM mmol/L 136   < > 137   POTASSIUM mmol/L 4 4   < > 3 8   CHLORIDE mmol/L 105   < > 99*   CO2 mmol/L 25   < > 32   BUN mg/dL 15   < > 16   CREATININE mg/dL 0 84   < > 1 07   ANION GAP mmol/L 6   < > 6   CALCIUM mg/dL 8 6   < > 8 9   ALBUMIN g/dL  --   --  3 8   TOTAL BILIRUBIN mg/dL  --   --  1 12*   ALK PHOS U/L  --   --  52   ALT U/L  --   --  46   AST U/L  --   --  38   GLUCOSE RANDOM mg/dL 88   < > 143*    < > = values in this interval not displayed  Results from last 7 days   Lab Units 08/24/20  0453   INR  0 97         Results from last 7 days   Lab Units 08/22/20  0445   HEMOGLOBIN A1C % 5 0               * I Have Reviewed All Lab Data Listed Above  * Additional Pertinent Lab Tests Reviewed:  [facility]    Today, Patient Was Seen By: Warren Bejarano MD    ** Please Note: Dictation voice to text software may have been used in the creation of this document   **

## 2020-08-28 NOTE — TELEPHONE ENCOUNTER
Post CVA Discharge Follow Up    Hospitalization: 8/19-8/24  The purpose of this phone call is to assess patient's general wellbeing or for any assistance needed with follow-up care  Called patient at [phone]  I introduced myself and explained neurovascular nurse navigator role  Since discharge, he denies experiencing any new or worsening stroke-like symptoms  Patient reports he continues to have the following symptoms: balance disturbance, decreased coordination and fine motor skills of the right hand (reports he cannot play his guitar as he used to), and he does still have slight slurred speech over the phone (he states speech slurs more if he speaks too fast)  He claims symptoms are indeed improving since discharge  Ambulation / ADLs:  Patient claims he is ambulating independently as well as preforming his own ADLs  Patient manages his own medications, appointments, and affairs  Appointments / Medication Review:  Patient successfully followed up with PCP yesterday 8/27, (out of network provider)  Offered to schedule stroke hospital follow up appointment 10/1, patient is agreeable to this and he states he wants the earliest appointment available  I scheduled appointment for 8 am, verified mailing address, then mailed new patient packet to home address on file  Patient also scheduled with outpatient PT 8/31 and neurosurgery 9/23  He notes he plans to make his appointments with outpatient OT and ST when he goes to the office on Monday  I reviewed medications with him  There have not been any medication changes since discharge from the hospital  Patient reports having no difficulties obtaining medications  Reports he is taking all as prescribed with no missed doses, medication side effects, or signs of bleeding      Risk Factors / Education:  Patient verbalizes competency in stroke type, symptoms, personal risk factors and management, and medications  We reviewed additional resources as well for lifestyle modification and he notes he has been managing his stroke risk factors in the following ways: he reports he has completed abstained from smoking and is following a structured diet that is low in salt and cholesterol  He notes he does not monitor his BP at home and we discussed this as well  He states he might start as "this was a wake up call"  I addressed all his questions  At the conclusion of the conversation, patient denies having any further questions or concerns

## 2020-08-31 ENCOUNTER — EVALUATION (OUTPATIENT)
Dept: PHYSICAL THERAPY | Facility: REHABILITATION | Age: 65
End: 2020-08-31
Payer: COMMERCIAL

## 2020-08-31 DIAGNOSIS — I63.9 CEREBELLAR STROKE (HCC): ICD-10-CM

## 2020-08-31 PROCEDURE — 97110 THERAPEUTIC EXERCISES: CPT

## 2020-08-31 PROCEDURE — 97161 PT EVAL LOW COMPLEX 20 MIN: CPT

## 2020-08-31 NOTE — LETTER
2020    Sam Councilman, MD  [address] [address]    Patient: Jasmyne Salgado  YOB: 1955   Date of Visit: 2020     Encounter Diagnosis     ICD-10-CM    1  Cerebellar stroke with hemorrhagic conversion  I63 9 Ambulatory referral to Physical Therapy       Dear Dr Christina Johnson: Thank you for your recent referral of Jasmyne Salgado    Please review the attached evaluation summary from Jose Ramon's recent visit  Please verify that you agree with the plan of care by signing the attached order  If you have any questions or concerns, please do not hesitate to call  I sincerely appreciate the opportunity to share in the care of one of your patients and hope to have another opportunity to work with you in the near future  Sincerely,    Sixto Marlow PT      Referring Provider:      I certify that I have read the below Plan of Care and certify the need for these services furnished under this plan of treatment while under my care  Sam Councilman, MD  [address]: [phone]          PHYSICAL THERAPY EVALUATION and Discharge    Today's date: 2020  Patient name: Jasmyne Salgado  : 1955  Referring provider: Paola Ozuna MD    SUBJECTIVE:  HPI: Jasmyne Salgado  is a 60 y o  male referred to outpatient physical therapy for the following diagnosis   Encounter Diagnosis   Name Primary?  Cerebellar stroke with hemorrhagic conversion           Patient reports: He had a stroke and was in the hospital for a few days  Confirmed hospital timeline (below)  He has an appt with Neurologist on    He also followed up with his PCP already  He has been feeling pretty good, he has more energy  He is not reporting issues with walking and mobility  The most deficits he is having is in his (R) arm and hand    He broke his finger in the past    He did not have therapy for this in the past  He is also slurs his words  Gait/mobility: Denies fall  He notices a small amount of difficulty with endurance, but he has been walking every day  No other issues  Imaging: Yes (bolded below from hospital course)     Per Hospital Course Note in Epic:    Chuck Schmitt  is a 60 y o  male patient with a history of hypertension, atrial fibrillation noncompliant with medications who originally presented to the hospital on 8/19/2020 due to dizziness, unsteady gait, dysarthria, vomiting  He was found to have a right cerebellar ischemic infarct with hemorrhagic conversion  His CT head done on 08/24/2020 was stable and he was cleared by Neurology to begin aspirin 81 mg daily which he received before leaving the hospital   Since his LDL is 48 he was begun on Lipitor 20 mg daily for stroke prevention  He has been noncompliant with Eliquis previously but he is agreeable to be compliant now  Neurology plan to restart Eliquis in approximately 3 weeks  He had been noncompliant with his levothyroxine and was noted to have a high TSH and low free T4  He was started on levothyroxine 75 mcg daily and has been advised to check thyroid function tests in 6 weeks  CTA on 08/22 showed a 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 and he will follow-up with neurosurgery as an outpatient    Procedures Performed:   1  Chest x-ray - no acute cardiopulmonary disease  2  CTA head and neck with and without contrast(8/19/20) - 3 1 x 2 0 cm hemorrhage with mild surrounding perifocal edema in the right cerebellum extending to involve the cerebellar vermis may be hypertensive hemorrhage  No large vessel occlusion  Patent vertebrobasilar system  No significant carotid stenosis  No hydrocephalus  3  MRI brain with and without contrast - Right superior cerebellar artery hemorrhagic infarct   Local mass effect without hydrocephalus   Sluggish flow within the right vertebral is likely    4  CT head with and without contrast(8/22/20) - Minor interval increase in the hemorrhage related to right superior cerebellar arterial infarct   Stable Deformity of the 4th ventricle no indication of hydrocephalus    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   Neurovascular consultation suggested  5  CT head without contrast - Stable appearance to the right cerebellar hemorrhagic infarction with unchanged mild mass effect on the 4th ventricle and the quadrigeminal cistern   No associated hydrocephalus  Mild cerebral chronic microangiopathy  6  Echocardiogram -   LEFT VENTRICLE:  Systolic function was normal  Ejection fraction was estimated to be 55 %  Although no diagnostic regional wall motion abnormality was identified, this possibility cannot be completely excluded on the basis of this study  Wall thickness was mildly increased    LEFT ATRIUM:  The atrium was mildly dilated    RIGHT ATRIUM:  The atrium was mildly dilated    MITRAL VALVE:  There was mild annular calcification  There was trace regurgitation    AORTIC VALVE:  There was trace regurgitation    TRICUSPID VALVE:  There was trace regurgitation      PLOF: independent with ADLs    Home environment: lives with cousin right now  Employment status: Yes works at [employer], also - sings and plays the [employer]   Exercise history: very active at home, constantly walking around the house/up and down stairs, lifting equipment; no regular physical exercise routine     Pain reported: none, denies headaches or dizziness     Patient goals: to play [employer] again     Past Medical History:   Diagnosis Date    A-fib ([facility] )     Disease of thyroid gland     Hypertension        Current Outpatient Medications:     acetaminophen (TYLENOL) 325 mg tablet, Take 2 tablets (650 mg total) by mouth every 6 (six) hours as needed for mild pain or headaches, Disp: 30 tablet, Rfl: 0    aspirin 81 mg chewable tablet, Chew 1 tablet (81 mg total) daily, Disp: 30 tablet, Rfl: 0   atorvastatin (LIPITOR) 20 mg tablet, Take 1 tablet (20 mg total) by mouth daily with dinner, Disp: 30 tablet, Rfl: 0    levothyroxine 75 mcg tablet, Take 1 tablet (75 mcg total) by mouth daily in the early morning, Disp: 30 tablet, Rfl: 0    lisinopril (ZESTRIL) 10 mg tablet, Take 1 tablet (10 mg total) by mouth daily, Disp: 30 tablet, Rfl: 0    metoprolol tartrate (LOPRESSOR) 25 mg tablet, Take 1 tablet (25 mg total) by mouth every 12 (twelve) hours, Disp: 60 tablet, Rfl: 0      OBJECTIVE:    Vital Signs  8/31/2020   Blood Pressure 174/104 mmHg   Heart Rate  69 bpm     Coordination Left Right   Heel To Chamberlain WNL WNL   Finger To Nose WNL WNL   Rapid Alternating Movement WNL WNL           Muscle Tone-Modified Brit Scale Right Left   Hamstrings 0 0   Quadriceps 0 0   Hip adductors 0 0   Gastrocnemius 0 0     0 No increase in tone    1 Slight increase in muscle tone, manifested by a catch and release or minimal resistance at the end of the ROM when the affected part(s) is moved in flexion or extension    1+ Slight increase in muscle tone, manifested by a catch, followed by minimal resistance throughout the remainder (less than half) of the ROM   2 More marked increase in muscle tone through most of the ROM, but affected part(s) easily moved    3 Considerable increase in muscle tone, passive movement difficult   4 Affected part(s) rigid in flexion or extension       Manual Muscle Testing - Hip Left Right   Flexion 5 5   Extension 4+ 4+   Abduction 4+ 4+     Manual Muscle Testing - Knee Left Right   Flexion 5 5   Extension 5 5     Manual Muscle Testing - Ankle Left Right   Dorsiflexion 5 5   Plantarflexion 5 5     UE strength  Right Left    Shoulder flexion 5 5   Shoulder extension 5 5   Shoulder abduction 4+ 5   Elbow flex 5 5   Elbow ext 5 5   Wrist flex  5 5   Wrist ext  5 5     Transfers    Sit To Stand Independent   Sit To Supine Independent   Roll Independent     Balance Outcome Measures     6 Minute Walk Test (ft): 1480 ft   Vitals post: 196/100 mmHg   Spo: 98%   2 Minute Walk Test (ft): Not tested    Gait Speed (ft/s): 3 59 ft/sec; 1 10 m/s    5x Sit To Stand (s): 6 77 seconds    TUG (s):    FGA (below) 29/30      Functional Gait Assessment   3/3 Gait level surface  3/3 Change in gait speed  3/3 Gait with horizontal head turns  3/3 Gait with vertical head turns  3/3 Gait and pivot turn  3/3 Step over obstacle  3/3 Gait with narrow base of support  2/3 Gait with eyes closed  3/3 Ambulating backwards  3/3 Steps  29/30 Total score (less than 22/30 indicates increased risk of fall)       Static Balance  MCTSIB Number of Seconds   Feet Together, Eyes Open 30   Feet Together, Eyes Closed 30   Feet Together, Eyes Open Foam 30   Feet Together, Eyes Closed Foam 30     Gait Assessment: Demonstrates good heel strike, hip extension, push off, arm swing bilaterally  No ataxic gait pattern observed  AD: none        Precautions: HTN, a-fib  Manuals                              Neuro Re-Ed                                                Ther Ex      Heel raises 10x      Squats 10x      Sit to stands  10x                                    Ther Activity                  Gait Training                  HEP Heel raises  STS  Squats  Walking 3-5 days per week   Performing regular physical activity routine  While monitoring for any symptoms                       ASSESSMENT:  Patient presents to physical therapy with diagnosis of cerebellar stroke with hemorrhagic conversion  Patient presents today with decreased (R) UE coordination with wrist and finger movements, sensation intact, and good functional strength and balance per MMT and functional outcome measures (5x sit to stand, FGA, and 6 MWT)  Patient with no deficits into the LEs, with good coordination during ambulation and stair negotiation  Patient is not at an increased risk for falls and scored very high on the FGA     Reviewed LE strengthening exercises for continued strengthening at home as well as discussed importance of normal physical activity  At this time, patient is functioning at his PLOF prior to the cerebellar stroke  He will benefit from a skilled OT evaluation to address wrist/hand motor control and coordination  Patient also with history of HTN and non compliance with regular medication  High BP readings at start of session with normal increase with physical activity  Discussed importance of regularly monitoring BP and taking medications at home  Due to patient's performance, he will be not benefit from skilled PT at this time and verbalizes understanding with medication management and regular exercise at home  Further referral needed: Yes - Occupational therapy for hand/wrist coordination and fine motor skills; Speech therapy Evaluation      PLAN OF CARE: Patient will not benefit from skilled PT at this time  Recommend follow up with PT at [place] [id number] for any concerns in the future  Patient scheduled for OT evaluation on Thursday 9/3/2020    Plan of care start date: 8/31/2020  Plan of care end date: 8/31/2020  Discussed with: patient        Krystin Luong, PT  8/31/2020

## 2020-08-31 NOTE — PROGRESS NOTES
PHYSICAL THERAPY EVALUATION and Discharge    Today's date: 2020  Patient name: Oneil Randhawa  : 1955  Referring provider: Baldemar Medina MD    SUBJECTIVE:  HPI: Oneil Randhawa  is a 60 y o  male referred to outpatient physical therapy for the following diagnosis   Encounter Diagnosis   Name Primary?  Cerebellar stroke with hemorrhagic conversion           Patient reports: He had a stroke and was in the hospital for a few days  Confirmed hospital timeline (below)  He has an appt with Neurologist on    He also followed up with his PCP already  He has been feeling pretty good, he has more energy  He is not reporting issues with walking and mobility  The most deficits he is having is in his (R) arm and hand  He broke his finger in the past    He did not have therapy for this in the past  He is also slurs his words  Gait/mobility: Denies fall  He notices a small amount of difficulty with endurance, but he has been walking every day  No other issues  Imaging: Yes (bolded below from hospital course)     Per Hospital Course Note in Epic:    Oneil Randhawa  is a 60 y o  male patient with a history of hypertension, atrial fibrillation noncompliant with medications who originally presented to the hospital on 2020 due to dizziness, unsteady gait, dysarthria, vomiting  He was found to have a right cerebellar ischemic infarct with hemorrhagic conversion  His CT head done on 2020 was stable and he was cleared by Neurology to begin aspirin 81 mg daily which he received before leaving the hospital   Since his LDL is 48 he was begun on Lipitor 20 mg daily for stroke prevention  He has been noncompliant with Eliquis previously but he is agreeable to be compliant now  Neurology plan to restart Eliquis in approximately 3 weeks  He had been noncompliant with his levothyroxine and was noted to have a high TSH and low free T4    He was started on levothyroxine 75 mcg daily and has been advised to check thyroid function tests in 6 weeks  CTA on 08/22 showed a 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 and he will follow-up with neurosurgery as an outpatient    Procedures Performed:   1  Chest x-ray - no acute cardiopulmonary disease  2  CTA head and neck with and without contrast(8/19/20) - 3 1 x 2 0 cm hemorrhage with mild surrounding perifocal edema in the right cerebellum extending to involve the cerebellar vermis may be hypertensive hemorrhage  No large vessel occlusion  Patent vertebrobasilar system  No significant carotid stenosis  No hydrocephalus  3  MRI brain with and without contrast - Right superior cerebellar artery hemorrhagic infarct   Local mass effect without hydrocephalus   Sluggish flow within the right vertebral is likely  4  CT head with and without contrast(8/22/20) - Minor interval increase in the hemorrhage related to right superior cerebellar arterial infarct   Stable Deformity of the 4th ventricle no indication of hydrocephalus    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   Neurovascular consultation suggested  5  CT head without contrast - Stable appearance to the right cerebellar hemorrhagic infarction with unchanged mild mass effect on the 4th ventricle and the quadrigeminal cistern   No associated hydrocephalus  Mild cerebral chronic microangiopathy  6  Echocardiogram -   LEFT VENTRICLE:  Systolic function was normal  Ejection fraction was estimated to be 55 %  Although no diagnostic regional wall motion abnormality was identified, this possibility cannot be completely excluded on the basis of this study  Wall thickness was mildly increased    LEFT ATRIUM:  The atrium was mildly dilated    RIGHT ATRIUM:  The atrium was mildly dilated    MITRAL VALVE:  There was mild annular calcification    There was trace regurgitation    AORTIC VALVE:  There was trace regurgitation    TRICUSPID VALVE:  There was trace regurgitation      PLOF: independent with ADLs    Home environment: lives with cousin right now  Employment status: Yes works at [employer], also - sings and plays the [employer]   Exercise history: very active at home, constantly walking around the house/up and down stairs, lifting equipment; no regular physical exercise routine     Pain reported: none, denies headaches or dizziness     Patient goals: to play [employer] again     Past Medical History:   Diagnosis Date    [facility]fib ([facility] )     Disease of thyroid gland     Hypertension        Current Outpatient Medications:     acetaminophen (TYLENOL) 325 mg tablet, Take 2 tablets (650 mg total) by mouth every 6 (six) hours as needed for mild pain or headaches, Disp: 30 tablet, Rfl: 0    aspirin 81 mg chewable tablet, Chew 1 tablet (81 mg total) daily, Disp: 30 tablet, Rfl: 0    atorvastatin (LIPITOR) 20 mg tablet, Take 1 tablet (20 mg total) by mouth daily with dinner, Disp: 30 tablet, Rfl: 0    levothyroxine 75 mcg tablet, Take 1 tablet (75 mcg total) by mouth daily in the early morning, Disp: 30 tablet, Rfl: 0    lisinopril (ZESTRIL) 10 mg tablet, Take 1 tablet (10 mg total) by mouth daily, Disp: 30 tablet, Rfl: 0    metoprolol tartrate (LOPRESSOR) 25 mg tablet, Take 1 tablet (25 mg total) by mouth every 12 (twelve) hours, Disp: 60 tablet, Rfl: 0      OBJECTIVE:    Vital Signs  8/31/2020   Blood Pressure 174/104 mmHg   Heart Rate  69 bpm     Coordination Left Right   Heel To Chamberlain WNL WNL   Finger To Nose WNL WNL   Rapid Alternating Movement WNL WNL           Muscle Tone-Modified Brit Scale Right Left   Hamstrings 0 0   Quadriceps 0 0   Hip adductors 0 0   Gastrocnemius 0 0     0 No increase in tone    1 Slight increase in muscle tone, manifested by a catch and release or minimal resistance at the end of the ROM when the affected part(s) is moved in flexion or extension    1+ Slight increase in muscle tone, manifested by a catch, followed by minimal resistance throughout the remainder (less than half) of the ROM   2 More marked increase in muscle tone through most of the ROM, but affected part(s) easily moved    3 Considerable increase in muscle tone, passive movement difficult   4 Affected part(s) rigid in flexion or extension       Manual Muscle Testing - Hip Left Right   Flexion 5 5   Extension 4+ 4+   Abduction 4+ 4+     Manual Muscle Testing - Knee Left Right   Flexion 5 5   Extension 5 5     Manual Muscle Testing - Ankle Left Right   Dorsiflexion 5 5   Plantarflexion 5 5     UE strength  Right Left    Shoulder flexion 5 5   Shoulder extension 5 5   Shoulder abduction 4+ 5   Elbow flex 5 5   Elbow ext 5 5   Wrist flex  5 5   Wrist ext  5 5     Transfers    Sit To Stand Independent   Sit To Supine Independent   Roll Independent     Balance Outcome Measures     6 Minute Walk Test (ft): 1480 ft   Vitals post: 196/100 mmHg   Spo: 98%   2 Minute Walk Test (ft): Not tested    Gait Speed (ft/s): 3 59 ft/sec; 1 10 m/s    5x Sit To Stand (s): 6 77 seconds    TUG (s):    FGA (below) 29/30      Functional Gait Assessment   3/3 Gait level surface  3/3 Change in gait speed  3/3 Gait with horizontal head turns  3/3 Gait with vertical head turns  3/3 Gait and pivot turn  3/3 Step over obstacle  3/3 Gait with narrow base of support  2/3 Gait with eyes closed  3/3 Ambulating backwards  3/3 Steps  29/30 Total score (less than 22/30 indicates increased risk of fall)       Static Balance  MCTSIB Number of Seconds   Feet Together, Eyes Open 30   Feet Together, Eyes Closed 30   Feet Together, Eyes Open Foam 30   Feet Together, Eyes Closed Foam 30     Gait Assessment: Demonstrates good heel strike, hip extension, push off, arm swing bilaterally  No ataxic gait pattern observed     AD: none        Precautions: HTN, a-fib  Manuals                              Neuro Re-Ed                                                Ther Ex      Heel raises 10x      Squats 10x      Sit to stands  10x Ther Activity                  Gait Training                  HEP Heel raises  STS  Squats  Walking 3-5 days per week   Performing regular physical activity routine  While monitoring for any symptoms                       ASSESSMENT:  Patient presents to physical therapy with diagnosis of cerebellar stroke with hemorrhagic conversion  Patient presents today with decreased (R) UE coordination with wrist and finger movements, sensation intact, and good functional strength and balance per MMT and functional outcome measures (5x sit to stand, FGA, and 6 MWT)  Patient with no deficits into the LEs, with good coordination during ambulation and stair negotiation  Patient is not at an increased risk for falls and scored very high on the FGA  Reviewed LE strengthening exercises for continued strengthening at home as well as discussed importance of normal physical activity  At this time, patient is functioning at his PLOF prior to the cerebellar stroke  He will benefit from a skilled OT evaluation to address wrist/hand motor control and coordination  Patient also with history of HTN and non compliance with regular medication  High BP readings at start of session with normal increase with physical activity  Discussed importance of regularly monitoring BP and taking medications at home  Due to patient's performance, he will be not benefit from skilled PT at this time and verbalizes understanding with medication management and regular exercise at home  Further referral needed: Yes - Occupational therapy for hand/wrist coordination and fine motor skills; Speech therapy Evaluation      PLAN OF CARE: Patient will not benefit from skilled PT at this time  Recommend follow up with PT at [place] [id number] for any concerns in the future  Patient scheduled for OT evaluation on Thursday 9/3/2020    Plan of care start date: 8/31/2020  Plan of care end date: 8/31/2020  Discussed with: patient Betty Moe, PT  8/31/2020

## 2020-09-01 ENCOUNTER — TRANSCRIBE ORDERS (OUTPATIENT)
Dept: PHYSICAL THERAPY | Facility: REHABILITATION | Age: 65
End: 2020-09-01

## 2020-09-01 DIAGNOSIS — I63.9 CEREBELLAR STROKE (HCC): Primary | ICD-10-CM

## 2020-09-04 ENCOUNTER — TELEPHONE (OUTPATIENT)
Dept: NEUROLOGY | Facility: CLINIC | Age: 65
End: 2020-09-04

## 2020-09-04 NOTE — TELEPHONE ENCOUNTER
Called patient at [phone] to offer sooner appointment in [place] with Dr Stephy Bar  There was no answer  Left message on voicemail box for call back

## 2020-09-08 NOTE — TELEPHONE ENCOUNTER
Called patient, offered to scheduled appointment 9/10, he is agreeable to this  Rescheduled  [facility] and informed her of appointment added  Saba, making you aware of add on

## 2020-09-10 ENCOUNTER — OFFICE VISIT (OUTPATIENT)
Dept: NEUROLOGY | Facility: CLINIC | Age: 65
End: 2020-09-10
Payer: COMMERCIAL

## 2020-09-10 VITALS
WEIGHT: 155 LBS | DIASTOLIC BLOOD PRESSURE: 78 MMHG | HEIGHT: 68 IN | BODY MASS INDEX: 23.49 KG/M2 | TEMPERATURE: 97.8 F | HEART RATE: 78 BPM | SYSTOLIC BLOOD PRESSURE: 142 MMHG

## 2020-09-10 DIAGNOSIS — I63.9 CEREBELLAR STROKE (HCC): Primary | ICD-10-CM

## 2020-09-10 PROCEDURE — 99213 OFFICE O/P EST LOW 20 MIN: CPT | Performed by: PSYCHIATRY & NEUROLOGY

## 2020-09-10 NOTE — PROGRESS NOTES
Patient ID: David Garcia  is a 60 y o  male  Assessment/Plan:  Hemorrhagic right cerebellar infarction    History of atrial fibrillation with intermittent compliance with anticoagulation    Patient will need to return to Eliquis anticoagulation after repeat CT scan of brain is reviewed  I discussed with the patient that he should contact his cardiologist for of the dosing who has followed him in the past     This patient has multiple strong risk factors including poor diet continued smoking heart disease and now hemorrhagic stroke    With certain he will have further cerebrovascular events if he does not change his lifestyle  I went over his current studies directly with him and discussed risk factors which are very high in this gentleman's case and also lifestyle adjustments which needs to occur  We are obtaining a CT scan of the brain and after we review that if there is no hemorrhage I have instructed him to contact his cardiologist for resuming Eliquis or other anticoagulant for his atrial fibrillation    Subjective:    HPI  Since discharge, he denies experiencing any new or worsening stroke-like symptoms  Patient reports he continues to have the following symptoms: right hand decreased coordination  Patient successfully followed up with PCP 8/27, scheduled with neurosurgery 9/23  Had outpatient PT evaluation 8/31  I reviewed medications with him  We reviewed stroke type, symptoms, personal risk factors and management, medications, and resources  Patient verbalizes understanding of teaching  Head CT ordered prior to restarting Eliquis  Objective:        Physical Exam    Neurological Exam the patient walks without any antalgic or ataxic gait  There is reduced rapid alternating movements of the right hand and some mild finger to nose endpoint dysmetria  His cranial nerves are fully intact extraocular movements are full lungs were clear he had normal speech and language    Strength was 5/5 throughout      ROS:    Review of Systems   Constitutional: Negative  Negative for appetite change and fever  HENT: Negative  Negative for hearing loss, tinnitus, trouble swallowing and voice change  Eyes: Negative  Negative for photophobia and pain  Respiratory: Negative  Negative for shortness of breath  Cardiovascular: Negative  Negative for palpitations  Gastrointestinal: Negative  Negative for nausea and vomiting  Endocrine: Negative  Negative for cold intolerance  Genitourinary: Negative  Negative for dysuria, frequency and urgency  Musculoskeletal: Negative  Negative for myalgias and neck pain  Right hand decrease coordination   Skin: Negative  Negative for rash  Neurological: Negative  Negative for dizziness, tremors, seizures, syncope, facial asymmetry, speech difficulty, weakness, light-headedness, numbness and headaches  Hematological: Negative  Does not bruise/bleed easily  Psychiatric/Behavioral: Negative  Negative for confusion, hallucinations and sleep disturbance

## 2020-09-14 NOTE — TELEPHONE ENCOUNTER
21-30 Day Post CVA Discharge Follow Up    Hospitalization: 8/19-8/24  The purpose of this phone call is to assess patient's general wellbeing or for any assistance needed with follow-up care  Discussed this information with patient via in person at appointment 9/10

## 2020-09-25 ENCOUNTER — HOSPITAL ENCOUNTER (OUTPATIENT)
Dept: RADIOLOGY | Facility: HOSPITAL | Age: 65
Discharge: HOME | End: 2020-09-25
Attending: PSYCHIATRY & NEUROLOGY
Payer: COMMERCIAL

## 2020-09-25 DIAGNOSIS — I63.9 CEREBELLAR STROKE (HCC): ICD-10-CM

## 2020-09-25 PROCEDURE — G1004 CDSM NDSC: HCPCS

## 2020-09-25 PROCEDURE — 70450 CT HEAD/BRAIN W/O DYE: CPT

## 2020-09-28 ENCOUNTER — TELEPHONE (OUTPATIENT)
Dept: NEUROLOGY | Facility: CLINIC | Age: 65
End: 2020-09-28

## 2020-09-28 NOTE — TELEPHONE ENCOUNTER
Head CT completed and resulted from 9/25 with normal results  Per plan at office visit, patient instructed to follow up with Cardiology regarding restarting Eliquis  I routed CT results to their office  Called patient to review normal results and remind him to follow up with Cardiology, but he did not answer  Left message on voicemail box for call back to with either myself or the clinical team to review plan

## 2020-10-02 NOTE — TELEPHONE ENCOUNTER
Yoni Mak,    Please let him know that in the past he was on Eliquis to prevent strokes from occurring, he definitely needs to follow with someone to discuss this since he is not on it now and might risk having a stroke  Please let him know that this will be his responsibility to follow-up  He is welcome to follow with us if he desires (in that case you can schedule an appointment)      Shanna Johnson

## 2020-10-15 NOTE — TELEPHONE ENCOUNTER
Cordelia Hutton  Male, 63 y  o , 1955   MRN: [id number]   Phone: [phone] (OTHER PHONE)   PCP: Latricia Hammans, MD   Primary Cvg: [employer]            Next Appt   With Neurosurgery  11/04/2020 at 8:00 AM           anticoagulation   Received: Today   Message Contents   MD Vanessa Ramirez MD    Cc: Rock Edgardo RN            Hi Dr Cookie Ji, The repeat CT scan of his brain shows resolution of bleeding  It is reasonable to restart his anticoagulation  Thank you  Previous Messages                        CT head wo contrast   Order: [id number]   Status: Final result Visible to patient: No (inaccessible in [address]) Dx: Cerebellar stroke with hemorrhagic co  Details     Reading Physician  Reading Date  Result Priority     Tevin Beauchamp MD  [phone]  9/25/2020           Narrative & Impression            CT BRAIN - WITHOUT CONTRAST   INDICATION: Cerebral infarction, unspecified  COMPARISON: Head CT from August 24, 2020  TECHNIQUE: CT examination of the brain was performed  In addition to axial images, sagittal and coronal 2D reformatted images were created and submitted for interpretation  Radiation dose length product (DLP) for this visit: 827 24 mGy-cm   This examination, like all CT scans performed in the [facility], was performed utilizing techniques to minimize radiation dose exposure, including the use of iterative   reconstruction and automated exposure control  IMAGE QUALITY: Diagnostic  FINDINGS:   PARENCHYMA: Residual gliosis and encephalomalacia in the right cerebellum  Previously noted hyperdense acute blood products have resolved  The edema has also resolved with improved visualization of the 4th ventricle  No new areas of parenchymal   hemorrhage  Periventricular and deep cerebral white matter chronic microangiopathic disease is unchanged  VENTRICLES AND EXTRA-AXIAL SPACES: Normal ventricular size   Resolved mass effect on the 4th ventricle  VISUALIZED ORBITS AND PARANASAL SINUSES: Unremarkable  CALVARIUM AND EXTRACRANIAL SOFT TISSUES: No calvarial abnormality  Incidentally noted cutaneous lesion arising from the right nasal soft tissues on image 6 of series 2 measuring approximately 8 mm  Recommend direct inspection  IMPRESSION:   No acute intracranial abnormality  Resolved right cerebellar hematoma with residual gliosis and encephalomalacia  Resolved mass effect on the 4th ventricle  Stable cerebral chronic microangiopathic disease  Workstation performed: [id number]                  Specimen Collected: 09/25/20 11:19 AM  Last Resulted: 09/25/20 11:22 AM    Order Details   View Encounter   Lab and Collection Details   Routing   Result History       Result Communications       Result Notes      Hung Jones MD   10/15/2020 2:32 PM     Thank you for reaching out, Dr Sinclair,   We did contact the patient and recommended that he set up an appointment and to follow up ( has not been seen by us since 2016), we are trying to reach out to him again  Hung Jones MD   10/15/2020 2:31 PM     Please let him know that his neurologist reached out to was about potentially restarting Eliquis, please let him know that if he would like to discuss this, he does need to set up an appointment with me or a cardiologist to discuss the benefits and risks  I have not seen him in over 4 years  Last time we reached out to him, he opted to not follow-up  If that is what he wants to do, let me know so I would send him back to his primary care physician to discuss this further             Follow-ups   9/28/2020 Telephone                     Routing History   Expand All Collapse All  All Reviewers List    Priority  Sent On  From  To  Last Action  Message Type  Jean Obrien MD on 10/15/2020 2:33 PM     10/15/2020 2:32 PM  MD Brittney White MD   Result Notes  Abel Duran, RN on 9/28/2020 2:46 PM     10/15/2020 2:31 PM  Rainer Melendez MD Joni Contrerasvinicio Meltonlucinda   Result Notes      10/15/2020 12:17 PM  MD Sridhar Jerome MD  Reviewed at 10/15/2020 2:33 PM  Results      Robles Goss RN  Display at 10/15/2020 2:41 PM       Detailed Action Log                                                                                               9/25/2020 11:24 AM  Interface, Radiology Results In  Robles Goss RN  Done at 9/28/2020 2:46 PM  CC'd Results      Detailed Action Log                                                               9/25/2020 11:24 AM  Interface, Radiology Results In  Balaji Swanson MD  Forward at 10/15/2020 12:15 PM  Results      Detailed Action Log

## 2020-10-15 NOTE — TELEPHONE ENCOUNTER
Reviewed patient chart, appears he has been contacted to follow up but continues to decline appointments  Called patient and advised he follow up with cardiology per Dr Katie Pope  Reeducated the importance of secondary stroke prevention  He verbalizes understanding  He will call

## 2020-10-16 ENCOUNTER — TELEPHONE (OUTPATIENT)
Dept: CARDIOLOGY CLINIC | Facility: CLINIC | Age: 65
End: 2020-10-16

## 2020-11-04 ENCOUNTER — TELEPHONE (OUTPATIENT)
Dept: NEUROSURGERY | Facility: CLINIC | Age: 65
End: 2020-11-04

## 2024-04-20 NOTE — ASSESSMENT & PLAN NOTE
Balloon removed due to being unable to cross lesion. Diagnosis: HTN  BP in -200's/110's  Per patient hasn't been taking any of his prescribed medications for over 1 year, per chart review was previously prescribed metoprolol  Pt put on cardene drip, hydralazine PRN, SBP now ranging 140-160s  Plan:  · SBP goal <160  · Cardene drip now off  · Hydralazine 10mg IV  switched to labetalol 10mg IV PRN for SBP <160  · Started PO Lisinopril 10mg, and continued on previously ordered bb   · Now improving monitor for hypotension

## 2024-09-25 NOTE — PLAN OF CARE
Problem: Nutrition/Hydration-ADULT  Goal: Nutrient/Hydration intake appropriate for improving, restoring or maintaining nutritional needs  Description: Monitor and assess patient's nutrition/hydration status for malnutrition  Collaborate with interdisciplinary team and initiate plan and interventions as ordered  Monitor patient's weight and dietary intake as ordered or per policy  Utilize nutrition screening tool and intervene as necessary  Determine patient's food preferences and provide high-protein, high-caloric foods as appropriate       INTERVENTIONS:  - Monitor oral intake, urinary output, labs, and treatment plans  - Assess nutrition and hydration status and recommend course of action  - Evaluate amount of meals eaten  - Assist patient with eating if necessary   - Allow adequate time for meals  - Recommend/ encourage appropriate diets, oral nutritional supplements, and vitamin/mineral supplements  - Order, calculate, and assess calorie counts as needed  - Recommend, monitor, and adjust tube feedings and TPN/PPN based on assessed needs  - Assess need for intravenous fluids  - Provide specific nutrition/hydration education as appropriate  - Include patient/family/caregiver in decisions related to nutrition  8/24/2020 1312 by Cassandra Lutz, RN  Outcome: Adequate for Discharge  8/24/2020 1013 by Cassandra Lutz, RN  Outcome: Progressing Take cymbalta 30 mg once daily for 7 days then take once every other day for 7 days then stop.

## 2025-01-07 ENCOUNTER — HOSPITAL ENCOUNTER (INPATIENT)
Facility: HOSPITAL | Age: 70
LOS: 7 days | Discharge: HOME | DRG: 065 | End: 2025-01-14
Attending: EMERGENCY MEDICINE | Admitting: INTERNAL MEDICINE
Payer: MEDICARE

## 2025-01-07 ENCOUNTER — APPOINTMENT (EMERGENCY)
Dept: RADIOLOGY | Facility: HOSPITAL | Age: 70
DRG: 065 | End: 2025-01-07
Payer: MEDICARE

## 2025-01-07 DIAGNOSIS — I63.9 CEREBELLAR STROKE (HCC): ICD-10-CM

## 2025-01-07 DIAGNOSIS — I63.9 CEREBROVASCULAR ACCIDENT (CVA), UNSPECIFIED MECHANISM (HCC): ICD-10-CM

## 2025-01-07 DIAGNOSIS — S42.255A CLOSED NONDISPLACED FRACTURE OF GREATER TUBEROSITY OF LEFT HUMERUS: ICD-10-CM

## 2025-01-07 DIAGNOSIS — I48.11 LONGSTANDING PERSISTENT ATRIAL FIBRILLATION (HCC): ICD-10-CM

## 2025-01-07 DIAGNOSIS — I10 ESSENTIAL HYPERTENSION: ICD-10-CM

## 2025-01-07 DIAGNOSIS — M25.512 ACUTE PAIN OF LEFT SHOULDER: ICD-10-CM

## 2025-01-07 DIAGNOSIS — F10.20 ALCOHOL DEPENDENCE (HCC): ICD-10-CM

## 2025-01-07 DIAGNOSIS — I63.531 ACUTE ISCHEMIC RIGHT PCA STROKE (HCC): Primary | ICD-10-CM

## 2025-01-07 DIAGNOSIS — E03.9 HYPOTHYROIDISM, UNSPECIFIED TYPE: ICD-10-CM

## 2025-01-07 DIAGNOSIS — I10 HTN (HYPERTENSION): ICD-10-CM

## 2025-01-07 LAB
2HR DELTA HS TROPONIN: -2 NG/L
4HR DELTA HS TROPONIN: 4 NG/L
ALBUMIN SERPL BCG-MCNC: 4 G/DL (ref 3.5–5)
ALP SERPL-CCNC: 57 U/L (ref 34–104)
ALT SERPL W P-5'-P-CCNC: 20 U/L (ref 7–52)
ANION GAP SERPL CALCULATED.3IONS-SCNC: 10 MMOL/L (ref 4–13)
APTT PPP: 29 SECONDS (ref 23–34)
AST SERPL W P-5'-P-CCNC: 23 U/L (ref 13–39)
ATRIAL RATE: 394 BPM
BASOPHILS # BLD AUTO: 0.05 THOUSANDS/ΜL (ref 0–0.1)
BASOPHILS NFR BLD AUTO: 1 % (ref 0–1)
BILIRUB SERPL-MCNC: 0.78 MG/DL (ref 0.2–1)
BUN SERPL-MCNC: 15 MG/DL (ref 5–25)
CALCIUM SERPL-MCNC: 9.2 MG/DL (ref 8.4–10.2)
CARDIAC TROPONIN I PNL SERPL HS: 11 NG/L (ref ?–50)
CARDIAC TROPONIN I PNL SERPL HS: 13 NG/L (ref ?–50)
CARDIAC TROPONIN I PNL SERPL HS: 17 NG/L (ref ?–50)
CHLORIDE SERPL-SCNC: 98 MMOL/L (ref 96–108)
CHOLEST SERPL-MCNC: 116 MG/DL (ref ?–200)
CO2 SERPL-SCNC: 29 MMOL/L (ref 21–32)
CREAT SERPL-MCNC: 1.02 MG/DL (ref 0.6–1.3)
EOSINOPHIL # BLD AUTO: 0.18 THOUSAND/ΜL (ref 0–0.61)
EOSINOPHIL NFR BLD AUTO: 3 % (ref 0–6)
ERYTHROCYTE [DISTWIDTH] IN BLOOD BY AUTOMATED COUNT: 12.7 % (ref 11.6–15.1)
ETHANOL SERPL-MCNC: <10 MG/DL
GFR SERPL CREATININE-BSD FRML MDRD: 75 ML/MIN/1.73SQ M
GLUCOSE SERPL-MCNC: 89 MG/DL (ref 65–140)
GLUCOSE SERPL-MCNC: 94 MG/DL (ref 65–140)
HCT VFR BLD AUTO: 49.1 % (ref 36.5–49.3)
HDLC SERPL-MCNC: 47 MG/DL
HGB BLD-MCNC: 17.2 G/DL (ref 12–17)
IMM GRANULOCYTES # BLD AUTO: 0.02 THOUSAND/UL (ref 0–0.2)
IMM GRANULOCYTES NFR BLD AUTO: 0 % (ref 0–2)
INR PPP: 0.93 (ref 0.85–1.19)
LDLC SERPL CALC-MCNC: 54 MG/DL (ref 0–100)
LYMPHOCYTES # BLD AUTO: 1.59 THOUSANDS/ΜL (ref 0.6–4.47)
LYMPHOCYTES NFR BLD AUTO: 24 % (ref 14–44)
MCH RBC QN AUTO: 34.8 PG (ref 26.8–34.3)
MCHC RBC AUTO-ENTMCNC: 35 G/DL (ref 31.4–37.4)
MCV RBC AUTO: 99 FL (ref 82–98)
MONOCYTES # BLD AUTO: 0.77 THOUSAND/ΜL (ref 0.17–1.22)
MONOCYTES NFR BLD AUTO: 12 % (ref 4–12)
NEUTROPHILS # BLD AUTO: 3.94 THOUSANDS/ΜL (ref 1.85–7.62)
NEUTS SEG NFR BLD AUTO: 60 % (ref 43–75)
NRBC BLD AUTO-RTO: 0 /100 WBCS
P AXIS: -83 DEGREES
PLATELET # BLD AUTO: 193 THOUSANDS/UL (ref 149–390)
PMV BLD AUTO: 10 FL (ref 8.9–12.7)
POTASSIUM SERPL-SCNC: 4.1 MMOL/L (ref 3.5–5.3)
PROT SERPL-MCNC: 7.1 G/DL (ref 6.4–8.4)
PROTHROMBIN TIME: 12.8 SECONDS (ref 12.3–15)
QRS AXIS: -27 DEGREES
QRSD INTERVAL: 78 MS
QT INTERVAL: 318 MS
QTC INTERVAL: 391 MS
RBC # BLD AUTO: 4.94 MILLION/UL (ref 3.88–5.62)
SODIUM SERPL-SCNC: 137 MMOL/L (ref 135–147)
T WAVE AXIS: 62 DEGREES
TRIGL SERPL-MCNC: 74 MG/DL (ref ?–150)
VENTRICULAR RATE: 91 BPM
WBC # BLD AUTO: 6.55 THOUSAND/UL (ref 4.31–10.16)

## 2025-01-07 PROCEDURE — 36415 COLL VENOUS BLD VENIPUNCTURE: CPT

## 2025-01-07 PROCEDURE — 85610 PROTHROMBIN TIME: CPT

## 2025-01-07 PROCEDURE — 99285 EMERGENCY DEPT VISIT HI MDM: CPT

## 2025-01-07 PROCEDURE — 80053 COMPREHEN METABOLIC PANEL: CPT

## 2025-01-07 PROCEDURE — 96375 TX/PRO/DX INJ NEW DRUG ADDON: CPT

## 2025-01-07 PROCEDURE — 80061 LIPID PANEL: CPT

## 2025-01-07 PROCEDURE — 70496 CT ANGIOGRAPHY HEAD: CPT

## 2025-01-07 PROCEDURE — 82948 REAGENT STRIP/BLOOD GLUCOSE: CPT

## 2025-01-07 PROCEDURE — 70498 CT ANGIOGRAPHY NECK: CPT

## 2025-01-07 PROCEDURE — 93005 ELECTROCARDIOGRAM TRACING: CPT

## 2025-01-07 PROCEDURE — 96365 THER/PROPH/DIAG IV INF INIT: CPT

## 2025-01-07 PROCEDURE — 93010 ELECTROCARDIOGRAM REPORT: CPT | Performed by: INTERNAL MEDICINE

## 2025-01-07 PROCEDURE — 82077 ASSAY SPEC XCP UR&BREATH IA: CPT | Performed by: EMERGENCY MEDICINE

## 2025-01-07 PROCEDURE — 96376 TX/PRO/DX INJ SAME DRUG ADON: CPT

## 2025-01-07 PROCEDURE — 85730 THROMBOPLASTIN TIME PARTIAL: CPT

## 2025-01-07 PROCEDURE — 84484 ASSAY OF TROPONIN QUANT: CPT

## 2025-01-07 PROCEDURE — 85025 COMPLETE CBC W/AUTO DIFF WBC: CPT

## 2025-01-07 PROCEDURE — 99285 EMERGENCY DEPT VISIT HI MDM: CPT | Performed by: EMERGENCY MEDICINE

## 2025-01-07 RX ORDER — ACETAMINOPHEN 325 MG/1
650 TABLET ORAL EVERY 6 HOURS PRN
Status: DISCONTINUED | OUTPATIENT
Start: 2025-01-07 | End: 2025-01-14 | Stop reason: HOSPADM

## 2025-01-07 RX ORDER — ASPIRIN 81 MG/1
81 TABLET, CHEWABLE ORAL DAILY
Status: DISCONTINUED | OUTPATIENT
Start: 2025-01-08 | End: 2025-01-07

## 2025-01-07 RX ORDER — ASPIRIN 325 MG
325 TABLET ORAL ONCE
Status: COMPLETED | OUTPATIENT
Start: 2025-01-07 | End: 2025-01-07

## 2025-01-07 RX ORDER — ATORVASTATIN CALCIUM 20 MG/1
20 TABLET, FILM COATED ORAL
Status: DISCONTINUED | OUTPATIENT
Start: 2025-01-08 | End: 2025-01-07

## 2025-01-07 RX ORDER — LABETALOL HYDROCHLORIDE 5 MG/ML
10 INJECTION, SOLUTION INTRAVENOUS ONCE
Status: COMPLETED | OUTPATIENT
Start: 2025-01-07 | End: 2025-01-07

## 2025-01-07 RX ORDER — ACETAMINOPHEN 10 MG/ML
1000 INJECTION, SOLUTION INTRAVENOUS ONCE
Status: COMPLETED | OUTPATIENT
Start: 2025-01-07 | End: 2025-01-07

## 2025-01-07 RX ORDER — LEVOTHYROXINE SODIUM 75 UG/1
75 TABLET ORAL
Status: DISCONTINUED | OUTPATIENT
Start: 2025-01-08 | End: 2025-01-14 | Stop reason: HOSPADM

## 2025-01-07 RX ORDER — METOPROLOL TARTRATE 25 MG/1
25 TABLET, FILM COATED ORAL EVERY 12 HOURS SCHEDULED
Status: DISCONTINUED | OUTPATIENT
Start: 2025-01-08 | End: 2025-01-14 | Stop reason: HOSPADM

## 2025-01-07 RX ORDER — ASPIRIN 81 MG/1
81 TABLET, CHEWABLE ORAL DAILY
Status: DISCONTINUED | OUTPATIENT
Start: 2025-01-08 | End: 2025-01-14 | Stop reason: HOSPADM

## 2025-01-07 RX ORDER — CLOPIDOGREL 300 MG/1
600 TABLET, FILM COATED ORAL ONCE
Status: COMPLETED | OUTPATIENT
Start: 2025-01-07 | End: 2025-01-07

## 2025-01-07 RX ORDER — ATORVASTATIN CALCIUM 40 MG/1
40 TABLET, FILM COATED ORAL EVERY EVENING
Status: DISCONTINUED | OUTPATIENT
Start: 2025-01-07 | End: 2025-01-07

## 2025-01-07 RX ORDER — NICOTINE 21 MG/24HR
1 PATCH, TRANSDERMAL 24 HOURS TRANSDERMAL DAILY
Status: DISCONTINUED | OUTPATIENT
Start: 2025-01-08 | End: 2025-01-14 | Stop reason: HOSPADM

## 2025-01-07 RX ORDER — ATORVASTATIN CALCIUM 40 MG/1
40 TABLET, FILM COATED ORAL
Status: DISCONTINUED | OUTPATIENT
Start: 2025-01-07 | End: 2025-01-14 | Stop reason: HOSPADM

## 2025-01-07 RX ADMIN — ACETAMINOPHEN 1000 MG: 10 INJECTION INTRAVENOUS at 19:17

## 2025-01-07 RX ADMIN — ATORVASTATIN CALCIUM 40 MG: 40 TABLET, FILM COATED ORAL at 23:03

## 2025-01-07 RX ADMIN — LABETALOL HYDROCHLORIDE 10 MG: 5 INJECTION, SOLUTION INTRAVENOUS at 16:43

## 2025-01-07 RX ADMIN — CLOPIDOGREL BISULFATE 600 MG: 300 TABLET, FILM COATED ORAL at 19:33

## 2025-01-07 RX ADMIN — LABETALOL HYDROCHLORIDE 10 MG: 5 INJECTION, SOLUTION INTRAVENOUS at 19:33

## 2025-01-07 RX ADMIN — ASPIRIN 325 MG ORAL TABLET 325 MG: 325 PILL ORAL at 19:16

## 2025-01-07 RX ADMIN — LABETALOL HYDROCHLORIDE 10 MG: 5 INJECTION, SOLUTION INTRAVENOUS at 21:54

## 2025-01-07 RX ADMIN — IOHEXOL 85 ML: 350 INJECTION, SOLUTION INTRAVENOUS at 17:21

## 2025-01-07 NOTE — ED ATTENDING ATTESTATION
1/7/2025  I, Heber Gore MD, saw and evaluated the patient. I have discussed the patient with the resident/non-physician practitioner and agree with the resident's/non-physician practitioner's findings, Plan of Care, and MDM as documented in the resident's/non-physician practitioner's note, except where noted. All available labs and Radiology studies were reviewed.  I was present for key portions of any procedure(s) performed by the resident/non-physician practitioner and I was immediately available to provide assistance.       At this point I agree with the current assessment done in the Emergency Department.  I have conducted an independent evaluation of this patient a history and physical is as follows:    ED Course     69-year-old male seen on arrival to the emergency department.  Patient has a remote history of stroke approximately 3 years ago and states that he identifies no persistent deficits.  Patient has a history of atrial fibrillation and had previously been on Eliquis, however reports that his primary care physician had taken him off of Eliquis.  Patient is presenting to the emergency department for evaluation of multiple neurologic symptoms.  Patient states that Friday (4 days prior to presentation) he had noticed a visual disturbance while he was at work.  Patient states that he was frequently seeing forearms that he thought were people in his peripheral vision who were not there.  Patient states that since Saturday (3 days prior to presentation) he noticed abnormal sensation in his left hand with decreased ability to utilize his left hand.  He states that his sister had noticed that his speech was different.  Patient had an acute dizzy episode today prompting his emergency department visit.  No chest pain, cough, shortness of breath.    On examination the patient is noted to be hypertensive.  Head is normocephalic and atraumatic.  Eyelids lashes normal.  Pupils 2 mm and bilaterally reactive.   Extraocular movements intact.  No nystagmus.  Visual fields are intact by confrontation.  Mucous membranes are dry.  Mild left-sided facial droop noticed around the mouth.  Tongue movement intact.  Lungs clear to auscultation bilaterally.  Heart irregularly irregular without any murmurs rubs or gallops.  Abdomen is nondistended, soft and nontender.  Extremities are unremarkable.  GCS is 15.  Cranial nerves II through XII are significant for mild left-sided facial droop.  Motor is 5 out of 5 bilateral upper and lower extremities.  No pronator drift is noted.  Patient has some mild ataxia on finger-to-nose on the left upper and lower extremity.    MEDICAL DECISION MAKING    Number and Complexity of Problems  Differential diagnosis: CVA, alcohol intoxication, electrolyte abnormality.    Medical Decision Making Data  External documents reviewed: Review of hospitalization from 8/19/2020 reveals that the patient was admitted for CVA which was hemorrhage within the right cerebellum.  My EKG interpretation: Atrial fibrillation at 91 bpm.  No acute ST or T wave changes.  My CT interpretation: Encephalomalacia of the right cerebellum from previous hemorrhagic stroke in 2020.      CTA head and neck with and without contrast   ED Interpretation   Encephalomalacia of the right cerebellum from previous hemorrhagic stroke in 2020.      Final Result      CT Brain: New small infarct in the right occipital lobe that could be acute. No acute hemorrhage.      CT Angiography: Proximal occlusion of the P2 segment of the right PCA.   Stable 3 mm ACOM aneurysm. Moderate stenosis cervical left ICA progressed from prior study.   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      I personally discussed this study with RAMIN BLANTON on 1/7/2025 6:56 PM.            Workstation performed: [id number]             Labs Reviewed   CBC AND DIFFERENTIAL - Abnormal       Result Value Ref Range Status    WBC 6.55   "4.31 - 10.16 Thousand/uL Final    RBC 4.94  3.88 - 5.62 Million/uL Final    Hemoglobin 17.2 (*) 12.0 - 17.0 g/dL Final    Hematocrit 49.1  36.5 - 49.3 % Final    MCV 99 (*) 82 - 98 fL Final    MCH 34.8 (*) 26.8 - 34.3 pg Final    MCHC 35.0  31.4 - 37.4 g/dL Final    RDW 12.7  11.6 - 15.1 % Final    MPV 10.0  8.9 - 12.7 fL Final    Platelets 193  149 - 390 Thousands/uL Final    nRBC 0  /100 WBCs Final    Segmented % 60  43 - 75 % Final    Immature Grans % 0  0 - 2 % Final    Lymphocytes % 24  14 - 44 % Final    Monocytes % 12  4 - 12 % Final    Eosinophils Relative 3  0 - 6 % Final    Basophils Relative 1  0 - 1 % Final    Absolute Neutrophils 3.94  1.85 - 7.62 Thousands/µL Final    Absolute Immature Grans 0.02  0.00 - 0.20 Thousand/uL Final    Absolute Lymphocytes 1.59  0.60 - 4.47 Thousands/µL Final    Absolute Monocytes 0.77  0.17 - 1.22 Thousand/µL Final    Eosinophils Absolute 0.18  0.00 - 0.61 Thousand/µL Final    Basophils Absolute 0.05  0.00 - 0.10 Thousands/µL Final   PROTIME-INR - Normal    Protime 12.8  12.3 - 15.0 seconds Final    INR 0.93  0.85 - 1.19 Final    Narrative:     INR Therapeutic Range    Indication                                             INR Range      Atrial Fibrillation                                               2.0-3.0  Hypercoagulable State                                    2.0.2.3  Left Ventricular Asist Device                            2.0-3.0  Mechanical Heart Valve                                  -    Aortic(with afib, MI, embolism, HF, LA enlargement,    and/or coagulopathy)                                     2.0-3.0 (2.5-3.5)     Mitral                                                             2.5-3.5  Prosthetic/Bioprosthetic Heart Valve               2.0-3.0  Venous thromboembolism (VTE: VT, PE        2.0-3.0   APTT - Normal    PTT 29  23 - 34 seconds Final    Comment: Therapeutic Heparin Range =  60-90 seconds   HS TROPONIN I 0HR - Normal    hs TnI 0hr 13  \"Refer to " "ACS Flowchart\"- see link ng/L Final    Comment:                                              Initial (time 0) result  If >=50 ng/L, Myocardial injury suggested ;  Type of myocardial injury and treatment strategy  to be determined.  If 5-49 ng/L, a delta result at 2 hours and or 4 hours will be needed to further evaluate.  If <4 ng/L, and chest pain has been >3 hours since onset, patient may qualify for discharge based on the HEART score in the ED.  If <5 ng/L and <3hours since onset of chest pain, a delta result at 2 hours will be needed to further evaluate.    HS Troponin 99th Percentile URL of a Health Population=12 ng/L with a 95% Confidence Interval of 8-18 ng/L.    Second Troponin (time 2 hours)  If calculated delta >= 20 ng/L,  Myocardial injury suggested ; Type of myocardial injury and treatment strategy to be determined.  If 5-49 ng/L and the calculated delta is 5-19 ng/L, consult medical service for evaluation.  Continue evaluation for ischemia on ecg and other possible etiology and repeat hs troponin at 4 hours.  If delta is <5 ng/L at 2 hours, consider discharge based on risk stratification via the HEART score (if in ED), or MELECIO risk score in IP/Observation.    HS Troponin 99th Percentile URL of a Health Population=12 ng/L with a 95% Confidence Interval of 8-18 ng/L.   MEDICAL ALCOHOL - Normal    Ethanol Lvl <10  <10 mg/dL Final   HS TROPONIN I 2HR - Normal    hs TnI 2hr 11  \"Refer to ACS Flowchart\"- see link ng/L Final    Comment:                                              Initial (time 0) result  If >=50 ng/L, Myocardial injury suggested ;  Type of myocardial injury and treatment strategy  to be determined.  If 5-49 ng/L, a delta result at 2 hours and or 4 hours will be needed to further evaluate.  If <4 ng/L, and chest pain has been >3 hours since onset, patient may qualify for discharge based on the HEART score in the ED.  If <5 ng/L and <3hours since onset of chest pain, a delta result at 2 hours " "will be needed to further evaluate.    HS Troponin 99th Percentile URL of a Health Population=12 ng/L with a 95% Confidence Interval of 8-18 ng/L.    Second Troponin (time 2 hours)  If calculated delta >= 20 ng/L,  Myocardial injury suggested ; Type of myocardial injury and treatment strategy to be determined.  If 5-49 ng/L and the calculated delta is 5-19 ng/L, consult medical service for evaluation.  Continue evaluation for ischemia on ecg and other possible etiology and repeat hs troponin at 4 hours.  If delta is <5 ng/L at 2 hours, consider discharge based on risk stratification via the HEART score (if in ED), or MELECIO risk score in IP/Observation.    HS Troponin 99th Percentile URL of a Health Population=12 ng/L with a 95% Confidence Interval of 8-18 ng/L.    Delta 2hr hsTnI -2  <20 ng/L Final   HS TROPONIN I 4HR - Normal    hs TnI 4hr 17  \"Refer to ACS Flowchart\"- see link ng/L Final    Comment:                                              Initial (time 0) result  If >=50 ng/L, Myocardial injury suggested ;  Type of myocardial injury and treatment strategy  to be determined.  If 5-49 ng/L, a delta result at 2 hours and or 4 hours will be needed to further evaluate.  If <4 ng/L, and chest pain has been >3 hours since onset, patient may qualify for discharge based on the HEART score in the ED.  If <5 ng/L and <3hours since onset of chest pain, a delta result at 2 hours will be needed to further evaluate.    HS Troponin 99th Percentile URL of a Health Population=12 ng/L with a 95% Confidence Interval of 8-18 ng/L.    Second Troponin (time 2 hours)  If calculated delta >= 20 ng/L,  Myocardial injury suggested ; Type of myocardial injury and treatment strategy to be determined.  If 5-49 ng/L and the calculated delta is 5-19 ng/L, consult medical service for evaluation.  Continue evaluation for ischemia on ecg and other possible etiology and repeat hs troponin at 4 hours.  If delta is <5 ng/L at 2 hours, consider " discharge based on risk stratification via the HEART score (if in ED), or MELECIO risk score in IP/Observation.    HS Troponin 99th Percentile URL of a Health Population=12 ng/L with a 95% Confidence Interval of 8-18 ng/L.    Delta 4hr hsTnI 4  <20 ng/L Final   POCT GLUCOSE - Normal    POC Glucose 94  65 - 140 mg/dl Final   COMPREHENSIVE METABOLIC PANEL    Sodium 137  135 - 147 mmol/L Final    Potassium 4.1  3.5 - 5.3 mmol/L Final    Chloride 98  96 - 108 mmol/L Final    CO2 29  21 - 32 mmol/L Final    ANION GAP 10  4 - 13 mmol/L Final    BUN 15  5 - 25 mg/dL Final    Creatinine 1.02  0.60 - 1.30 mg/dL Final    Comment: Standardized to IDMS reference method    Glucose 89  65 - 140 mg/dL Final    Comment: If the patient is fasting, the ADA then defines impaired fasting glucose as > 100 mg/dL and diabetes as > or equal to 123 mg/dL.    Calcium 9.2  8.4 - 10.2 mg/dL Final    AST 23  13 - 39 U/L Final    ALT 20  7 - 52 U/L Final    Comment: Specimen collection should occur prior to Sulfasalazine administration due to the potential for falsely depressed results.     Alkaline Phosphatase 57  34 - 104 U/L Final    Total Protein 7.1  6.4 - 8.4 g/dL Final    Albumin 4.0  3.5 - 5.0 g/dL Final    Total Bilirubin 0.78  0.20 - 1.00 mg/dL Final    Comment: Use of this assay is not recommended for patients undergoing treatment with eltrombopag due to the potential for falsely elevated results.  N-acetyl-p-benzoquinone imine (metabolite of Acetaminophen) will generate erroneously low results in samples for patients that have taken an overdose of Acetaminophen.    eGFR 75  ml/min/1.73sq m Final    Narrative:     National Kid[employer] guidelines for Chronic Kidney Disease (CKD):     Stage 1 with normal or high GFR (GFR > 90 mL/min/1.73 square meters)    Stage 2 Mild CKD (GFR = 60-89 mL/min/1.73 square meters)    Stage 3A Moderate CKD (GFR = 45-59 mL/min/1.73 square meters)    Stage 3B Moderate CKD (GFR = 30-44 mL/min/1.73  square meters)    Stage 4 Severe CKD (GFR = 15-29 mL/min/1.73 square meters)    Stage 5 End Stage CKD (GFR <15 mL/min/1.73 square meters)  Note: GFR calculation is accurate only with a steady state creatinine       Labs reviewed by me are significant for:  No significant lab abnormalities.    Discussed case with: Neurology, medicine.  Considered admission for: Stroke.    Treatment and Disposition  ED course: Patient underwent evaluation in the emergency department with CT/CTA.  Neurology consult placed.  Shared decision making: Patient agreeable to admission.  Code status: Full code.              Critical Care Time  Procedures

## 2025-01-07 NOTE — Clinical Note
Case was discussed with  and the patient's admission status was agreed to be Admission Status: inpatient status to the service of Dr. Valenzuela

## 2025-01-08 ENCOUNTER — APPOINTMENT (INPATIENT)
Dept: RADIOLOGY | Facility: HOSPITAL | Age: 70
DRG: 065 | End: 2025-01-08
Payer: MEDICARE

## 2025-01-08 ENCOUNTER — APPOINTMENT (INPATIENT)
Dept: NON INVASIVE DIAGNOSTICS | Facility: HOSPITAL | Age: 70
DRG: 065 | End: 2025-01-08
Payer: MEDICARE

## 2025-01-08 PROBLEM — E78.5 HYPERLIPIDEMIA: Status: ACTIVE | Noted: 2025-01-08

## 2025-01-08 PROBLEM — S42.255A CLOSED NONDISPLACED FRACTURE OF GREATER TUBEROSITY OF LEFT HUMERUS: Status: ACTIVE | Noted: 2025-01-08

## 2025-01-08 PROBLEM — F10.20 ALCOHOL DEPENDENCE (HCC): Status: ACTIVE | Noted: 2025-01-08

## 2025-01-08 PROBLEM — Z65.8 PSYCHOSOCIAL STRESSORS: Status: ACTIVE | Noted: 2025-01-08

## 2025-01-08 PROBLEM — F17.200 NICOTINE DEPENDENCE WITH CURRENT USE: Status: ACTIVE | Noted: 2025-01-08

## 2025-01-08 PROBLEM — Z91.199 PERSONAL HISTORY OF NONCOMPLIANCE WITH MEDICAL TREATMENT AND REGIMEN: Status: ACTIVE | Noted: 2025-01-08

## 2025-01-08 PROBLEM — Z96.612 STATUS POST TOTAL SHOULDER ARTHROPLASTY, LEFT: Status: ACTIVE | Noted: 2025-01-08

## 2025-01-08 PROBLEM — R29.6 UNWITNESSED FALL: Status: ACTIVE | Noted: 2025-01-08

## 2025-01-08 PROBLEM — G47.33 OSA (OBSTRUCTIVE SLEEP APNEA): Status: ACTIVE | Noted: 2025-01-08

## 2025-01-08 PROBLEM — D72.829 LEUKOCYTOSIS: Status: ACTIVE | Noted: 2025-01-08

## 2025-01-08 PROBLEM — I48.11 LONGSTANDING PERSISTENT ATRIAL FIBRILLATION (HCC): Status: ACTIVE | Noted: 2020-08-20

## 2025-01-08 LAB
ALBUMIN SERPL BCG-MCNC: 3.9 G/DL (ref 3.5–5)
ALP SERPL-CCNC: 52 U/L (ref 34–104)
ALT SERPL W P-5'-P-CCNC: 17 U/L (ref 7–52)
ANION GAP SERPL CALCULATED.3IONS-SCNC: 11 MMOL/L (ref 4–13)
AORTIC ROOT: 3.6 CM
APTT PPP: 28 SECONDS (ref 23–34)
ASCENDING AORTA: 3.5 CM
AST SERPL W P-5'-P-CCNC: 25 U/L (ref 13–39)
BASOPHILS # BLD AUTO: 0.05 THOUSANDS/ΜL (ref 0–0.1)
BASOPHILS NFR BLD AUTO: 1 % (ref 0–1)
BILIRUB SERPL-MCNC: 1.24 MG/DL (ref 0.2–1)
BSA FOR ECHO PROCEDURE: 1.91 M2
BUN SERPL-MCNC: 16 MG/DL (ref 5–25)
CALCIUM SERPL-MCNC: 8.8 MG/DL (ref 8.4–10.2)
CHLORIDE SERPL-SCNC: 99 MMOL/L (ref 96–108)
CO2 SERPL-SCNC: 26 MMOL/L (ref 21–32)
CREAT SERPL-MCNC: 0.88 MG/DL (ref 0.6–1.3)
E WAVE DECELERATION TIME: 165 MS
E/A RATIO: 104
EOSINOPHIL # BLD AUTO: 0.19 THOUSAND/ΜL (ref 0–0.61)
EOSINOPHIL NFR BLD AUTO: 2 % (ref 0–6)
ERYTHROCYTE [DISTWIDTH] IN BLOOD BY AUTOMATED COUNT: 12.7 % (ref 11.6–15.1)
EST. AVERAGE GLUCOSE BLD GHB EST-MCNC: 105 MG/DL
FRACTIONAL SHORTENING: 32 (ref 28–44)
GFR SERPL CREATININE-BSD FRML MDRD: 88 ML/MIN/1.73SQ M
GLUCOSE SERPL-MCNC: 98 MG/DL (ref 65–140)
HBA1C MFR BLD: 5.3 %
HCT VFR BLD AUTO: 44.8 % (ref 36.5–49.3)
HGB BLD-MCNC: 16.1 G/DL (ref 12–17)
IMM GRANULOCYTES # BLD AUTO: 0.04 THOUSAND/UL (ref 0–0.2)
IMM GRANULOCYTES NFR BLD AUTO: 0 % (ref 0–2)
INR PPP: 1.01 (ref 0.85–1.19)
INTERVENTRICULAR SEPTUM IN DIASTOLE (PARASTERNAL SHORT AXIS VIEW): 1.1 CM
INTERVENTRICULAR SEPTUM: 0.9 CM (ref 0.6–1.1)
LAAS-AP2: 22.9 CM2
LAAS-AP4: 22 CM2
LEFT ATRIUM SIZE: 3.9 CM
LEFT ATRIUM VOLUME (MOD BIPLANE): 66 ML
LEFT ATRIUM VOLUME INDEX (MOD BIPLANE): 34.6 ML/M2
LEFT INTERNAL DIMENSION IN SYSTOLE: 3.4 CM (ref 2.1–4)
LEFT VENTRICULAR INTERNAL DIMENSION IN DIASTOLE: 5 CM (ref 3.5–6)
LEFT VENTRICULAR POSTERIOR WALL IN END DIASTOLE: 1 CM
LEFT VENTRICULAR STROKE VOLUME: 75 ML
LV EF US.2D.A4C+ESTIMATED: 65 %
LVSV (TEICH): 75 ML
LYMPHOCYTES # BLD AUTO: 1.42 THOUSANDS/ΜL (ref 0.6–4.47)
LYMPHOCYTES NFR BLD AUTO: 13 % (ref 14–44)
MAGNESIUM SERPL-MCNC: 1.9 MG/DL (ref 1.9–2.7)
MCH RBC QN AUTO: 35.2 PG (ref 26.8–34.3)
MCHC RBC AUTO-ENTMCNC: 35.9 G/DL (ref 31.4–37.4)
MCV RBC AUTO: 98 FL (ref 82–98)
MONOCYTES # BLD AUTO: 0.93 THOUSAND/ΜL (ref 0.17–1.22)
MONOCYTES NFR BLD AUTO: 9 % (ref 4–12)
MV E'TISSUE VEL-SEP: 9 CM/S
MV PEAK A VEL: 0.01 M/S
MV PEAK E VEL: 104 CM/S
MV STENOSIS PRESSURE HALF TIME: 48 MS
MV VALVE AREA P 1/2 METHOD: 4.58
NEUTROPHILS # BLD AUTO: 7.94 THOUSANDS/ΜL (ref 1.85–7.62)
NEUTS SEG NFR BLD AUTO: 75 % (ref 43–75)
NRBC BLD AUTO-RTO: 0 /100 WBCS
PLATELET # BLD AUTO: 185 THOUSANDS/UL (ref 149–390)
PMV BLD AUTO: 9.8 FL (ref 8.9–12.7)
POTASSIUM SERPL-SCNC: 4.2 MMOL/L (ref 3.5–5.3)
PROT SERPL-MCNC: 6.6 G/DL (ref 6.4–8.4)
PROTHROMBIN TIME: 13.6 SECONDS (ref 12.3–15)
RBC # BLD AUTO: 4.57 MILLION/UL (ref 3.88–5.62)
RIGHT ATRIUM AREA SYSTOLE A4C: 26.4 CM2
RIGHT VENTRICLE ID DIMENSION: 3.9 CM
SL CV LEFT ATRIUM LENGTH A2C: 6.3 CM
SL CV LV EF: 65
SL CV PED ECHO LEFT VENTRICLE DIASTOLIC VOLUME (MOD BIPLANE) 2D: 121 ML
SL CV PED ECHO LEFT VENTRICLE SYSTOLIC VOLUME (MOD BIPLANE) 2D: 46 ML
SODIUM SERPL-SCNC: 136 MMOL/L (ref 135–147)
T4 FREE SERPL-MCNC: 0.52 NG/DL (ref 0.61–1.12)
TR MAX PG: 30 MMHG
TR PEAK VELOCITY: 2.7 M/S
TRICUSPID ANNULAR PLANE SYSTOLIC EXCURSION: 1.8 CM
TRICUSPID VALVE PEAK REGURGITATION VELOCITY: 2.73 M/S
TSH SERPL DL<=0.05 MIU/L-ACNC: 10.92 UIU/ML (ref 0.45–4.5)
WBC # BLD AUTO: 10.57 THOUSAND/UL (ref 4.31–10.16)

## 2025-01-08 PROCEDURE — 99232 SBSQ HOSP IP/OBS MODERATE 35: CPT

## 2025-01-08 PROCEDURE — 99223 1ST HOSP IP/OBS HIGH 75: CPT | Performed by: INTERNAL MEDICINE

## 2025-01-08 PROCEDURE — 85730 THROMBOPLASTIN TIME PARTIAL: CPT | Performed by: INTERNAL MEDICINE

## 2025-01-08 PROCEDURE — 93306 TTE W/DOPPLER COMPLETE: CPT | Performed by: INTERNAL MEDICINE

## 2025-01-08 PROCEDURE — 85025 COMPLETE CBC W/AUTO DIFF WBC: CPT | Performed by: INTERNAL MEDICINE

## 2025-01-08 PROCEDURE — 99223 1ST HOSP IP/OBS HIGH 75: CPT

## 2025-01-08 PROCEDURE — 70551 MRI BRAIN STEM W/O DYE: CPT

## 2025-01-08 PROCEDURE — 80053 COMPREHEN METABOLIC PANEL: CPT | Performed by: INTERNAL MEDICINE

## 2025-01-08 PROCEDURE — 99223 1ST HOSP IP/OBS HIGH 75: CPT | Performed by: PSYCHIATRY & NEUROLOGY

## 2025-01-08 PROCEDURE — 73030 X-RAY EXAM OF SHOULDER: CPT

## 2025-01-08 PROCEDURE — 84439 ASSAY OF FREE THYROXINE: CPT | Performed by: INTERNAL MEDICINE

## 2025-01-08 PROCEDURE — 85610 PROTHROMBIN TIME: CPT | Performed by: INTERNAL MEDICINE

## 2025-01-08 PROCEDURE — 83735 ASSAY OF MAGNESIUM: CPT | Performed by: INTERNAL MEDICINE

## 2025-01-08 PROCEDURE — 99222 1ST HOSP IP/OBS MODERATE 55: CPT | Performed by: ORTHOPAEDIC SURGERY

## 2025-01-08 PROCEDURE — 36415 COLL VENOUS BLD VENIPUNCTURE: CPT | Performed by: INTERNAL MEDICINE

## 2025-01-08 PROCEDURE — 83036 HEMOGLOBIN GLYCOSYLATED A1C: CPT

## 2025-01-08 PROCEDURE — 93306 TTE W/DOPPLER COMPLETE: CPT

## 2025-01-08 PROCEDURE — 84443 ASSAY THYROID STIM HORMONE: CPT | Performed by: INTERNAL MEDICINE

## 2025-01-08 RX ORDER — FOLIC ACID 1 MG/1
1 TABLET ORAL DAILY
Status: DISCONTINUED | OUTPATIENT
Start: 2025-01-09 | End: 2025-01-14 | Stop reason: HOSPADM

## 2025-01-08 RX ORDER — LIDOCAINE 50 MG/G
1 PATCH TOPICAL DAILY
Status: DISCONTINUED | OUTPATIENT
Start: 2025-01-09 | End: 2025-01-14 | Stop reason: HOSPADM

## 2025-01-08 RX ORDER — LISINOPRIL 10 MG/1
10 TABLET ORAL DAILY
Status: DISCONTINUED | OUTPATIENT
Start: 2025-01-08 | End: 2025-01-09

## 2025-01-08 RX ORDER — HYDRALAZINE HYDROCHLORIDE 25 MG/1
25 TABLET, FILM COATED ORAL EVERY 8 HOURS PRN
Status: DISCONTINUED | OUTPATIENT
Start: 2025-01-08 | End: 2025-01-14 | Stop reason: HOSPADM

## 2025-01-08 RX ORDER — HYDRALAZINE HYDROCHLORIDE 20 MG/ML
10 INJECTION INTRAMUSCULAR; INTRAVENOUS EVERY 6 HOURS PRN
Status: DISCONTINUED | OUTPATIENT
Start: 2025-01-08 | End: 2025-01-08

## 2025-01-08 RX ORDER — LANOLIN ALCOHOL/MO/W.PET/CERES
100 CREAM (GRAM) TOPICAL DAILY
Status: DISCONTINUED | OUTPATIENT
Start: 2025-01-08 | End: 2025-01-08

## 2025-01-08 RX ADMIN — HYDRALAZINE HYDROCHLORIDE 10 MG: 20 INJECTION INTRAMUSCULAR; INTRAVENOUS at 10:05

## 2025-01-08 RX ADMIN — THIAMINE HCL TAB 100 MG 100 MG: 100 TAB at 16:37

## 2025-01-08 RX ADMIN — ATORVASTATIN CALCIUM 40 MG: 40 TABLET, FILM COATED ORAL at 16:37

## 2025-01-08 RX ADMIN — ACETAMINOPHEN 650 MG: 325 TABLET, FILM COATED ORAL at 12:49

## 2025-01-08 RX ADMIN — LEVOTHYROXINE SODIUM 75 MCG: 75 TABLET ORAL at 05:28

## 2025-01-08 RX ADMIN — METOPROLOL TARTRATE 25 MG: 25 TABLET, FILM COATED ORAL at 08:24

## 2025-01-08 RX ADMIN — ASPIRIN 81 MG CHEWABLE TABLET 81 MG: 81 TABLET CHEWABLE at 08:24

## 2025-01-08 RX ADMIN — METOPROLOL TARTRATE 25 MG: 25 TABLET, FILM COATED ORAL at 21:07

## 2025-01-08 RX ADMIN — LISINOPRIL 10 MG: 10 TABLET ORAL at 08:24

## 2025-01-08 NOTE — CONSULTS
Consultation - Neurology   Name: Jose Ramon Quiñonez Jr. 69 y.o. male I MRN: [id number]  Unit/Bed#: [facility] 719-01 I Date of Admission: 1/7/2025   Date of Service: 1/8/2025 I Hospital Day: 1   Consult to neurology  Consult performed by: Dylan Riley DO  Consult ordered by: Heber Gore MD        Physician Requesting Evaluation: Leilani Gutierrez MD   Reason for Evaluation / Principal Problem: new stroke on imaging / sxs    Assessment & Plan  CVA (cerebral vascular accident) (HCC)  69 y.o. male with pertinent PMH of A-fib not compliant on Eliquis, uncontrolled hypertension, hyperlipidemia, RAUDEL noncompliant on CPAP, prior R cerebellar stroke approximately 3 years ago per patient report. Pt presenting with stroke sx of left upper extremity weakness and numbness, gait imbalance, LFD, dysarthria, lack of coordination, and episodes of waxing and waning blurry vision bilaterally. LKW several days ago. Initial BP Blood Pressure: (!) 231/124, FSBG POC Glucose: 94. NC CTH completed and CTA H/N performed, results as noted below. As a result of > 4.5 hours from time of symptom onset and Unclear time of onset outside appropriate time window patient was not determined to be a candidate for thrombolysis (TNK).     - Home Antiplatelet /Anticoagulants PTA: No antiplatelet or anticoagulants  - Stroke risk factors: HTN, HLD, Atrial Fibrillation, Prior Stroke/TIA, and RAUDEL  - Prior Stroke Hx: yes: embolic right cerebellum    Workup:  - CTH: New small infarct in the right occipital lobe that could be acute. No acute hemorrhage.   - CTA: Proximal occlusion of the P2 segment of the right PCA. Stable 3 mm ACOM aneurysm. Moderate stenosis cervical left ICA progressed from prior study. 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  - MRI: Recent infarctions of R thalamus, R occipital lobe and R temporal lobe.  Stable old R cerebellar infarct  - TTE: 65% EF, LA mildly dilated.  - Labs: Hemoglobin A1c 5.3  (1/8/2025), LDL 54 (1/7/2025)    Impression: 69 y.o. male with history of many uncontrolled stroke risk factors after removing himself from all medications approximately 6 months ago, currently on no AC for active A-fib, who presented with many symptoms as noted above concerning for stroke, with findings on CTh and CTA HN showing a new infarct to the right occipital lobe appearing recent, suspect patient likely had CVA from medication noncompliance, etiology from cardioembolic or atheroembolic origin    Plan: Discussed plan with neurology attending, Dr. Garland  Admit along the stroke pathway with telemetry monitoring  Strict NPO prior to dysphagia screen   Frequent neuro checks per protocol.   If acute changes in exam, please obtain stat CT head and notify neurology team  BP Goals: Normotension of <130/80  Antiplatelet agents: ASA 81 mg following load, plavix loaded but dc  Anticoagulation: Hold until 1/10/25, then may restart AC  Statin: Start Atorvastatin 40 mg QHS  TTE, pending  Euthermic/Euglycemic  DVT PPx: per primary team, SCDs  PT/OT/ST/PMR evaluations when able  Stroke education and counseling  CPAP rec if tolerated  Rest of other care per primary team appreciated    Disposition: PT Recommendations -    Longstanding persistent atrial fibrillation (HCC)    HTN (hypertension)    Hypothyroid    RAUDEL (obstructive sleep apnea)      I have discussed with Dr. Lundy the above plan to treat pt. He agrees with the plan.  Please contact the SecureChat role for the Neurology service with any questions/concerns.    Recommendations for outpatient neurological follow up have yet to be determined.    History of Present Illness   Jose Ramon Quiñonez Jr. is a 69 y.o. male who presents with many neurologic complaints with earliest known neurologic symptom of blurry vision bilaterally starting several days ago following a waxing and waning pattern to date, followed by fluctuating levels of numbness/possible weakness to distal >  proximal LUE over past couple days, followed by gait instability >dizziness beginning day prior to arrival, the difficulty of gait being the reason to seek out medical care.  Patient reports that prior to these past few days, had not been having any neurologic symptoms in years, reports that prior stroke was approximately 3 years ago and no residual deficits from that continue.  Patient has significant past medical history as noted above which includes noncompliance on statin, antihypertensives, and Eliquis AC for with the related medical conditions, notes that supposedly the family doctor stopped ordering the medications when patient refused to continue with appointments for years.  Patient states last time he took any of these medications was approximately 6 months ago.  Patient is actively in A-fib in the ER and CT findings already reveal recent stroke.  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    Review of Systems   Constitutional:  Positive for activity change. Negative for appetite change, chills, diaphoresis, fatigue, fever and unexpected weight change.   Eyes:  Positive for visual disturbance. Negative for photophobia, pain, discharge, redness and itching.   Neurological:  Positive for dizziness, facial asymmetry, speech difficulty, weakness and numbness. Negative for tremors, seizures, syncope, light-headedness and headaches.   Psychiatric/Behavioral:  Positive for confusion and decreased concentration. Negative for agitation, behavioral problems, dysphoric mood, hallucinations, self-injury, sleep disturbance and suicidal ideas. The patient is not nervous/anxious and is not hyperactive.         I have reviewed the patient's PMH, PSH, Social History, Family History, Meds, and Allergies  Historical Information   Past Medical History:   Diagnosis Date    A-fib (HCC)     Disease of thyroid  gland     Hypertension     Stroke (HCC)      Past Surgical History:   Procedure Laterality Date    ROTATOR CUFF REPAIR       Social History     Tobacco Use    Smoking status: Every Day     Current packs/day: 0.50     Types: Cigarettes    Smokeless tobacco: Never   Vaping Use    Vaping status: Never Used   Substance and Sexual Activity    Alcohol use: Yes    Drug use: Not Currently    Sexual activity: Not on file     E-Cigarette/Vaping    E-Cigarette Use Never User      E-Cigarette/Vaping Substances     Family History   Problem Relation Age of Onset    No Known Problems Mother     Diabetes Father      Social History     Tobacco Use    Smoking status: Every Day     Current packs/day: 0.50     Types: Cigarettes    Smokeless tobacco: Never   Vaping Use    Vaping status: Never Used   Substance and Sexual Activity    Alcohol use: Yes    Drug use: Not Currently    Sexual activity: Not on file       Current Facility-Administered Medications:     acetaminophen (TYLENOL) tablet 650 mg, Q6H PRN    aspirin chewable tablet 81 mg, Daily    atorvastatin (LIPITOR) tablet 40 mg, Daily With Dinner    hydrALAZINE (APRESOLINE) injection 10 mg, Q6H PRN    levothyroxine tablet 75 mcg, Early Morning    lisinopril (ZESTRIL) tablet 10 mg, Daily    metoprolol tartrate (LOPRESSOR) tablet 25 mg, Q12H MALGORZATA    nicotine (NICODERM CQ) 21 mg/24 hr TD 24 hr patch 1 patch, Daily  Prior to Admission Medications   Prescriptions Last Dose Informant Patient Reported? Taking?   acetaminophen (TYLENOL) 325 mg tablet Not Taking  No No   Sig: Take 2 tablets (650 mg total) by mouth every 6 (six) hours as needed for mild pain or headaches   Patient not taking: Reported on 1/8/2025   aspirin 81 mg chewable tablet Not Taking  No No   Sig: Chew 1 tablet (81 mg total) daily   Patient not taking: Reported on 1/8/2025   atorvastatin (LIPITOR) 20 mg tablet Not Taking  No No   Sig: Take 1 tablet (20 mg total) by mouth daily with dinner   Patient not taking: Reported on  1/8/2025   levothyroxine 75 mcg tablet Not Taking  No No   Sig: Take 1 tablet (75 mcg total) by mouth daily in the early morning   Patient not taking: Reported on 1/8/2025   lisinopril (ZESTRIL) 10 mg tablet Not Taking  No No   Sig: Take 1 tablet (10 mg total) by mouth daily   Patient not taking: Reported on 1/8/2025   metoprolol tartrate (LOPRESSOR) 25 mg tablet Not Taking  No No   Sig: Take 1 tablet (25 mg total) by mouth every 12 (twelve) hours   Patient not taking: Reported on 1/8/2025      Facility-Administered Medications: None     Patient has no known allergies.    Objective :  Temp:  [98 °F (36.7 °C)-98.1 °F (36.7 °C)] 98.1 °F (36.7 °C)  HR:  [] 73  BP: (144-231)/() 177/101  Resp:  [11-32] 18  SpO2:  [96 %-100 %] 100 %  O2 Device: None (Room air)    Physical Exam  Vitals and nursing note reviewed.   Constitutional:       General: He is not in acute distress.     Appearance: He is not diaphoretic.   HENT:      Head: Normocephalic and atraumatic.      Right Ear: External ear normal.      Left Ear: External ear normal.      Nose: Nose normal.      Mouth/Throat:      Pharynx: Oropharynx is clear.   Eyes:      General: Lids are normal. No scleral icterus.        Right eye: No discharge.         Left eye: No discharge.      Extraocular Movements: Extraocular movements intact.      Conjunctiva/sclera: Conjunctivae normal.      Pupils: Pupils are equal, round, and reactive to light.   Neurological:      Mental Status: He is alert.      Cranial Nerves: Dysarthria present.      Deep Tendon Reflexes: Reflexes are normal and symmetric.     Neurological Exam  Mental Status  Alert. Oriented to person, place and time. Mild dysarthria present. Follows two-step commands.    Cranial Nerves  CN I: Sense of smell is normal.  CN II: Visual fields full to confrontation.  CN III, IV, VI: Extraocular movements intact bilaterally. Normal lids and orbits bilaterally. Pupils equal round and reactive to light  bilaterally.  CN V: Facial sensation is normal.  CN VII:  Right: There is no facial weakness.  Left: There is central facial weakness.    Motor  Normal muscle bulk throughout. No fasciculations present. Normal muscle tone. No abnormal involuntary movements. Strength is 5/5 in all four extremities except as noted. Left hemiparesis.  Left upper extremity deficits to strength distal > proximal w/ 4- to 4+ throughout, left lower extremity distally mild reduction to strength against resistance, mild LFD..    Sensory  Light touch abnormality: Left hemisensory loss.   Left sided numbness.    Reflexes  Deep tendon reflexes are 2+ and symmetric in all four extremities.    Coordination  Right: Finger-to-nose normal. Heel-to-shin normal.Left: Finger-to-nose abnormality: Heel-to-shin abnormality:  Left FTN seems limited by strength, left HTS notable ataxia.        Lab Results: I have reviewed the following results:CBC:   Results from last 7 days   Lab Units 01/08/25  0458 01/07/25  1318   WBC Thousand/uL 10.57* 6.55   RBC Million/uL 4.57 4.94   HEMOGLOBIN g/dL 16.1 17.2*   HEMATOCRIT % 44.8 49.1   MCV fL 98 99*   PLATELETS Thousands/uL 185 193   , Coagulation:   Results from last 7 days   Lab Units 01/08/25  0458   INR  1.01     Recent Labs     01/08/25  0458   WBC 10.57*   HGB 16.1   HCT 44.8      SODIUM 136   K 4.2   CL 99   CO2 26   BUN 16   CREATININE 0.88   GLUC 98   MG 1.9     Imaging Results Review: I personally reviewed the following image studies in PACS and associated radiology reports: CT head. My interpretation of the radiology images/reports is: Agree w/ above.  Other Study Results Review: EKG was reviewed.

## 2025-01-08 NOTE — ASSESSMENT & PLAN NOTE
Pt reports noncompliance with medications for the last several months.  Pt has not seen PCP for medication refills  Plan to set up PCP

## 2025-01-08 NOTE — CONSULTS
Reason for Consult / Principal Problem:HTN    Physician Requesting Consult:  Leilani Gutierrez MD    Cardiologist: Dr. Lee Becker        Assessment and Plan      Current Problem List   Principal Problem:    CVA (cerebral vascular accident) (HCC)  Active Problems:    Longstanding persistent atrial fibrillation (HCC)    HTN (hypertension)    Hypothyroid    Aneurysm (HCC)    RAUDEL (obstructive sleep apnea)    Unwitnessed fall    Hyperlipidemia    Leukocytosis    Status post total shoulder arthroplasty, left    Personal history of noncompliance with medical treatment and regimen    Nicotine dependence with current use    Alcohol dependence (HCC)    Psychosocial stressors    Assessment/Plan:    1.  Hypertension   BP 230s/120s in ED yesterday iso stroke; currently symptomatic w/ blurry visio, possibly due to stroke vs. HTN. No headache, CP, or SOB.  Home medications include metoprolol tartrate 25mg BID and lisinopril 10mg daily- patient endorses that he has been non-compliant with any of his medications for the past 5-6 months  S/p labetalol 10mg IV x3 in ED  BP remaining elevated despite restarting home metoprolol and lisinopril this AM as well as PRN hydralazine x1  BP today downtrending from 200s systolic to 160s  Goal BP per neuro- <130s/80s  Patient's home medications likely have not reached full therapeutic effect after restarting - would not recommend adding new medication at this time and would continue with home regimen  Increase PRN hydralazine to 25mg PO Q6H for systolic BP >160  2.  Atrial Fibrillation   Previous on eliquis 5mg BID and metoprolol tartrate 25mg BID, however patient has been non-compliant  Per neuro, recommend restarting Eliquis on 1/10 d/t recent stroke  C/W metoprolol      Subjective     CC: blurry vision, weakness, and gait disturbance    HPI: Jose Ramon Quiñonez Jr. 69 y.o. year old male w/ PMH CVA 2020 w/o deficit, HTN, HLD, Afib non-compliant w/ Eliquis, current tobacco use who presents with  stroke-like symptoms.    Patient states that several days ago he started to develop blurry vision. He then started to develop decreased sensation and weakness in his L arm, which progressively worsened. The day prior to his presentation, he states that he developed dizziness and abnormal gait, which caused him to fall on his L side, which finally prompted him to come to the ED.     He states that his PCP has not given him refills of his medications because he has not gone for a check-up for some time. He states that he has not taken any of his medications in the past 5-6 months. He has a history of longstanding persistent afib since 2015. He was previously considered for cardioversion, however, he did not want to have the procedure at the time and elected to take medications. He has been on eliquis and metoprolol. He also has a history of HTN which he was previously taking lisinopril 10mg daily. He also smokes 0.5 packs of cigarettes daily and has a 26 pk/yr history.     In the ED the patient was hypertensive to the 230s systolic. CTH showed small infarct of R occipital lobe. MRI confirmed the infarct and also showed infarct of R thalamus and R temporal lobe. He was not a candidate for surgery or TNK due to being out of window period. He has been getting IV boluses of labetalol for his BP and restarted on home meds. Also was started on PRN hydralazine. He had a L shoulder X-ray today which showed a non-displaced L humeral head fracture.     Today patient was seen and examined at bedside. He endorsed the story as above. He is currently complaining of pain in his L shoulder. He states that his blurry vision has improved somewhat. Currently denies any headache, dizziness, light-headedness, chest pain, SOB, abdominal pain, nausea, vomiting, fevers, or chills.    Remainder of ROS done and negative    TREADMILL STRESS  No results found for this or any previous visit.      ----------------------------------------------------------------------------------------------  NUCLEAR STRESS TEST: No results found for this or any previous visit.    No results found for this or any previous visit.      --------------------------------------------------------------------------------  CATH:  No results found for this or any previous visit.    --------------------------------------------------------------------------------  ECHO:   Results for orders placed during the hospital encounter of 20    Echo complete with contrast if indicated    Narrative  [address] [address]  [phone]    Transthoracic Echocardiogram  2D, M-mode, Doppler, and Color Doppler    Study date:  20-Aug-2020    Patient: KINSEY COTTON  MR number: [id number]  Account number: [id number]  : 1955  Age: 64 years  Gender: Male  Status: Inpatient  Location: Bedside  Height: 68 in  Weight: 151 lb  BP: 142/ 95 mmHg    Indications: TIA.    Diagnoses: G45.9 - Transient cerebral ischemic attack, unspecified    Sonographer:  Roro Pina  Referring Physician:  Jeronimo Harris DO  Group:  [facility]  Interpreting Physician:  Sunday Damon MD    SUMMARY    LEFT VENTRICLE:  Systolic function was normal. Ejection fraction was estimated to be 55 %.  Although no diagnostic regional wall motion abnormality was identified, this possibility cannot be completely excluded on the basis of this study.  Wall thickness was mildly increased.    LEFT ATRIUM:  The atrium was mildly dilated.    RIGHT ATRIUM:  The atrium was mildly dilated.    MITRAL VALVE:  There was mild annular calcification.  There was trace regurgitation.    AORTIC VALVE:  There was trace regurgitation.    TRICUSPID VALVE:  There was trace regurgitation.    HISTORY: PRIOR HISTORY: Dizziness. Risk factors: hypertension and a history of current cigarette use (within the last  month).    PROCEDURE: The procedure was performed at the bedside. This was a routine study. The transthoracic approach was used. The study included complete 2D imaging, M-mode, complete spectral Doppler, and color Doppler. The heart rate was 97 bpm,  at the start of the study. Images were obtained from the parasternal, apical, subcostal, and suprasternal notch acoustic windows. Image quality was adequate.    LEFT VENTRICLE: Size was normal. Systolic function was normal. Ejection fraction was estimated to be 55 %. Although no diagnostic regional wall motion abnormality was identified, this possibility cannot be completely excluded on the basis  of this study. Wall thickness was mildly increased. DOPPLER: Transmitral flow pattern: atrial fibrillation. Left ventricular diastolic function parameters were abnormal.    RIGHT VENTRICLE: The size was normal. Systolic function was normal. Wall thickness was normal.    LEFT ATRIUM: The atrium was mildly dilated.    RIGHT ATRIUM: The atrium was mildly dilated.    MITRAL VALVE: There was mild annular calcification. There was mild diffuse thickening. There was normal leaflet separation. DOPPLER: The transmitral velocity was within the normal range. There was no evidence for stenosis. There was trace  regurgitation.    AORTIC VALVE: The valve was trileaflet. Leaflets exhibited normal thickness, normal cuspal separation, and sclerosis. DOPPLER: Transaortic velocity was within the normal range. There was no evidence for stenosis. There was trace  regurgitation.    TRICUSPID VALVE: The valve structure was normal. There was normal leaflet separation. DOPPLER: The transtricuspid velocity was within the normal range. There was no evidence for stenosis. There was trace regurgitation.    PULMONIC VALVE: Not well visualized. DOPPLER: The transpulmonic velocity was within the normal range. There was no significant regurgitation.    PERICARDIUM: There was no pericardial effusion. The  pericardium was normal in appearance.    AORTA: The root exhibited normal size.    SYSTEMIC VEINS: IVC: The inferior vena cava was normal in size.    PULMONARY VEINS: DOPPLER: Doppler signals were not recordable in the pulmonary vein(s).    SYSTEM MEASUREMENT TABLES    2D  %FS: 29.03 %  Ao Diam: 3.44 cm  EDV(Teich): 63.25 ml  EF(Teich): 56.48 %  ESV(Teich): 27.53 ml  IVSd: 1.13 cm  LA Area: 19.02 cm2  LA Diam: 4.02 cm  LVEDV MOD A4C: 36.28 ml  LVEF MOD A4C: 49.82 %  LVESV MOD A4C: 18.2 ml  LVIDd: 3.83 cm  LVIDs: 2.72 cm  LVLd A4C: 5.81 cm  LVLs A4C: 5.29 cm  LVPWd: 1.31 cm  RA Area: 17.85 cm2  RVIDd: 3 cm  SV MOD A4C: 18.07 ml  SV(Teich): 35.73 ml    MM  TAPSE: 1.75 cm    PW  E': 0.07 m/s  E/E': 13.77  MV A Bjorn: 0 m/s  MV Dec [place]: 5.79 m/s2  MV DecT: 163.83 ms  MV E Bjorn: 0.95 m/s  MV E/A Ratio: 923.75  MV PHT: 47.51 ms  MVA By PHT: 4.63 cm2    [facility]    Prepared and electronically signed by    Sunday Damon MD  Signed 20-Aug-2020 11:08:11    No results found for this or any previous visit.    --------------------------------------------------------------------------------  HOLTER  No results found for this or any previous visit.    --------------------------------------------------------------------------------  CAROTIDS  No results found for this or any previous visit.       [unfilled]     Telemetry: N/A    Net fluid balance:  +150mL    Weight: 77.1 kg    EKG: atrial fibrillation When compared with ECG of 20-Aug-2020 05:35, QRS axis Shifted left, T wave amplitude has increased in Anterior leads, and QT has shortened    Device Interrogation: N/A    CHEST X-RAY: N/A    CT scan: R occipital small infarct          Family History:   Historical Information   Past Medical History:   Diagnosis Date    A-fib (HCC)     Disease of thyroid gland     Hypertension     Stroke (HCC)      Past Surgical History:   Procedure Laterality Date    ROTATOR CUFF REPAIR        Social History   Social History     Substance and Sexual Activity   Alcohol Use Yes     Social History     Substance and Sexual Activity   Drug Use Not Currently     Social History     Tobacco Use   Smoking Status Every Day    Current packs/day: 0.50    Types: Cigarettes   Smokeless Tobacco Never     Family History:   Family History   Problem Relation Age of Onset    No Known Problems Mother     Diabetes Father        Review of Systems:  Review of Systems   Constitutional:  Negative for chills, fatigue and fever.   Eyes:  Positive for visual disturbance.   Respiratory:  Negative for cough, chest tightness and shortness of breath.    Cardiovascular:  Negative for chest pain, palpitations and leg swelling.   Gastrointestinal:  Negative for abdominal distention, abdominal pain, diarrhea, nausea and vomiting.   Neurological:  Negative for dizziness, light-headedness and headaches.           Scheduled Meds:  Current Facility-Administered Medications   Medication Dose Route Frequency Provider Last Rate    acetaminophen  650 mg Oral Q6H PRN Teja Lowry, DO      aspirin  81 mg Oral Daily Teja Lowry, DO      atorvastatin  40 mg Oral Daily With Dinner Teja Lowry, DO      hydrALAZINE  25 mg Oral Q8H PRN Geo Rizo, DO      levothyroxine  75 mcg Oral Early Morning Teja Lowry, DO      lisinopril  10 mg Oral Daily Teja Lowry, DO      metoprolol tartrate  25 mg Oral Q12H MALGORZATA Teja Lowry, DO      nicotine  1 patch Transdermal Daily Teja Lowry, DO      thiamine  100 mg Oral Daily Leilani Gutierrez MD       Continuous Infusions:   PRN Meds:.  acetaminophen    hydrALAZINE  all current active meds have been reviewed    No Known Allergies    Objective   Vitals: Temp (24hrs), Av.2 °F (36.8 °C), Min:98.1 °F (36.7 °C), Max:98.3 °F (36.8 °C)  Current: Temperature: 98.3 °F (36.8 °C)  Patient Vitals for the past 24 hrs:   BP Temp Temp src Pulse Resp SpO2 Height Weight   25 1515 158/100 98.3 °F (36.8 °C) --  "84 16 97 % -- --   01/08/25 1203 169/99 -- -- 73 -- 97 % -- --   01/08/25 1008 (!) 177/101 -- -- 73 -- 100 % -- --   01/08/25 1005 (!) 177/101 -- -- -- -- -- -- --   01/08/25 1004 144/93 -- -- 68 -- 96 % -- --   01/08/25 0933 (!) 180/110 -- -- 78 -- 98 % 5' 8\" (1.727 m) 77.1 kg (170 lb)   01/08/25 0825 (!) 183/106 -- -- 85 -- 98 % -- --   01/08/25 0824 (!) 183/106 -- -- 85 -- -- -- --   01/08/25 0749 (!) 196/134 98.1 °F (36.7 °C) Oral 85 18 99 % 5' 8\" (1.727 m) --   01/08/25 0726 (!) 186/96 -- -- 83 18 97 % -- --   01/08/25 0600 (!) 189/110 -- -- 78 20 97 % -- --   01/08/25 0400 (!) 178/84 -- -- 80 20 99 % -- --   01/08/25 0300 (!) 202/127 -- -- 82 20 99 % -- --   01/08/25 0200 (!) 187/111 -- -- 78 14 98 % -- --   01/08/25 0100 (!) 173/93 -- -- 80 20 98 % -- --   01/08/25 0000 (!) 188/113 -- -- 80 20 98 % -- --   01/07/25 2300 (!) 189/97 -- -- 80 16 99 % -- --   01/07/25 2200 (!) 189/97 -- -- 80 20 98 % -- --   01/07/25 2145 (!) 194/112 -- -- 80 16 99 % -- --   01/07/25 2100 (!) 176/113 -- -- 78 16 96 % -- --   01/07/25 2030 163/97 -- -- -- 16 -- -- --   01/07/25 2000 (!) 177/102 -- -- 82 16 99 % -- --   01/07/25 1951 (!) 163/102 -- -- 82 16 98 % -- --   01/07/25 1930 (!) 176/97 -- -- 84 16 -- -- --   01/07/25 1929 (!) 204/91 -- -- 93 20 98 % -- --   01/07/25 1900 (!) 191/91 -- -- 86 13 96 % -- --   01/07/25 1800 (!) 186/113 -- -- 86 18 97 % -- --    Body mass index is 25.85 kg/m².  Orthostatic Blood Pressures      Flowsheet Row Most Recent Value   Blood Pressure 158/100 filed at 01/08/2025 1515   Patient Position - Orthostatic VS Lying filed at 01/08/2025 1008                Invasive Devices       Peripheral Intravenous Line  Duration             Peripheral IV 01/07/25 Left Antecubital 1 day                    Physical Exam:  Physical Exam  Constitutional:       Appearance: Normal appearance.   HENT:      Head: Normocephalic.      Mouth/Throat:      Mouth: Mucous membranes are moist.   Eyes:      Extraocular " Movements: Extraocular movements intact.      Pupils: Pupils are equal, round, and reactive to light.   Cardiovascular:      Rate and Rhythm: Normal rate. Rhythm irregular.      Pulses: Normal pulses.      Heart sounds: Normal heart sounds. No murmur heard.     No friction rub. No gallop.   Pulmonary:      Effort: Pulmonary effort is normal. No respiratory distress.      Breath sounds: Normal breath sounds. No wheezing, rhonchi or rales.   Abdominal:      General: Abdomen is flat. Bowel sounds are normal. There is no distension.      Palpations: Abdomen is soft.      Tenderness: There is no abdominal tenderness. There is no guarding.      Hernia: No hernia is present.   Musculoskeletal:      Right lower leg: No edema.      Left lower leg: No edema.   Skin:     General: Skin is warm.      Capillary Refill: Capillary refill takes less than 2 seconds.   Neurological:      Mental Status: He is alert and oriented to person, place, and time.      Cranial Nerves: Cranial nerves 2-12 are intact.      Sensory: Sensory deficit (L foot decreased sensation) present.      Motor: Weakness (L hand  strength 4/5) present.      Comments: Blurry vision present. Patient endorses improvement             Lab Results:   Results from last 7 days   Lab Units 01/08/25  0458 01/07/25  1318   WBC Thousand/uL 10.57* 6.55   HEMOGLOBIN g/dL 16.1 17.2*   HEMATOCRIT % 44.8 49.1   PLATELETS Thousands/uL 185 193   SEGS PCT % 75 60   MONO PCT % 9 12   EOS PCT % 2 3      Results from last 7 days   Lab Units 01/08/25  0458 01/07/25  1318   SODIUM mmol/L 136 137   POTASSIUM mmol/L 4.2 4.1   CHLORIDE mmol/L 99 98   CO2 mmol/L 26 29   BUN mg/dL 16 15   CREATININE mg/dL 0.88 1.02   CALCIUM mg/dL 8.8 9.2   ALK PHOS U/L 52 57   ALT U/L 17 20   AST U/L 25 23   MAGNESIUM mg/dL 1.9  --    INR  1.01 0.93   PTT seconds 28 29   EGFR ml/min/1.73sq m 88 75     Results from last 7 days   Lab Units 01/08/25  0458 01/07/25  1318   INR  1.01 0.93   PTT seconds 28 29  "            No results found for: \"PHART\", \"JTB3WNJ\", \"PO2ART\", \"EBE6CQA\", \"H6RDEEFD\", \"BEART\", \"SOURCE\"  No components found for: \"HIV1X2\"  No results found for: \"HAV\", \"HEPAIGM\", \"HEPBIGM\", \"HEPBCAB\", \"HBEAG\", \"HEPCAB\"  No results found for: \"SPEP\", \"UPEP\"   Lab Results   Component Value Date    HGBA1C 5.3 01/08/2025    HGBA1C 5.0 08/22/2020     No results found for: \"CHOL\"   Lab Results   Component Value Date    HDL 47 01/07/2025    HDL 54 08/22/2020      Lab Results   Component Value Date    LDLCALC 54 01/07/2025    LDLCALC 48 08/22/2020      Lab Results   Component Value Date    TRIG 74 01/07/2025    TRIG 92 08/22/2020     No components found for: \"PROCAL\"          Imaging:     Chris Castellano PGY-1  Internal Medicine     "

## 2025-01-08 NOTE — CONSULTS
PHYSICAL MEDICINE AND REHABILITATION CONSULT NOTE  Jose Ramon Quiñonez Jr. 69 y.o. male MRN: [id number]  Unit/Bed#: [facility] 719-01 Encounter: [id number]    Requested by (Physician/Service): Leilani Gutierrez MD  Reason for Consultation:  Assessment of rehabilitation needs    Assessment:  Rehabilitation Diagnosis:   Multiple acute infarctions, right thalamus, right occipital lobe and right temporal lobe  Left sided ataxia   Left sided numbness  Chronic right cerebellar infarct   Impaired mobility and self care    Recommendations:  Rehabilitation Plan:  Continue PT/OT (SLP) while on acute care.  Patient reports fall while in the ER with immediate left shoulder pain and limited ROM. Patient at risk for rotator cuff arthropathy, bicep tear or fracture. Recommend x-ray and orthopedic consult.   Therapy is pending however anticipate that he will be a candidate for acute inpatient rehabilitation once medically stable.     Medical Co-morbidities Plan:  Atrial fibrillation previously on Eliquis   Hypertension   Hypothyroidism   OSB with hx of CPAP  Bowel plan: LBM unknown  Bladder plan: continent   DVT ppx: SCD    Thank you for this consultation.  Do not hesitate to contact service with further questions.      ROSALINA Davila  PM&R    I have spent a total time of 30 minutes on 01/08/25 in caring for this patient including Patient and family education, Counseling / Coordination of care, Documenting in the medical record, Reviewing / ordering tests, medicine, procedures  , Obtaining or reviewing history  , and Communicating with other healthcare professionals .    History of Present Illness:  Jose Ramon Quiñonez Jr. is a 69 y.o. male with a PMH of atrial fibrillation on Eliquis, uncontrolled hypertension, RAUDEL non-compliant with CPAP, HLD, prior cerebellar CVA who presented to the [facility] on 1/7/2025 with numbness left hand, foot and hand. He reported blurry vision as well. He had stopped his medications about  6 months ago. He stated that his doctor refused to refill them. CT head showed new small infarct in the right occipital lobe that could be acute. No acute hemorrhage. CTA showed proximal occlusion of the P2 segment of the right PCA. Stable 3 mm ACOM aneurysm. Moderate stenosis cervical left ICA progressed from prior study. 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 MRI showed recent infarctions in the right thalamus, right occipital lobe and right temporal lobe, correlating to some of the findings on yesterday's head CT. These discontiguous regions of acute/recent infarction are subtended by the right posterior cerebral artery, correlating to the P2 segment of the right posterior cerebral artery occlusion seen on yesterday's CTA. There is no acute hemorrhagic conversion. Stable appearance of old right cerebellar infarct. PM&R are consulted for rehabilitation recommendations.     The patient was seen in his room. He still has LUE numbness. He is reporting pain in his left should that started after he fell in the ER. He states he was trying to take off his jeans when he lost his balance and fell out of bed hitting the bed rail with his left shoulder/arm. He does have a history of rotator cuff tear and repair on that shoulder. He does report a hx of about 1/2 pack a day smoking, occasional pot use and drinks over 6 pack of beer daily. He currently denies any withdraw symptoms and last drink was on Friday.     Review of Systems: 10 point ROS negative except for what is noted in HPI    Function:  Prior level of function and living situation: The patient lives with his cousin in a 2 story home with 0 [address]. He was independent. He reports he recently got . He has two adult daughters and 2 granddaughters.       Current level of function:  Physical Therapy: Pending   Occupational Therapy: Pending       Physical Exam:  BP (!) 183/106   Pulse 85   Temp 98.1 °F (36.7 °C)  "(Oral)   Resp 18   Ht 5' 8\" (1.727 m)   SpO2 98%   BMI 25.85 kg/m²        Intake/Output Summary (Last 24 hours) at 1/8/2025 0906  Last data filed at 1/8/2025 0730  Gross per 24 hour   Intake 400 ml   Output 250 ml   Net 150 ml       Body mass index is 25.85 kg/m².      Physical Exam  Constitutional:       General: He is not in acute distress.     Appearance: He is not toxic-appearing.   HENT:      Head: Normocephalic and atraumatic.   Eyes:      Extraocular Movements: Extraocular movements intact.   Pulmonary:      Effort: Pulmonary effort is normal. No respiratory distress.   Abdominal:      General: There is no distension.   Musculoskeletal:      Right lower leg: No edema.      Left lower leg: No edema.      Comments: ROM limited left shoulder due to pain, patient reports he fell while in there ER   Neurological:      Mental Status: He is alert and oriented to person, place, and time.      Comments: Left sided ataxia on FTN and heel to shin, decreased sensation to touch LUE    Psychiatric:         Mood and Affect: Mood normal.        Social History:    Social History     Socioeconomic History    Marital status: /Civil Union     Spouse name: Not on file    Number of children: Not on file    Years of education: Not on file    Highest education level: Not on file   Occupational History    Not on file   Tobacco Use    Smoking status: Every Day     Current packs/day: 0.50     Types: Cigarettes    Smokeless tobacco: Never   Vaping Use    Vaping status: Never Used   Substance and Sexual Activity    Alcohol use: Yes    Drug use: Not Currently    Sexual activity: Not on file   Other Topics Concern    Not on file   Social History Narrative    Not on file     Social Drivers of Health     Financial Resource Strain: Not on file   Food Insecurity: Not on file   Transportation Needs: Not on file   Physical Activity: Not on file   Stress: Not on file   Social Connections: Not on file   Intimate Partner Violence: Not on " file   Housing Stability: Not on file        Family History:    Family History   Problem Relation Age of Onset    No Known Problems Mother     Diabetes Father          Medications:     Current Facility-Administered Medications:     acetaminophen (TYLENOL) tablet 650 mg, 650 mg, Oral, Q6H PRN, Teja Lowry DO    aspirin chewable tablet 81 mg, 81 mg, Oral, Daily, Teja Lowry DO, 81 mg at 01/08/25 0824    atorvastatin (LIPITOR) tablet 40 mg, 40 mg, Oral, Daily With Dinner, Teja Lowry DO, 40 mg at 01/07/25 2303    hydrALAZINE (APRESOLINE) injection 10 mg, 10 mg, Intravenous, Q6H PRN, Leilani Gutierrez MD    levothyroxine tablet 75 mcg, 75 mcg, Oral, Early Morning, Teja Lowry DO, 75 mcg at 01/08/25 0528    lisinopril (ZESTRIL) tablet 10 mg, 10 mg, Oral, Daily, Teja Lowry DO, 10 mg at 01/08/25 0824    metoprolol tartrate (LOPRESSOR) tablet 25 mg, 25 mg, Oral, Q12H MALGORZATA, Teja Lowry DO, 25 mg at 01/08/25 0824    nicotine (NICODERM CQ) 21 mg/24 hr TD 24 hr patch 1 patch, 1 patch, Transdermal, Daily, Teja Lowry DO    Past Medical History:     Past Medical History:   Diagnosis Date    A-fib (HCC)     Disease of thyroid gland     Hypertension     Stroke (HCC)         Past Surgical History:     Past Surgical History:   Procedure Laterality Date    ROTATOR CUFF REPAIR           Allergies:     No Known Allergies        LABORATORY RESULTS:      Lab Results   Component Value Date    HGB 16.1 01/08/2025    HGB 15.4 05/02/2015    HCT 44.8 01/08/2025    HCT 42.7 05/02/2015    WBC 10.57 (H) 01/08/2025    WBC 5.26 05/02/2015     Lab Results   Component Value Date    BUN 16 01/08/2025    BUN 10 05/02/2015     05/02/2015    K 4.2 01/08/2025    K 3.8 05/02/2015    CL 99 01/08/2025     05/02/2015    GLUCOSE 81 05/02/2015    CREATININE 0.88 01/08/2025    CREATININE 0.62 05/02/2015     Lab Results   Component Value Date    PROTIME 13.6 01/08/2025    PROTIME 13.3 05/01/2015    INR 1.01 01/08/2025    INR 1.03  05/01/2015        DIAGNOSTIC STUDIES: Reviewed  CTA head and neck with and without contrast  Result Date: 1/7/2025  Impression: CT Brain: New small infarct in the right occipital lobe that could be acute. No acute hemorrhage. CT Angiography: Proximal occlusion of the P2 segment of the right PCA. Stable 3 mm ACOM aneurysm. Moderate stenosis cervical left ICA progressed from prior study. 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 I personally discussed this study with RAMIN BLANTON on 1/7/2025 6:56 PM. Workstation performed: [id number]

## 2025-01-08 NOTE — ASSESSMENT & PLAN NOTE
"Presents with visual disturbance, LUE numbness/weakness, dysarthria, gait disturbance starting 4 days ago, felt dizzy today prompting ED visit. Pt has hx of embolic R cerebellum stroke.   Multiple stroke risk factors including afib not on AC, untreated HTN due to noncompliance with meds, prior history of embolic stroke R cerebellar  CT brain: \"New small infarct in the right occipital lobe that could be acute. No acute hemorrhage.\"   CTA: \"Proximal occlusion of the P2 segment of the right PCA. Stable 3 mm ACOM aneurysm. Moderate stenosis cervical left ICA progressed from prior study. 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 These discontiguous regions of acute/recent infarction are subtended by the right posterior cerebral   artery, correlating to the P2 segment of the right posterior cerebral artery occlusion seen on yesterday's CTA. There is no acute hemorrhagic conversion. Mild chronic microangiopathic changes. Stable appearance of old right cerebellar infarct.\"   TTE: 65% EF, LA mildly dilated  Neurology following, appreciate recommendations  If acute changes in exam, please obtain stat CT head and notify neurology team   Strict NPO prior to dysphagia screen - passed RN dysphagia screen  Continue telemetry monitoring  Antiplatelet agents: ASA 81 mg following load, plavix loaded but dc  Anticoagulation: Hold until 1/10/25, then may restart AC  Continue ASA 81 mg daily, atorvastatin 40 mg daily   Neuro checks, stroke pathway  Stroke education and counseling   CPAP rec if tolerated   Goal normotension SBP<130/80  Risk factor management   HTN, HLD, Atrial Fibrillation, Prior Stroke/TIA, and RAUDEL   Out of the therapeutic window and not a candidate for thrombolysis   He has been planning to see a new " primary care physician and as a result is out of his medications. Possible uncontrolled risk factors is likely the cause of his stroke   PT/OT/Speech eval pending, appreciate recommendations

## 2025-01-08 NOTE — ASSESSMENT & PLAN NOTE
69 y.o. male with pertinent PMH of A-fib not compliant on Eliquis, uncontrolled hypertension, hyperlipidemia, RAUDEL noncompliant on CPAP, prior R cerebellar stroke approximately 3 years ago per patient report. Pt presenting with stroke sx of left upper extremity weakness and numbness, gait imbalance, LFD, dysarthria, lack of coordination, and episodes of waxing and waning blurry vision bilaterally. LKW several days ago. Initial BP Blood Pressure: (!) 231/124, FSBG POC Glucose: 94. NC CTH completed and CTA H/N performed, results as noted below. As a result of > 4.5 hours from time of symptom onset and Unclear time of onset outside appropriate time window patient was not determined to be a candidate for thrombolysis (TNK).     - Home Antiplatelet /Anticoagulants PTA: No antiplatelet or anticoagulants  - Stroke risk factors: HTN, HLD, Atrial Fibrillation, Prior Stroke/TIA, and RAUDEL  - Prior Stroke Hx: yes: embolic right cerebellum    Workup:  - CTH: New small infarct in the right occipital lobe that could be acute. No acute hemorrhage.   - CTA: Proximal occlusion of the P2 segment of the right PCA. Stable 3 mm ACOM aneurysm. Moderate stenosis cervical left ICA progressed from prior study. 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  - MRI: Recent infarctions of R thalamus, R occipital lobe and R temporal lobe.  Stable old R cerebellar infarct  - TTE: 65% EF, LA mildly dilated.  - Labs: Hemoglobin A1c 5.3 (1/8/2025), LDL 54 (1/7/2025)    Impression: 69 y.o. male with history of many uncontrolled stroke risk factors after removing himself from all medications approximately 6 months ago, currently on no AC for active A-fib, who presented with many symptoms as noted above concerning for stroke, with findings on CTh and CTA HN showing a new infarct to the right occipital lobe appearing recent, suspect patient likely had CVA from medication noncompliance, etiology from cardioembolic  or atheroembolic origin    Plan: Discussed plan with neurology attending, Dr. Garland  Admit along the stroke pathway with telemetry monitoring  Strict NPO prior to dysphagia screen   Frequent neuro checks per protocol.   If acute changes in exam, please obtain stat CT head and notify neurology team  BP Goals: Normotension of <130/80  Antiplatelet agents: ASA 81 mg following load, plavix loaded but dc  Anticoagulation: Hold until 1/10/25, then may restart AC  Statin: Start Atorvastatin 40 mg QHS  TTE, pending  Euthermic/Euglycemic  DVT PPx: per primary team, SCDs  PT/OT/ST/PMR evaluations when able  Stroke education and counseling  CPAP rec if tolerated  Rest of other care per primary team appreciated    Disposition: PT Recommendations -

## 2025-01-08 NOTE — ASSESSMENT & PLAN NOTE
Presents with visual disturbance, LUE numbness/weakness, dysarthria, gait disturbance starting 4 days ago, felt dizzy today prompting ED visit  Multiple stroke risk factors including afib not on AC, untreated HTN due to noncompliance with meds, prior history of embolic stroke R cerebellar  CTH:New small infarct in the right occipital lobe that could be acute. No acute hemorrhage.   CTA: Proximal occlusion of the P2 segment of the right PCA. Stable 3 mm ACOM aneurysm. Moderate stenosis cervical left ICA progressed from prior study. 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   Neurology following  MRI brain pending  TTE pending, telemetry monitoring  ASA, statin  Neuro checks, stroke pathway  Goal normotension SBP<130/80

## 2025-01-08 NOTE — CASE MANAGEMENT
Case Management Progress Note    Patient name Jose Ramon Quiñonez Jr.  Location [facility] 719/[facility] 719-01 MRN [id number]  : 1955 Date 2025       LOS (days): 1  Geometric Mean LOS (GMLOS) (days): 1.9  Days to GMLOS:1.1        OBJECTIVE:        Current admission status: Inpatient  Preferred Pharmacy:   [id number] - [place] - [address]  [address]  [place] [address]  Phone: [phone] Fax: [phone]    [facility] - [place] - [address]  [address]  [place] [address]  Phone: [phone] Fax: [phone]    [id number] - [place] - [address]  [address]  [place] [address]  Phone: [phone] Fax: [phone]    Primary Care Provider: Margarito Grant MD    Primary Insurance: MEDICARE  Secondary Insurance:     PROGRESS NOTE:  CM attempted to open pt x3

## 2025-01-08 NOTE — ASSESSMENT & PLAN NOTE
Lab Results   Component Value Date    OGM1KFPFNUJD 10.922 (H) 01/08/2025    JGM1VDRCXDTK 10.600 (H) 08/19/2020    VES0PKGPLMCA 0.548 (L) 05/02/2015    FREET4 0.52 (L) 01/08/2025    FREET4 0.61 (L) 08/19/2020    FREET4 0.54 (L) 02/14/2020    TSH 12.00 (H) 02/14/2020   TSH noted to be elevated with decreased Free T4 on admission.   Continue levothyroxine 75 mcg daily

## 2025-01-08 NOTE — ASSESSMENT & PLAN NOTE
Pt reports nicotine dependence   0.5 packs/day every day cigarette smoker  Nicotine patch ordered  Promote smoke cessation

## 2025-01-08 NOTE — ASSESSMENT & PLAN NOTE
Lab Results   Component Value Date    CHOLESTEROL 116 01/07/2025    CHOLESTEROL 120 08/22/2020    TRIG 74 01/07/2025    TRIG 92 08/22/2020    HDL 47 01/07/2025    HDL 54 08/22/2020    LDLCALC 54 01/07/2025    LDLCALC 48 08/22/2020

## 2025-01-08 NOTE — ASSESSMENT & PLAN NOTE
Pt reports psychosocial stressor of recent divorce.  Pt at risk for depression  If available in acute setting, recommend cognitive therapy   At risk for increased confusion/delirium, restlessness, agitation, and fall - continue to monitor   Monitor neuro-exam, wakefulness, mood, cognition, insight into deficits and safety awareness   Overstimulation precautions, frequent re-orientation, re-direction, re-assurance  Optimal mood, pain, and sleep management  Plan for possible ARC/IRF disposition

## 2025-01-08 NOTE — QUICK NOTE
Patient seen and examined by the overnight resident for stroke. Preliminary recommendations are as outlined below. Formal consultation to follow in the morning.      Neurology Note  Name: Jose Ramon Quiñonez Jr. 69 y.o. male I MRN: [id number]  Unit/Bed#: ED 25 I Date of Admission: 1/7/2025   Date of Service: 1/7/2025 I Hospital Day: 0   Consult to neurology  Consult performed by: Dylan Riley DO  Consult ordered by: Heber Gore MD        Physician Requesting Evaluation: Heber Gore MD   Reason for Evaluation / Principal Problem: new stroke on imaging / sxs    Assessment & Plan  CVA (cerebral vascular accident) (HCC)  69 y.o. male with pertinent PMH of A-fib not compliant on Eliquis, uncontrolled hypertension, hyperlipidemia, RAUDEL noncompliant on CPAP, prior cerebellar stroke approximately 3 years ago per patient report. Stroke alert activated on 1/7/25 at 1247 for sxs concerning for stroke consisting of left upper extremity weakness and numbness, gait imbalance, LFD, dysarthria, lack of coordination, and episodes of waxing and waning blurry vision bilaterally. LKW several days ago. Initial BP Blood Pressure: (!) 231/124, FSBG POC Glucose: 94. NC CTH completed and CTA H/N performed, results as noted below. As a result of > 4.5 hours from time of symptom onset and Unclear time of onset outside appropriate time window patient was not determined to be a candidate for thrombolysis (TNK). Given absence of IR target pt was deemed not a candidate for mechanical thrombectomy.   - Home Antiplatelet /Anticoagulants PTA: No antiplatelet or anticoagulants  - Stroke risk factors: HTN, HLD, Atrial Fibrillation, Prior Stroke/TIA, and RAUDEL  - Prior Stroke Hx: yes: embolic right cerebellum    Workup:  - CTH: New small infarct in the right occipital lobe that could be acute. No acute hemorrhage.   - CTA: Proximal occlusion of the P2 segment of the right PCA. Stable 3 mm ACOM aneurysm. Moderate stenosis cervical left ICA  progressed from prior study. 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  - MRI: Pending  - TTE: Pending  - Labs: Hemoglobin A1c 5.0 (8/22/2020), LDL 48 (8/22/2020)    Impression: 69 y.o. male with history of many uncontrolled stroke risk factors after removing himself from all medications approximately 6 months ago, currently on no AC for active A-fib, who presented with many symptoms as noted above concerning for stroke, with findings on CTh and CTA HN showing a new infarct to the right occipital lobe appearing recent, suspect patient has at least had this recent CVA although must also rule out other toxic or metabolic etiologies which could be contributing to presentation.    Plan: Discussed plan with neurology attending, Dr. Lundy  Admit along the stroke pathway with telemetry monitoring  Strict NPO prior to dysphagia screen   Frequent neuro checks per protocol.   If acute changes in exam, please obtain stat CT head and notify neurology team  BP Goals: Normotension of <130/80  Antiplatelet agents: ASA 81 mg following load, plavix loaded but dc  Anticoagulation: Hold for now  Statin: Start Atorvastatin 40 mg QHS  Labs: Hgb A1c, LDL   Brain Imaging: Obtain MRI brain without contrast  TTE, pending  Euthermic/Euglycemic  DVT PPx: per primary team, SCDs  PT/OT/ST/PMR evaluations when able  Stroke education and counseling  CPAP rec if tolerated  Rest of other care per primary team appreciated    Disposition: PT Recommendations -      I have discussed with Dr. Lundy the above plan to treat pt. He agrees with the plan.  Please contact the SecureChat role for the Neurology service with any questions/concerns.    Recommendations for outpatient neurological follow up have yet to be determined.    History of Present Illness   Jose Ramon Quiñonez JrNicolas is a 69 y.o. male who presents with many neurologic complaints with earliest known neurologic symptom of blurry vision bilaterally  starting several days ago following a waxing and waning pattern to date, followed by fluctuating levels of numbness/possible weakness to distal > proximal LUE over past couple days, followed by gait instability >dizziness beginning day prior to arrival, the difficulty of gait being the reason to seek out medical care.  Patient reports that prior to these past few days, had not been having any neurologic symptoms in years, reports that prior stroke was approximately 3 years ago and no residual deficits from that continue.  Patient has significant past medical history as noted above which includes noncompliance on statin, antihypertensives, and Eliquis AC for with the related medical conditions, notes that supposedly the family doctor stopped ordering the medications when patient refused to continue with appointments for years.  Patient states last time he took any of these medications was approximately 6 months ago.  Patient is actively in A-fib in the ER and CT findings already reveal recent stroke.  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    Review of Systems   Constitutional:  Positive for activity change. Negative for appetite change, chills, diaphoresis, fatigue, fever and unexpected weight change.   Eyes:  Positive for visual disturbance. Negative for photophobia, pain, discharge, redness and itching.   Neurological:  Positive for dizziness, facial asymmetry, speech difficulty, weakness and numbness. Negative for tremors, seizures, syncope, light-headedness and headaches.   Psychiatric/Behavioral:  Positive for confusion and decreased concentration. Negative for agitation, behavioral problems, dysphoric mood, hallucinations, self-injury, sleep disturbance and suicidal ideas. The patient is not nervous/anxious and is not hyperactive.         I have reviewed the patient's PMH, PSH, Social  History, Family History, Meds, and Allergies  Historical Information   Past Medical History:   Diagnosis Date    A-fib (HCC)     Disease of thyroid gland     Hypertension     Stroke (HCC)      Past Surgical History:   Procedure Laterality Date    ROTATOR CUFF REPAIR       Social History     Tobacco Use    Smoking status: Every Day     Current packs/day: 0.50     Types: Cigarettes    Smokeless tobacco: Never   Vaping Use    Vaping status: Never Used   Substance and Sexual Activity    Alcohol use: Yes    Drug use: Not Currently    Sexual activity: Not on file     E-Cigarette/Vaping    E-Cigarette Use Never User      E-Cigarette/Vaping Substances     Family History   Problem Relation Age of Onset    No Known Problems Mother     Diabetes Father      Social History     Tobacco Use    Smoking status: Every Day     Current packs/day: 0.50     Types: Cigarettes    Smokeless tobacco: Never   Vaping Use    Vaping status: Never Used   Substance and Sexual Activity    Alcohol use: Yes    Drug use: Not Currently    Sexual activity: Not on file     No current facility-administered medications for this encounter.  Prior to Admission Medications   Prescriptions Last Dose Informant Patient Reported? Taking?   acetaminophen (TYLENOL) 325 mg tablet   No No   Sig: Take 2 tablets (650 mg total) by mouth every 6 (six) hours as needed for mild pain or headaches   Patient not taking: Reported on 9/10/2020   aspirin 81 mg chewable tablet   No No   Sig: Chew 1 tablet (81 mg total) daily   atorvastatin (LIPITOR) 20 mg tablet   No No   Sig: Take 1 tablet (20 mg total) by mouth daily with dinner   levothyroxine 75 mcg tablet   No No   Sig: Take 1 tablet (75 mcg total) by mouth daily in the early morning   lisinopril (ZESTRIL) 10 mg tablet   No No   Sig: Take 1 tablet (10 mg total) by mouth daily   metoprolol tartrate (LOPRESSOR) 25 mg tablet   No No   Sig: Take 1 tablet (25 mg total) by mouth every 12 (twelve) hours      Facility-Administered  Medications: None     Patient has no known allergies.    Objective :  Temp:  [98 °F (36.7 °C)] 98 °F (36.7 °C)  HR:  [] 82  BP: (163-231)/() 163/97  Resp:  [11-32] 16  SpO2:  [96 %-100 %] 99 %  O2 Device: None (Room air)    Physical Exam  Vitals and nursing note reviewed.   Constitutional:       General: He is not in acute distress.     Appearance: He is not diaphoretic.   HENT:      Head: Normocephalic and atraumatic.      Right Ear: External ear normal.      Left Ear: External ear normal.      Nose: Nose normal.      Mouth/Throat:      Pharynx: Oropharynx is clear.   Eyes:      General: Lids are normal. No scleral icterus.        Right eye: No discharge.         Left eye: No discharge.      Extraocular Movements: Extraocular movements intact.      Conjunctiva/sclera: Conjunctivae normal.      Pupils: Pupils are equal, round, and reactive to light.   Neurological:      Mental Status: He is alert.      Cranial Nerves: Dysarthria present. No cranial nerve deficit.      Coordination: Coordination normal.      Gait: Gait normal.      Deep Tendon Reflexes: Reflexes normal.     Neurological Exam  Mental Status  Alert. Oriented to person, place and time. Mild dysarthria present. Follows two-step commands.    Cranial Nerves  CN I: Sense of smell is normal.  CN II: Visual acuity is normal. Visual fields full to confrontation.  CN III, IV, VI: Extraocular movements intact bilaterally. Normal lids and orbits bilaterally. Pupils equal round and reactive to light bilaterally.  CN V: Facial sensation is normal.  CN VII:  Right: There is no facial weakness.  Left: There is central facial weakness.    Motor  Normal muscle bulk throughout. No fasciculations present. Normal muscle tone. No abnormal involuntary movements. Strength is 5/5 in all four extremities except as noted. Left hemiparesis.  Left upper extremity deficits to strength distal > proximal w/ 4- to 4+ throughout, left lower extremity distally mild  reduction to strength against resistance, mild LFD..    Sensory  Pinprick abnormality: Proprioception abnormality: Left hemisensory loss.   Left-sided deficits to both light touch and pain w/ diminished sensation.    Reflexes  Right Plantar: equivocal  Left Plantar: equivocal    Coordination  Right: Finger-to-nose normal. Heel-to-shin normal.Left: Finger-to-nose abnormality: Heel-to-shin abnormality:  Left FTN seems limited by strength, left HTS notable ataxia.    Gait   Normal gait.Casual gait: Ataxic gait.        Lab Results: I have reviewed the following results:CBC:   Results from last 7 days   Lab Units 01/07/25  1318   WBC Thousand/uL 6.55   RBC Million/uL 4.94   HEMOGLOBIN g/dL 17.2*   HEMATOCRIT % 49.1   MCV fL 99*   PLATELETS Thousands/uL 193   , Coagulation:   Results from last 7 days   Lab Units 01/07/25  1318   INR  0.93     Recent Labs     01/07/25  1318   WBC 6.55   HGB 17.2*   HCT 49.1      SODIUM 137   K 4.1   CL 98   CO2 29   BUN 15   CREATININE 1.02   GLUC 89     Imaging Results Review: I personally reviewed the following image studies in PACS and associated radiology reports: CT head. My interpretation of the radiology images/reports is: Agree w/ above.  Other Study Results Review: EKG was reviewed.

## 2025-01-08 NOTE — SPEECH THERAPY NOTE
Consult received and chart reviewed. SLP s/w patient and RN. Pt passed RN dysphagia assessment and is tolerating regular diet with thin liquids with no s/s of aspiration. Speech/Language deficits also denied. Formal speech/swallow evaluation does not appear to be indicated at this time. If new concerns arise, please reconsult. Thank you.      Patient admitted with some dysarthria, which he reports improvement with. Speech is 100% intelligible during conversation. He had speech therapy after previous CVA for some dysarthria, but reports is back to baseline.

## 2025-01-08 NOTE — PLAN OF CARE
Problem: SAFETY ADULT  Goal: Patient will remain free of falls  Description: INTERVENTIONS:  - Educate patient/family on patient safety including physical limitations  - Instruct patient to call for assistance with activity   - Consult OT/PT to assist with strengthening/mobility   - Keep Call bell within reach  - Keep bed low and locked with side rails adjusted as appropriate  - Keep care items and personal belongings within reach  - Initiate and maintain comfort rounds  - Make Fall Risk Sign visible to staff  - Offer Toileting every 2 Hours, in advance of need  - Initiate/Maintain bed alarm  - Obtain necessary fall risk management equipment: non skid socks  - Apply yellow socks and bracelet for high fall risk patients  - Consider moving patient to room near nurses station  Outcome: Progressing     Problem: SAFETY ADULT  Goal: Maintain or return to baseline ADL function  Description: INTERVENTIONS:  -  Assess patient's ability to carry out ADLs; assess patient's baseline for ADL function and identify physical deficits which impact ability to perform ADLs (bathing, care of mouth/teeth, toileting, grooming, dressing, etc.)  - Assess/evaluate cause of self-care deficits   - Assess range of motion  - Assess patient's mobility; develop plan if impaired  - Assess patient's need for assistive devices and provide as appropriate  - Encourage maximum independence but intervene and supervise when necessary  - Involve family in performance of ADLs  - Assess for home care needs following discharge   - Consider OT consult to assist with ADL evaluation and planning for discharge  - Provide patient education as appropriate  Outcome: Progressing     Problem: SAFETY ADULT  Goal: Maintains/Returns to pre admission functional level  Description: INTERVENTIONS:  - Perform AM-PAC 6 Click Basic Mobility/ Daily Activity assessment daily.  - Set and communicate daily mobility goal to care team and patient/family/caregiver.   - Collaborate  with rehabilitation services on mobility goals if consulted  - Perform Range of Motion 3 times a day.  - Reposition patient every 2 hours.  - Dangle patient 3 times a day  - Stand patient 3 times a day  - Ambulate patient 3 times a day  - Out of bed to chair 3 times a day   - Out of bed for meals 3 times a day  - Out of bed for toileting  - Record patient progress and toleration of activity level   Outcome: Progressing

## 2025-01-08 NOTE — ASSESSMENT & PLAN NOTE
Lab Results   Component Value Date    WBC 10.57 (H) 01/08/2025    WBC 6.55 01/07/2025    WBC 6.09 08/24/2020   Elevated WBC noted on 1/8/25  No fevers noted during this admission  Procalcitonin labs in A.M.  Continue to monitor CBC  Continue to monitor VS, temp, symptoms

## 2025-01-08 NOTE — ASSESSMENT & PLAN NOTE
Per patient report PCP took him off Eliquis but cannot find documentation of this, appears he was instructed to follow up with cardiology regarding restarting Eliquis.  Currently rate controlled  Continue metoprolol 25 mg Q12H  Neurology recommending holding of anticoagulation overnight and as such have held Eliquis  Continue telemetry monitoring.

## 2025-01-08 NOTE — ASSESSMENT & PLAN NOTE
Rate controlled  Continue metoprolol  Per patient report PCP took him off Eliquis but cannot find documentation of this, appears he was instructed to follow up with cardiology regarding restarting Eliquis  Neurology recommending holding of anticoagulation overnight and as such have held Eliquis

## 2025-01-08 NOTE — ASSESSMENT & PLAN NOTE
BP on admission noted at 231/124, current BP: 158/100  Pt noted with persistently elevated BP  Continue metoprolol 25 mg Q12H  Trend Bps  Start lisinopril 10 mg for persistently elevated BP above goal  Pt received labetalol 10 mg IV x 3 doses - pt continues w/ elevated BP   Start Hydralazine 10 mg Q6H for SBP > 180 & DBP > 95 with hold parameters for SBP < 110  Cardiology consulted, appreciate recommendations

## 2025-01-08 NOTE — ASSESSMENT & PLAN NOTE
"CTA head & neck w/ & w/o contrast: \"CT Brain: New small infarct in the right occipital lobe that could be acute. No acute hemorrhage. CT Angiography: Proximal occlusion of the P2 segment of the right PCA. Stable 3 mm ACOM aneurysm. Moderate stenosis cervical left ICA progressed from prior study.  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  "

## 2025-01-08 NOTE — ED NOTES
Pt put on the monitor and vitals taken. Admitting doctor messaged about consistently high blood pressures over night.      Donya Awad RN  01/08/25 8712

## 2025-01-08 NOTE — H&P
H&P - Hospitalist   Name: Jose Ramon Quiñonez Jr. 69 y.o. male I MRN: [id number]  Unit/Bed#: ED 25 I Date of Admission: 1/7/2025   Date of Service: 1/8/2025 I Hospital Day: 1     Assessment & Plan  CVA (cerebral vascular accident) (HCC)  Presents with visual disturbance, LUE numbness/weakness, dysarthria, gait disturbance starting 4 days ago, felt dizzy today prompting ED visit  Multiple stroke risk factors including afib not on AC, untreated HTN due to noncompliance with meds, prior history of embolic stroke R cerebellar  CTH:New small infarct in the right occipital lobe that could be acute. No acute hemorrhage.   CTA: Proximal occlusion of the P2 segment of the right PCA. Stable 3 mm ACOM aneurysm. Moderate stenosis cervical left ICA progressed from prior study. 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   Neurology following  MRI brain pending  TTE pending, telemetry monitoring  ASA, statin  Neuro checks, stroke pathway  Goal normotension SBP<130/80  Longstanding persistent atrial fibrillation (HCC)  Rate controlled  Continue metoprolol  Per patient report PCP took him off Eliquis but cannot find documentation of this, appears he was instructed to follow up with cardiology regarding restarting Eliquis  Neurology recommending holding of anticoagulation overnight and as such have held Eliquis  HTN (hypertension)  Restart metoprolol  Trend BPs, restart lisinopril if persistently above goal  Hypothyroid  Continue levothyroxine  RAUDEL (obstructive sleep apnea)  CPAP HS        Code Status: Level 1 - Full Code       Anticipated Length of Stay: Patient will be admitted on an inpatient basis with an anticipated length of stay of greater than 2 midnights secondary to stroke.    History of Present Illness   Chief Complaint: Vision changes, numbness on the left side    Jose Ramon Quiñonez Jr. is a 69 y.o. male with a PMH of A-fib noncompliant with Eliquis, uncontrolled hypertension, RAUDEL  noncompliant with CPAP, HLD, prior cerebellar CVA who presents with several neurologic abnormalities which she reports began this past Friday.  He reported on my exam that he noticed some numbness in his left hand on Friday and Saturday which she reports seem to come and go intermittently.  He then developed sensation of numbness in the left foot.  He additionally reports on Saturday he began to have episodes of blurry vision.  Patient reports that the symptoms became more intense over the next several days prompting him to come to the ER today.  He reported that he stopped taking his medications about 6 months ago because he reported that his doctor refused to refill them then.  Patient on review of chart had been on Eliquis, levothyroxine, lisinopril, metoprolol, atorvastatin.  He reports he has not taken any of these several months.    Patient denies any chest pain or shortness of breath on my exam.    Review of Systems   Eyes:         Blurry vision   Neurological:  Positive for numbness.   All other systems reviewed and are negative.      Historical Information   Past Medical History:   Diagnosis Date    A-fib (HCC)     Disease of thyroid gland     Hypertension     Stroke (HCC)      Past Surgical History:   Procedure Laterality Date    ROTATOR CUFF REPAIR       Social History     Tobacco Use    Smoking status: Every Day     Current packs/day: 0.50     Types: Cigarettes    Smokeless tobacco: Never   Vaping Use    Vaping status: Never Used   Substance and Sexual Activity    Alcohol use: Yes    Drug use: Not Currently    Sexual activity: Not on file     E-Cigarette/Vaping    E-Cigarette Use Never User      E-Cigarette/Vaping Substances     Family history non-contributory  Social History:  Marital Status: /Civil Union     Meds/Allergies   I have reviewed home medications using recent Epic encounter.  Prior to Admission medications    Medication Sig Start Date End Date Taking? Authorizing Provider    acetaminophen (TYLENOL) 325 mg tablet Take 2 tablets (650 mg total) by mouth every 6 (six) hours as needed for mild pain or headaches  Patient not taking: Reported on 9/10/2020 8/24/20   Janet Chaidez MD   aspirin 81 mg chewable tablet Chew 1 tablet (81 mg total) daily 8/25/20   Janet Chaidez MD   atorvastatin (LIPITOR) 20 mg tablet Take 1 tablet (20 mg total) by mouth daily with dinner 8/24/20   Janet Chaidez MD   levothyroxine 75 mcg tablet Take 1 tablet (75 mcg total) by mouth daily in the early morning 8/24/20   Janet Chaidez MD   lisinopril (ZESTRIL) 10 mg tablet Take 1 tablet (10 mg total) by mouth daily 8/24/20   Janet Chaidez MD   metoprolol tartrate (LOPRESSOR) 25 mg tablet Take 1 tablet (25 mg total) by mouth every 12 (twelve) hours 8/24/20   Janet Chaidez MD     No Known Allergies    Objective :  Temp:  [98 °F (36.7 °C)] 98 °F (36.7 °C)  HR:  [] 80  BP: (163-231)/() 178/84  Resp:  [11-32] 20  SpO2:  [96 %-100 %] 99 %  O2 Device: None (Room air)    Physical Exam  Constitutional:       Appearance: He is ill-appearing.   HENT:      Right Ear: External ear normal.      Left Ear: External ear normal.      Nose: No congestion.      Mouth/Throat:      Pharynx: Oropharynx is clear.   Eyes:      Extraocular Movements: Extraocular movements intact.      Pupils: Pupils are equal, round, and reactive to light.   Cardiovascular:      Rate and Rhythm: Normal rate.      Heart sounds: No murmur heard.  Pulmonary:      Effort: No respiratory distress.   Abdominal:      General: There is no distension.   Musculoskeletal:      Right lower leg: No edema.      Left lower leg: No edema.   Skin:     Capillary Refill: Capillary refill takes less than 2 seconds.   Neurological:      Mental Status: He is alert.      Comments: Endorses numbness sensation in the left hand as well as left foot and blurry vision                Lab Results: I have reviewed the following results:  Results from last  7 days   Lab Units 01/08/25  0458   WBC Thousand/uL 10.57*   HEMOGLOBIN g/dL 16.1   HEMATOCRIT % 44.8   PLATELETS Thousands/uL 185   SEGS PCT % 75   LYMPHO PCT % 13*   MONO PCT % 9   EOS PCT % 2     Results from last 7 days   Lab Units 01/07/25  1318   SODIUM mmol/L 137   POTASSIUM mmol/L 4.1   CHLORIDE mmol/L 98   CO2 mmol/L 29   BUN mg/dL 15   CREATININE mg/dL 1.02   ANION GAP mmol/L 10   CALCIUM mg/dL 9.2   ALBUMIN g/dL 4.0   TOTAL BILIRUBIN mg/dL 0.78   ALK PHOS U/L 57   ALT U/L 20   AST U/L 23   GLUCOSE RANDOM mg/dL 89     Results from last 7 days   Lab Units 01/07/25  1318   INR  0.93     Results from last 7 days   Lab Units 01/07/25  1305   POC GLUCOSE mg/dl 94     Lab Results   Component Value Date    HGBA1C 5.3 01/08/2025    HGBA1C 5.0 08/22/2020           Imaging Results Review: I reviewed radiology reports from this admission including: CT head.  Other Study Results Review: EKG was reviewed.     Administrative Statements   I have spent a total time of 75 minutes in caring for this patient on the day of the visit/encounter including Diagnostic results, Prognosis, and Risks and benefits of tx options.    ** Please Note: This note has been constructed using a voice recognition system. **

## 2025-01-08 NOTE — ASSESSMENT & PLAN NOTE
Per pt - had a hx of L TSA but had excellent recovery.  Patient reports falling in ED during this admission, resulting in new pain and decreased ROM  +leila Lang, ++Speeds, some pain/weakness on resisted ER>IR  Risk of RTC tear/partial tear, bicipital tendon tear  As above

## 2025-01-08 NOTE — ASSESSMENT & PLAN NOTE
"Pt reports that he fell and hurt his L shoulder in ED while taking off his pants. There is no documentation of his fall. Pt had an old total shoulder arthroplasty (TSA), which was doing good but since the fall has had bad shoulder pain and decreased ROM unlikely d/t his stroke. May have torn/partially torn rotator cuff or bicipital tendon or both.   Left shoulder xray 2+ vw: \"Acute nondisplaced fracture of the greater tuberosity of the humeral head.\"   Ortho consulted, appreciate recommendations  PT/OT consulted, appreciate recommendations  Fall precautions  "

## 2025-01-08 NOTE — PROGRESS NOTES
"Progress Note - Hospitalist   Name: Jose Ramon Quiñonez Jr. 69 y.o. male I MRN: [id number]  Unit/Bed#: [facility] 719-01 I Date of Admission: 1/7/2025   Date of Service: 1/8/2025 I Hospital Day: 1    Assessment & Plan  CVA (cerebral vascular accident) (HCC)  Presents with visual disturbance, LUE numbness/weakness, dysarthria, gait disturbance starting 4 days ago, felt dizzy today prompting ED visit. Pt has hx of embolic R cerebellum stroke.   Multiple stroke risk factors including afib not on AC, untreated HTN due to noncompliance with meds, prior history of embolic stroke R cerebellar  CT brain: \"New small infarct in the right occipital lobe that could be acute. No acute hemorrhage.\"   CTA: \"Proximal occlusion of the P2 segment of the right PCA. Stable 3 mm ACOM aneurysm. Moderate stenosis cervical left ICA progressed from prior study. 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 These discontiguous regions of acute/recent infarction are subtended by the right posterior cerebral   artery, correlating to the P2 segment of the right posterior cerebral artery occlusion seen on yesterday's CTA. There is no acute hemorrhagic conversion. Mild chronic microangiopathic changes. Stable appearance of old right cerebellar infarct.\"   TTE: 65% EF, LA mildly dilated  Neurology following, appreciate recommendations  If acute changes in exam, please obtain stat CT head and notify neurology team   Strict NPO prior to dysphagia screen - passed RN dysphagia screen  Continue telemetry monitoring  Antiplatelet agents: ASA 81 mg following load, plavix loaded but dc  Anticoagulation: Hold until 1/10/25, then may restart AC  Continue ASA 81 mg daily, atorvastatin 40 mg daily   Neuro checks, stroke pathway  Stroke education and " "counseling   CPAP rec if tolerated   Goal normotension SBP<130/80  Risk factor management   HTN, HLD, Atrial Fibrillation, Prior Stroke/TIA, and RAUDEL   Out of the therapeutic window and not a candidate for thrombolysis   He has been planning to see a new primary care physician and as a result is out of his medications. Possible uncontrolled risk factors is likely the cause of his stroke   PT/OT/Speech eval pending, appreciate recommendations  Longstanding persistent atrial fibrillation (HCC)  Per patient report PCP took him off Eliquis but cannot find documentation of this, appears he was instructed to follow up with cardiology regarding restarting Eliquis.  Currently rate controlled  Continue metoprolol 25 mg Q12H  Neurology recommending holding of anticoagulation overnight and as such have held Eliquis  Continue telemetry monitoring.  HTN (hypertension)  BP on admission noted at 231/124, current BP: 158/100  Pt noted with persistently elevated BP  Continue metoprolol 25 mg Q12H  Trend Bps  Start lisinopril 10 mg for persistently elevated BP above goal  Pt received labetalol 10 mg IV x 3 doses - pt continues w/ elevated BP   Start Hydralazine 10 mg Q6H for SBP > 180 & DBP > 95 with hold parameters for SBP < 110  Cardiology consulted, appreciate recommendations  Hypothyroid  Lab Results   Component Value Date    AND4PRSMTPPV 10.922 (H) 01/08/2025    [id number] 10.600 (H) 08/19/2020    RSD0XLIOPEIP 0.548 (L) 05/02/2015    FREET4 0.52 (L) 01/08/2025    FREET4 0.61 (L) 08/19/2020    FREET4 0.54 (L) 02/14/2020    TSH 12.00 (H) 02/14/2020   TSH noted to be elevated with decreased Free T4 on admission.   Continue levothyroxine 75 mcg daily  RAUDEL (obstructive sleep apnea)  Pt has a PMH of sleep apnea but is noncompliant with CPAP   Continue CPAP HS  Aneurysm (HCC)  CTA head & neck w/ & w/o contrast: \"CT Brain: New small infarct in the right occipital lobe that could be acute. No acute hemorrhage. CT Angiography: Proximal " "occlusion of the P2 segment of the right PCA. Stable 3 mm ACOM aneurysm. Moderate stenosis cervical left ICA progressed from prior study.  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  Unwitnessed fall  Pt reports that he fell and hurt his L shoulder in ED while taking off his pants. There is no documentation of his fall. Pt had an old total shoulder arthroplasty (TSA), which was doing good but since the fall has had bad shoulder pain and decreased ROM unlikely d/t his stroke. May have torn/partially torn rotator cuff or bicipital tendon or both.   Left shoulder xray 2+ vw: \"Acute nondisplaced fracture of the greater tuberosity of the humeral head.\"   Ortho consulted, appreciate recommendations  PT/OT consulted, appreciate recommendations  Fall precautions  Hyperlipidemia  Lab Results   Component Value Date    CHOLESTEROL 116 01/07/2025    CHOLESTEROL 120 08/22/2020    TRIG 74 01/07/2025    TRIG 92 08/22/2020    HDL 47 01/07/2025    HDL 54 08/22/2020    LDLCALC 54 01/07/2025    LDLCALC 48 08/22/2020       Leukocytosis  Lab Results   Component Value Date    WBC 10.57 (H) 01/08/2025    WBC 6.55 01/07/2025    WBC 6.09 08/24/2020   Elevated WBC noted on 1/8/25  No fevers noted during this admission  Procalcitonin labs in A.M.  Continue to monitor CBC  Continue to monitor VS, temp, symptoms  Status post total shoulder arthroplasty, left  Per pt - had a hx of L TSA but had excellent recovery.  Patient reports falling in ED during this admission, resulting in new pain and decreased ROM  +Neemg dee, ++Speeds, some pain/weakness on resisted ER>IR  Risk of RTC tear/partial tear, bicipital tendon tear  As above   Personal history of noncompliance with medical treatment and regimen  Pt reports noncompliance with medications for the last several months.  Pt has not seen PCP for medication refills  Plan to set " up PCP   Nicotine dependence with current use  Pt reports nicotine dependence   0.5 packs/day every day cigarette smoker  Nicotine patch ordered  Promote smoke cessation  Alcohol dependence (HCC)  Pt reports alcohol dependence   Promote alcohol cessation  Psychosocial stressors  Pt reports psychosocial stressor of recent divorce.  Pt at risk for depression  If available in acute setting, recommend cognitive therapy   At risk for increased confusion/delirium, restlessness, agitation, and fall - continue to monitor   Monitor neuro-exam, wakefulness, mood, cognition, insight into deficits and safety awareness   Overstimulation precautions, frequent re-orientation, re-direction, re-assurance  Optimal mood, pain, and sleep management  Plan for possible ARC/IRF disposition    VTE Pharmacologic Prophylaxis: VTE Score: 8 High Risk (Score >/= 5) - Pharmacological DVT Prophylaxis Contraindicated. Sequential Compression Devices Ordered.    Mobility:   Basic Mobility Inpatient Raw Score: 21  JH-HLM Goal: 6: Walk 10 steps or more  JH-HLM Achieved: 6: Walk 10 steps or more  JH-HLM Goal achieved. Continue to encourage appropriate mobility.    Patient Centered Rounds: I performed bedside rounds with nursing staff today.   Discussions with Specialists or Other Care Team Provider: D/w nurse, Cardiology, Neurology, PMR    Education and Discussions with Family / Patient: Attempted to update  (daughter) via phone. Unable to contact.    Current Length of Stay: 1 day(s)  Current Patient Status: Inpatient   Certification Statement: The patient will continue to require additional inpatient hospital stay due to stroke management  Discharge Plan: Anticipate discharge in >72 hrs to discharge location to be determined pending rehab evaluations.    Code Status: Level 1 - Full Code    Subjective   Pt is resting in bed. Patient denies any fever, chills, CP, SOB, N/V, numbness, or tingling. He is reporting 9/10 pain in his left  "shoulder from when he \"slipped out of bed while taking off his pants yesterday.\" He is reporting numbness from his left hand up to his wrist, around his left cheek & around the left corner of his mouth, and also numbness around his left ankle. He reports difficulty lifting his left arm as well.     Objective :  Temp:  [98 °F (36.7 °C)-98.1 °F (36.7 °C)] 98.1 °F (36.7 °C)  HR:  [] 73  BP: (144-231)/() 177/101  Resp:  [11-32] 18  SpO2:  [96 %-100 %] 100 %  O2 Device: None (Room air)    Body mass index is 25.85 kg/m².     Input and Output Summary (last 24 hours):     Intake/Output Summary (Last 24 hours) at 1/8/2025 1046  Last data filed at 1/8/2025 0730  Gross per 24 hour   Intake 400 ml   Output 250 ml   Net 150 ml       Physical Exam  Constitutional:       General: He is not in acute distress.     Appearance: He is not ill-appearing.   HENT:      Head: Atraumatic.        Mouth/Throat:      Tongue: Tongue does not deviate from midline.   Eyes:      General: No visual field deficit.  Cardiovascular:      Rate and Rhythm: Normal rate and regular rhythm.      Heart sounds: Normal heart sounds. No murmur heard.  Pulmonary:      Effort: Pulmonary effort is normal. No respiratory distress.      Breath sounds: Normal breath sounds. No wheezing or rales.   Abdominal:      General: Bowel sounds are normal. There is no distension.      Palpations: Abdomen is soft.      Tenderness: There is no abdominal tenderness.   Musculoskeletal:         General: No swelling.      Left shoulder: Tenderness present.      Left hand: Decreased range of motion. Decreased strength. Decreased sensation.        Arms:         Legs:       Comments: L upper arm decreased ROM   Skin:     Findings: No erythema or rash.   Neurological:      General: No focal deficit present.      Mental Status: He is alert and oriented to person, place, and time. Mental status is at baseline.      GCS: GCS eye subscore is 4. GCS verbal subscore is 5. GCS " motor subscore is 6.      Cranial Nerves: No dysarthria or facial asymmetry.      Sensory: Sensory deficit present.      Motor: Weakness and pronator drift present. No tremor or atrophy.      Coordination: Finger-Nose-Finger Test abnormal. Heel to Chamberlain Test normal.      Comments: Abnormal Left finger to nose   Positive pronator drift  in left arm   Psychiatric:         Mood and Affect: Mood normal.         Behavior: Behavior normal.         Thought Content: Thought content normal.         Judgment: Judgment normal.         Lines/Drains:      Telemetry:  Telemetry Orders (From admission, onward)               24 Hour Telemetry Monitoring  Continuous x 24 Hours (Telem)        Expiring   Question:  Reason for 24 Hour Telemetry  Answer:  TIA/Suspected CVA/ Confirmed CVA                     Telemetry Reviewed: Atrial fibrillation. HR averaging 60-80's and PVCs  Indication for Continued Telemetry Use: Arrthymias requiring medical therapy               Lab Results: I have reviewed the following results:   Results from last 7 days   Lab Units 01/08/25  0458   WBC Thousand/uL 10.57*   HEMOGLOBIN g/dL 16.1   HEMATOCRIT % 44.8   PLATELETS Thousands/uL 185   SEGS PCT % 75   LYMPHO PCT % 13*   MONO PCT % 9   EOS PCT % 2     Results from last 7 days   Lab Units 01/08/25  0458   SODIUM mmol/L 136   POTASSIUM mmol/L 4.2   CHLORIDE mmol/L 99   CO2 mmol/L 26   BUN mg/dL 16   CREATININE mg/dL 0.88   ANION GAP mmol/L 11   CALCIUM mg/dL 8.8   ALBUMIN g/dL 3.9   TOTAL BILIRUBIN mg/dL 1.24*   ALK PHOS U/L 52   ALT U/L 17   AST U/L 25   GLUCOSE RANDOM mg/dL 98     Results from last 7 days   Lab Units 01/08/25  0458   INR  1.01     Results from last 7 days   Lab Units 01/07/25  1305   POC GLUCOSE mg/dl 94     Results from last 7 days   Lab Units 01/08/25  0458   HEMOGLOBIN A1C % 5.3           Recent Cultures (last 7 days):         Imaging Results Review: I reviewed radiology reports from this admission including: CT head, MRI spine,  xray(s), and Echocardiogram.  Other Study Results Review: EKG was reviewed.     Last 24 Hours Medication List:     Current Facility-Administered Medications:     acetaminophen (TYLENOL) tablet 650 mg, Q6H PRN    aspirin chewable tablet 81 mg, Daily    atorvastatin (LIPITOR) tablet 40 mg, Daily With Dinner    hydrALAZINE (APRESOLINE) injection 10 mg, Q6H PRN    levothyroxine tablet 75 mcg, Early Morning    lisinopril (ZESTRIL) tablet 10 mg, Daily    metoprolol tartrate (LOPRESSOR) tablet 25 mg, Q12H MALGORZATA    nicotine (NICODERM CQ) 21 mg/24 hr TD 24 hr patch 1 patch, Daily    Administrative Statements   Today, Patient Was Seen By: ROSALINA Jeffries  I have spent a total time of 30 minutes in caring for this patient on the day of the visit/encounter including Diagnostic results, Risks and benefits of tx options, Instructions for management, Patient and family education, Importance of tx compliance, Risk factor reductions, Impressions, Counseling / Coordination of care, Documenting in the medical record, Reviewing / ordering tests, medicine, procedures  , Obtaining or reviewing history  , and Communicating with other healthcare professionals .    **Please Note: This note may have been constructed using a voice recognition system.**

## 2025-01-09 LAB
ANION GAP SERPL CALCULATED.3IONS-SCNC: 9 MMOL/L (ref 4–13)
BASOPHILS # BLD AUTO: 0.04 THOUSANDS/ΜL (ref 0–0.1)
BASOPHILS NFR BLD AUTO: 1 % (ref 0–1)
BUN SERPL-MCNC: 23 MG/DL (ref 5–25)
CALCIUM SERPL-MCNC: 8.5 MG/DL (ref 8.4–10.2)
CHLORIDE SERPL-SCNC: 102 MMOL/L (ref 96–108)
CO2 SERPL-SCNC: 26 MMOL/L (ref 21–32)
CREAT SERPL-MCNC: 1.09 MG/DL (ref 0.6–1.3)
EOSINOPHIL # BLD AUTO: 0.15 THOUSAND/ΜL (ref 0–0.61)
EOSINOPHIL NFR BLD AUTO: 2 % (ref 0–6)
ERYTHROCYTE [DISTWIDTH] IN BLOOD BY AUTOMATED COUNT: 12.6 % (ref 11.6–15.1)
GFR SERPL CREATININE-BSD FRML MDRD: 69 ML/MIN/1.73SQ M
GLUCOSE SERPL-MCNC: 95 MG/DL (ref 65–140)
HCT VFR BLD AUTO: 46.4 % (ref 36.5–49.3)
HGB BLD-MCNC: 16.5 G/DL (ref 12–17)
IMM GRANULOCYTES # BLD AUTO: 0.04 THOUSAND/UL (ref 0–0.2)
IMM GRANULOCYTES NFR BLD AUTO: 1 % (ref 0–2)
LYMPHOCYTES # BLD AUTO: 1.28 THOUSANDS/ΜL (ref 0.6–4.47)
LYMPHOCYTES NFR BLD AUTO: 15 % (ref 14–44)
MAGNESIUM SERPL-MCNC: 1.9 MG/DL (ref 1.9–2.7)
MCH RBC QN AUTO: 34.4 PG (ref 26.8–34.3)
MCHC RBC AUTO-ENTMCNC: 35.6 G/DL (ref 31.4–37.4)
MCV RBC AUTO: 97 FL (ref 82–98)
MONOCYTES # BLD AUTO: 0.94 THOUSAND/ΜL (ref 0.17–1.22)
MONOCYTES NFR BLD AUTO: 11 % (ref 4–12)
NEUTROPHILS # BLD AUTO: 6.08 THOUSANDS/ΜL (ref 1.85–7.62)
NEUTS SEG NFR BLD AUTO: 70 % (ref 43–75)
NRBC BLD AUTO-RTO: 0 /100 WBCS
PHOSPHATE SERPL-MCNC: 3.4 MG/DL (ref 2.3–4.1)
PLATELET # BLD AUTO: 195 THOUSANDS/UL (ref 149–390)
PMV BLD AUTO: 10.4 FL (ref 8.9–12.7)
POTASSIUM SERPL-SCNC: 4.1 MMOL/L (ref 3.5–5.3)
PROCALCITONIN SERPL-MCNC: <0.05 NG/ML
RBC # BLD AUTO: 4.79 MILLION/UL (ref 3.88–5.62)
SODIUM SERPL-SCNC: 137 MMOL/L (ref 135–147)
WBC # BLD AUTO: 8.53 THOUSAND/UL (ref 4.31–10.16)

## 2025-01-09 PROCEDURE — 99232 SBSQ HOSP IP/OBS MODERATE 35: CPT | Performed by: INTERNAL MEDICINE

## 2025-01-09 PROCEDURE — 97163 PT EVAL HIGH COMPLEX 45 MIN: CPT

## 2025-01-09 PROCEDURE — 85025 COMPLETE CBC W/AUTO DIFF WBC: CPT

## 2025-01-09 PROCEDURE — 99232 SBSQ HOSP IP/OBS MODERATE 35: CPT | Performed by: PSYCHIATRY & NEUROLOGY

## 2025-01-09 PROCEDURE — 84100 ASSAY OF PHOSPHORUS: CPT

## 2025-01-09 PROCEDURE — 97167 OT EVAL HIGH COMPLEX 60 MIN: CPT

## 2025-01-09 PROCEDURE — 83735 ASSAY OF MAGNESIUM: CPT

## 2025-01-09 PROCEDURE — 99232 SBSQ HOSP IP/OBS MODERATE 35: CPT | Performed by: FAMILY MEDICINE

## 2025-01-09 PROCEDURE — 80048 BASIC METABOLIC PNL TOTAL CA: CPT

## 2025-01-09 PROCEDURE — 84145 PROCALCITONIN (PCT): CPT

## 2025-01-09 RX ORDER — LISINOPRIL 20 MG/1
20 TABLET ORAL DAILY
Status: DISCONTINUED | OUTPATIENT
Start: 2025-01-10 | End: 2025-01-14 | Stop reason: HOSPADM

## 2025-01-09 RX ORDER — LISINOPRIL 10 MG/1
10 TABLET ORAL ONCE
Status: DISCONTINUED | OUTPATIENT
Start: 2025-01-09 | End: 2025-01-09

## 2025-01-09 RX ORDER — HEPARIN SODIUM 5000 [USP'U]/ML
5000 INJECTION, SOLUTION INTRAVENOUS; SUBCUTANEOUS EVERY 8 HOURS SCHEDULED
Status: DISCONTINUED | OUTPATIENT
Start: 2025-01-09 | End: 2025-01-10

## 2025-01-09 RX ADMIN — METOPROLOL TARTRATE 25 MG: 25 TABLET, FILM COATED ORAL at 21:15

## 2025-01-09 RX ADMIN — HEPARIN SODIUM 5000 UNITS: 5000 INJECTION, SOLUTION INTRAVENOUS; SUBCUTANEOUS at 21:15

## 2025-01-09 RX ADMIN — THIAMINE HYDROCHLORIDE 500 MG: 100 INJECTION, SOLUTION INTRAMUSCULAR; INTRAVENOUS at 08:22

## 2025-01-09 RX ADMIN — LISINOPRIL 10 MG: 10 TABLET ORAL at 08:21

## 2025-01-09 RX ADMIN — FOLIC ACID 1 MG: 1 TABLET ORAL at 08:21

## 2025-01-09 RX ADMIN — Medication 1 TABLET: at 08:21

## 2025-01-09 RX ADMIN — ASPIRIN 81 MG CHEWABLE TABLET 81 MG: 81 TABLET CHEWABLE at 08:21

## 2025-01-09 RX ADMIN — LEVOTHYROXINE SODIUM 75 MCG: 75 TABLET ORAL at 05:19

## 2025-01-09 RX ADMIN — ATORVASTATIN CALCIUM 40 MG: 40 TABLET, FILM COATED ORAL at 16:19

## 2025-01-09 RX ADMIN — METOPROLOL TARTRATE 25 MG: 25 TABLET, FILM COATED ORAL at 08:21

## 2025-01-09 RX ADMIN — NICOTINE 1 PATCH: 21 PATCH, EXTENDED RELEASE TRANSDERMAL at 08:21

## 2025-01-09 RX ADMIN — LIDOCAINE 1 PATCH: 50 PATCH TOPICAL at 08:21

## 2025-01-09 RX ADMIN — ACETAMINOPHEN 650 MG: 325 TABLET, FILM COATED ORAL at 12:39

## 2025-01-09 NOTE — PLAN OF CARE
Problem: PHYSICAL THERAPY ADULT  Goal: Performs mobility at highest level of function for planned discharge setting.  See evaluation for individualized goals.  Description: Treatment/Interventions: Functional transfer training, LE strengthening/ROM, Therapeutic exercise, Endurance training, Elevations, Cognitive reorientation, Patient/family training, Equipment eval/education, Bed mobility, Gait training, Spoke to nursing, Spoke to case management, OT          See flowsheet documentation for full assessment, interventions and recommendations.  Note: Prognosis: Good  Problem List: Decreased strength, Decreased endurance, Decreased range of motion, Impaired balance, Decreased mobility, Decreased coordination, Decreased cognition, Impaired judgement, Decreased safety awareness, Impaired sensation, Orthopedic restrictions, Pain  Assessment: Pt is a 69 y.o. male admitted to [facility] on 1/7/2025 with visual disturbance, LUE numbness/weakness, dysarthria, gait disturbance, and dizziness. Pt received a primary medical dx of CVA. Pt has the following comorbidities which affect their treatment: active problems listed above,  has a past medical history of A-fib (HCC), Disease of thyroid gland, Hypertension, and Stroke (HCC).  , as well as personal factors including stairs to access home. Pt has a high complexity clinical presentation due to Ongoing medical management for primary dx, Increased reliance on more restrictive AD compared to baseline, Decreased activity tolerance compared to baseline, Fall risk, Increased assistance needed from caregiver at current time, WBS, Ongoing telemetry monitoring, Cog status, Trending lab values, Diagnostic imaging pending, Continuous pulse oximetry monitoring  , and PMH. PT was consulted to evaluate pt's functional mobility and discharge needs. Upon evaluation, patient required minAx1 for all mobility as outlined above. Body system impairments include impaired balance, LUE strength, LLE  functional strength, endurance, cognition/safety awareness, +pain. Pt's functional activity impairments include: mobility. Participation restrictions include inability to return to home/community/leisure activities safely. At conclusion of eval, pt remained seated in chair with chair alarm, phone, call bell, and all other personal needs within reach. Pt would benefit from skilled PT to address their functional mobility limitations. The patient's [facility] Basic Mobility Inpatient Short Form Raw Score is 18. A Raw score of greater than 16 suggests the patient may benefit from discharge to home. Please also refer to the recommendation of the Physical Therapist for safe discharge planning.  Barriers to Discharge: Decreased caregiver support, Inaccessible home environment     Rehab Resource Intensity Level, PT: I (Maximum Resource Intensity) (pending progress, may progress to level 3)    See flowsheet documentation for full assessment.

## 2025-01-09 NOTE — QUICK NOTE
Patient seen and examined.  Patient was admitted earlier today with strokelike symptoms.  Patient was not taking his medication for more than 6 months at this time.  Patient with previous history of stroke and was on Eliquis.  Did have elevated blood pressure at the time of presentation.  Patient was not on his antihypertensives at home.  CAT scan and MRI reviewed-MRI showed recent infarctions in the right thalamus and right occipital lobe and right temporal lobe.  Neurology evaluation appreciated.  Currently on aspirin statin patient was loaded.  With Plavix earlier today.  As per neurology plan to restart back on anticoagulation on 10/10  Had a fall in the emergency room yesterday-was complaining of left shoulder pain  X-ray reviewed.-Noted to have nondisplaced humeral head fracture.  Ortho consulted  PMR  Consult appreciated  Monitor neurologic status  Follow-up on PT OT recommendation  Patient with elevated blood pressure at the time of presentation.  Noted to have hypertensive emergency.  Patient was not taking his outpatient medication.  Blood pressure improved with initiation of medication.  Monitor blood pressure.    Daughter reports patients about a case of beer a day at least-will start the patient on CIWA protocol.  IV thiamine 500 mg for 3 doses and then changed to p.o. thiamine-watch for for alcohol withdrawal symptoms

## 2025-01-09 NOTE — ARC ADMISSION
Patients case reviewed, patient looks appropriate to come to ARC pending therapy evals, medical stability, LOF at discharge, bed availability.  ARC admissions will continue to follow patient for progression of care and discharge readiness.

## 2025-01-09 NOTE — ASSESSMENT & PLAN NOTE
Rate controlled  Continue metoprolol  Patient was not taking any medication for the past 6 months or so.  Has not seen a PCP for since 2022  Restart back on the Eliquis once cleared by neurology likely by tomorrow

## 2025-01-09 NOTE — ASSESSMENT & PLAN NOTE
Family reported that patient drinks at least a case of beer a day.  Patient only endorses 5-6 beers a day.  CIWA protocol in place  With high-dose thiamine , continue with folic acid  Changed to thiamine 100 mg once high-dose thiamine is completed  Has not required any Ativan.

## 2025-01-09 NOTE — ASSESSMENT & PLAN NOTE
Previous on eliquis 5mg BID and metoprolol tartrate 25mg BID, however patient has been non-compliant  Per neuro, recommend restarting Eliquis on 1/10 d/t recent stroke  C/W metoprolol

## 2025-01-09 NOTE — CONSULTS
Consultation - Orthopedics   Name: Jose Ramon Quiñonez Jr. 69 y.o. male I MRN: [id number]  [facility] I Date of Admission: 1/7/2025   Date of Service: 1/8/2025 I Hospital Day: 1   Inpatient consult to Orthopedic Surgery  Consult performed by: Ander Mayorga MD  Consult ordered by: ROSALINA Jeffries        Physician Requesting Evaluation: Leilani Gutierrez MD   Reason for Evaluation / Principal Problem: Left shoulder pain    Assessment & Plan  Closed nondisplaced fracture of greater tuberosity of left humerus  69 y.o. male with left minimally displaced greater tuberosity fracture of left proximal humerus indicated for non-operative treatment with sling.     Plan:  WBAT to LUE in sling  Outpatient follow-up with repeat imaging in two weeks  PT/OT  Multimodal pain control  DVT ppx: none needed from orthopedic perspective    Medical co-morbidities are being managed per primary team.    Please contact the [employer] role BE Ortho Floor for any questions/concerns.    History of Present Illness   HPI: 69 y.o. right hand dominant male status post mechanical fall complaining of left shoulder pain. Negative head strike, negative LOC, and not on blood thinners. Pain is sharp and located to the posterior aspect of his proximal humerus. Pain exacerbated by movement and relieved with rest.     PMH pertinent for CVA, afib, HTN, RAUDEL.      Review of Systems   Constitutional:  Negative for chills and fever.   HENT:  Negative for ear pain and sore throat.    Eyes:  Negative for pain and visual disturbance.   Respiratory:  Negative for cough and shortness of breath.    Cardiovascular:  Negative for chest pain and palpitations.   Gastrointestinal:  Negative for abdominal pain and vomiting.   Genitourinary:  Negative for dysuria and hematuria.   Musculoskeletal:  Negative for arthralgias and back pain.   Skin:  Negative for color change and rash.   Neurological:  Positive for weakness and numbness. Negative for seizures and  "syncope.   All other systems reviewed and are negative.   significant for findings described in the HPI.  I have reviewed the patient's PMH, PSH, Social History, Family History, Meds, and Allergies    Objective :  Temp:  [98.1 °F (36.7 °C)-98.3 °F (36.8 °C)] 98.3 °F (36.8 °C)  HR:  [68-90] 90  BP: (144-202)/() 152/132  Resp:  [14-20] 16  SpO2:  [96 %-100 %] 96 %  O2 Device: None (Room air)  Physical ExamOrtho Exam   Musculoskeletal: left upper extremity  Skin intact, ecchymosis and swelling noted over the shoulder  Tender to palpation over shoulder and upper arm  SILT to m/r/u/axillary with baseline numbness due to prior CVA  Motor intact to  radial, ulnar, median, musculocutaneous, and axillary nerve distributions  2+ radial and ulnar pulse  Active range of motion limited due to pain, able to abduct to 40 degrees forward flex to 45 degrees internally and externally rotate to 30 degrees  Full passive range of motion albeit with some pain and no blocks to motion  Positive drop arm test    Tertiary exam was negative for additional palpable stepoffs or additional bony tenderness to palpation      Lab Results: I have reviewed the following results:   Recent Labs     01/07/25  1318 01/08/25  0458   WBC 6.55 10.57*   HGB 17.2* 16.1   HCT 49.1 44.8    185   BUN 15 16   CREATININE 1.02 0.88   PTT 29 28   INR 0.93 1.01     Blood Culture: No results found for: \"BLOODCX\"  Wound Culture: No results found for: \"WOUNDCULT\"    Radiology:   I personally reviewed the films.  X-rays AP, Grashey, and Velpeau view demonstrates located left shoulder with minimally displaced greater tuberosity fracture of left proximal humerus.   "

## 2025-01-09 NOTE — PLAN OF CARE
Problem: Potential for Falls  Goal: Patient will remain free of falls  Description: INTERVENTIONS:  - Educate patient/family on patient safety including physical limitations  - Instruct patient to call for assistance with activity   - Consult OT/PT to assist with strengthening/mobility   - Keep Call bell within reach  - Keep bed low and locked with side rails adjusted as appropriate  - Keep care items and personal belongings within reach  - Initiate and maintain comfort rounds  - Make Fall Risk Sign visible to staff  - Offer Toileting every 2 Hours, in advance of need  - Initiate/Maintain bed alarm  - Obtain necessary fall risk management equipment: non skid socks  - Apply yellow socks and bracelet for high fall risk patients  - Consider moving patient to room near nurses station  Outcome: Progressing     Problem: PAIN - ADULT  Goal: Verbalizes/displays adequate comfort level or baseline comfort level  Description: Interventions:  - Encourage patient to monitor pain and request assistance  - Assess pain using appropriate pain scale  - Administer analgesics based on type and severity of pain and evaluate response  - Implement non-pharmacological measures as appropriate and evaluate response  - Consider cultural and social influences on pain and pain management  - Notify physician/advanced practitioner if interventions unsuccessful or patient reports new pain  Outcome: Progressing     Problem: INFECTION - ADULT  Goal: Absence or prevention of progression during hospitalization  Description: INTERVENTIONS:  - Assess and monitor for signs and symptoms of infection  - Monitor lab/diagnostic results  - Monitor all insertion sites, i.e. indwelling lines, tubes, and drains  - Monitor endotracheal if appropriate and nasal secretions for changes in amount and color  - [place] appropriate cooling/warming therapies per order  - Administer medications as ordered  - Instruct and encourage patient and family to use good hand  hygiene technique  - Identify and instruct in appropriate isolation precautions for identified infection/condition  Outcome: Progressing  Goal: Absence of fever/infection during neutropenic period  Description: INTERVENTIONS:  - Monitor WBC    Outcome: Progressing     Problem: SAFETY ADULT  Goal: Patient will remain free of falls  Description: INTERVENTIONS:  - Educate patient/family on patient safety including physical limitations  - Instruct patient to call for assistance with activity   - Consult OT/PT to assist with strengthening/mobility   - Keep Call bell within reach  - Keep bed low and locked with side rails adjusted as appropriate  - Keep care items and personal belongings within reach  - Initiate and maintain comfort rounds  - Make Fall Risk Sign visible to staff  - Offer Toileting every 2 Hours, in advance of need  - Initiate/Maintain bed alarm  - Obtain necessary fall risk management equipment: non skid socks  - Apply yellow socks and bracelet for high fall risk patients  - Consider moving patient to room near nurses station  Outcome: Progressing  Goal: Maintain or return to baseline ADL function  Description: INTERVENTIONS:  -  Assess patient's ability to carry out ADLs; assess patient's baseline for ADL function and identify physical deficits which impact ability to perform ADLs (bathing, care of mouth/teeth, toileting, grooming, dressing, etc.)  - Assess/evaluate cause of self-care deficits   - Assess range of motion  - Assess patient's mobility; develop plan if impaired  - Assess patient's need for assistive devices and provide as appropriate  - Encourage maximum independence but intervene and supervise when necessary  - Involve family in performance of ADLs  - Assess for home care needs following discharge   - Consider OT consult to assist with ADL evaluation and planning for discharge  - Provide patient education as appropriate  Outcome: Progressing  Goal: Maintains/Returns to pre admission  functional level  Description: INTERVENTIONS:  - Perform AM-PAC 6 Click Basic Mobility/ Daily Activity assessment daily.  - Set and communicate daily mobility goal to care team and patient/family/caregiver.   - Collaborate with rehabilitation services on mobility goals if consulted  - Perform Range of Motion 3 times a day.  - Reposition patient every 2 hours.  - Dangle patient 3 times a day  - Stand patient 3 times a day  - Ambulate patient 3 times a day  - Out of bed to chair 3 times a day   - Out of bed for meals 3 times a day  - Out of bed for toileting  - Record patient progress and toleration of activity level   Outcome: Progressing     Problem: DISCHARGE PLANNING  Goal: Discharge to home or other facility with appropriate resources  Description: INTERVENTIONS:  - Identify barriers to discharge w/patient and caregiver  - Arrange for needed discharge resources and transportation as appropriate  - Identify discharge learning needs (meds, wound care, etc.)  - Arrange for interpretive services to assist at discharge as needed  - Refer to Case Management Department for coordinating discharge planning if the patient needs post-hospital services based on physician/advanced practitioner order or complex needs related to functional status, cognitive ability, or social support system  Outcome: Progressing     Problem: Knowledge Deficit  Goal: Patient/family/caregiver demonstrates understanding of disease process, treatment plan, medications, and discharge instructions  Description: Complete learning assessment and assess knowledge base.  Interventions:  - Provide teaching at level of understanding  - Provide teaching via preferred learning methods  Outcome: Progressing     Problem: Neurological Deficit  Goal: Neurological status is stable or improving  Description: Interventions:  - Monitor and assess patient's level of consciousness, motor function, sensory function, and level of assistance needed for ADLs.   - Monitor  and report changes from baseline. Collaborate with interdisciplinary team to initiate plan and implement interventions as ordered.   - Provide and maintain a safe environment.  - Consider seizure precautions.  - Consider fall precautions.  - Consider aspiration precautions.  - Consider bleeding precautions.  Outcome: Progressing     Problem: Activity Intolerance/Impaired Mobility  Goal: Mobility/activity is maintained at optimum level for patient  Description: Interventions:  - Assess and monitor patient  barriers to mobility and need for assistive/adaptive devices.  - Assess patient's emotional response to limitations.  - Collaborate with interdisciplinary team and initiate plans and interventions as ordered.  - Encourage independent activity per ability.  - Maintain proper body alignment.  - Perform active/passive rom as tolerated/ordered.  - Plan activities to conserve energy.  - Turn patient as appropriate  Outcome: Progressing     Problem: Potential for Aspiration  Goal: Non-ventilated patient's risk of aspiration is minimized  Description: Assess and monitor vital signs, respiratory status, and labs (WBC).  Monitor for signs of aspiration (tachypnea, cough, rales, wheezing, cyanosis, fever).    - Assess and monitor patient's ability to swallow.  - Place patient up in chair to eat if possible.  - HOB up at 90 degrees to eat if unable to get patient up into chair.  - Supervise patient during oral intake.   - Instruct patient/ family to take small bites.  - Instruct patient/ family to take small single sips when taking liquids.  - Follow patient-specific strategies generated by speech pathologist.  Outcome: Progressing     Problem: Nutrition  Goal: Nutrition/Hydration status is improving  Description: Monitor and assess patient's nutrition/hydration status for malnutrition (ex- brittle hair, bruises, dry skin, pale skin and conjunctiva, muscle wasting, smooth red tongue, and disorientation). Collaborate with  interdisciplinary team and initiate plan and interventions as ordered.  Monitor patient's weight and dietary intake as ordered or per policy. Utilize nutrition screening tool and intervene per policy. Determine patient's food preferences and provide high-protein, high-caloric foods as appropriate.     - Assist patient with eating.  - Allow adequate time for meals.  - Encourage patient to take dietary supplement as ordered.  - Collaborate with clinical nutritionist.  - Include patient/family/caregiver in decisions related to nutrition.  Outcome: Progressing

## 2025-01-09 NOTE — CASE MANAGEMENT
Case Management Assessment & Discharge Planning Note    Patient name Jose Ramon Quiñonez Jr.  Location [facility] 719/[facility] 719-01 MRN [id number]  : 1955 Date 2025       Current Admission Date: 2025  Current Admission Diagnosis:CVA (cerebral vascular accident) (HCC)   Patient Active Problem List    Diagnosis Date Noted Date Diagnosed    RAUDEL (obstructive sleep apnea) 2025     Unwitnessed fall 2025     Hyperlipidemia 2025     Leukocytosis 2025     Status post total shoulder arthroplasty, left 2025     Personal history of noncompliance with medical treatment and regimen 2025     Nicotine dependence with current use 2025     Alcohol dependence (HCC) 2025     Psychosocial stressors 2025     Closed nondisplaced fracture of greater tuberosity of left humerus 2025     Hypothyroid 2020     Aneurysm (HCC) 2020     Longstanding persistent atrial fibrillation (HCC) 2020     HTN (hypertension) 2020     CVA (cerebral vascular accident) ([facility]) 2020       LOS (days): 2  Geometric Mean LOS (GMLOS) (days): 2.8  Days to GMLOS:1.3     OBJECTIVE:    Risk of Unplanned Readmission Score: 8.75         Current admission status: Inpatient       Preferred Pharmacy:   [id number] [place] - [address]  [address]  [place] [address]  Phone: [phone] Fax: [phone]    [facility] [place] - [address]  [address]  [place] [address]  Phone: [phone] Fax: [phone]    [id number] - [place] - [address]   [place] [address]  Phone: [phone] Fax: [phone]    Primary Care Provider: Margarito Grant MD    Primary Insurance: MEDICARE  Secondary Insurance:     ASSESSMENT:  Active Health Care Proxies    There are no active Health Care Proxies on file.                 Readmission Root Cause  30 Day Readmission: No    Patient  Information  Admitted from:: Home  Mental Status: Alert  During Assessment patient was accompanied by: Daughter  Assessment information provided by:: Patient, Daughter  Primary Caregiver: Self  Support Systems: Self, Family members  County of Residence: [place]  [facility] do you live in?: [place]  Type of Current Residence: 2 story home  Upon entering residence, is there a bedroom on the main floor (no further steps)?: No  A bedroom is located on the following floor levels of residence (select all that apply):: 2nd Floor  Upon entering residence, is there a bathroom on the main floor (no further steps)?: No  Indicate which floors of current residence have a bathroom (select all the apply):: 2nd Floor  Number of steps to 2nd floor from main floor: One Flight  Living Arrangements: Lives w/ Extended Family  Is patient a ?: No    Activities of Daily Living Prior to Admission  Functional Status: Independent  Completes ADLs independently?: Yes  Ambulates independently?: Yes  Does patient use assisted devices?: No  Does patient currently own DME?: No  Does patient have a history of Outpatient Therapy (PT/OT)?: No  Does the patient have a history of Short-Term Rehab?: No  Does patient have a history of HHC?: No  Does patient currently have HHC?: No         Patient Information Continued  Income Source: Pension/FPC  Does patient have prescription coverage?: Yes  Does patient receive dialysis treatments?: No  Does patient have a history of substance abuse?: No  Does patient have a history of Mental Health Diagnosis?: No         Means of Transportation  Means of Transport to Appts:: Drives Self          DISCHARGE DETAILS:    Discharge planning discussed with:: bedside with pt and daughter Alicia Sotelo  Freedom of Choice: Yes  Comments - Freedom of Choice: Discussed FOC no aftercare reccs at this time  CM contacted family/caregiver?: No- see comments (daughter was bedside)     Contacts  Patient Contacts: Pita  Lety Quiñonez  Relationship to Patient:: Family (daughter)  Contact Method: Phone  Phone Number: [phone]  Reason/Outcome: Emergency Contact       Would you like to participate in our [facility] service program?  : No - Declined     Additional Comments: CM spokr with pt and daughter Alicia Sotelo bedside. Pt resdies with his cousin in a 2 st home. PCP is with [facility], he needs to find a new PCP, he has not seen him in some time. Pt was IPTA. Pt has been non-compliant with his [employer]ist. Pt daughter Pita Davidson is the 1st contact however she is currently out of state so Alicia Sotelo is the main contact

## 2025-01-09 NOTE — DISCHARGE INSTR - AVS FIRST PAGE
Discharge instructions  You were admitted to the hospital for evaluation of stroke.  An MRI of your brain showed multiple areas of stroke.  After evaluation by neurology, it is felt that these strokes were secondary to noncompliance with your previous home medication regimen.  It is very important that you continue to take your meds as prescribed.  On discharge I will send a new prescription for each of the your medications.  Please be sure to  your medications on your way home from the hospital.  Otherwise you will also need to be sure to call and schedule follow-up appoint with your family doctor.  I have also sent referrals to the cardiology team and orthopedic surgery teams.    Please be sure to return to the hospital if you have any recurrence of symptoms or any new neurologic symptoms such as facial droop, difficulty with speech, slurred speech, difficulty swallowing, vision changes, numbness or tingling, or weakness.    It was pleasure to care for you during your hospital stay,  If you have any further questions please feel free to call    [facility]  [phone]        Discharge Instructions - Orthopedics  Jose Ramon Delunadaniela Berry 69 y.o. male MRN: [id number]  Unit/Bed#: [facility] 719-01    Weight Bearing Status:                                           You may bear weight as tolerated on your Left Upper Extremity. Sling for comfort as needed.    Pain:  Continue pain medications as needed.    Dressing Instructions:   Please keep clean, dry and intact until follow up.    Appt Instructions:   If you do not have your appointment, please call the clinic at [phone]  Otherwise follow up as scheduled/instructed.    Contact the office sooner if you experience any increased numbness/tingling in the extremities.

## 2025-01-09 NOTE — PHYSICAL THERAPY NOTE
Physical Therapy Evaluation    Patient Name: Jose Ramon Quiñonez Jr.    Today's Date: 1/9/2025     Problem List  Principal Problem:    CVA (cerebral vascular accident) ([facility])  Active Problems:    Longstanding persistent atrial fibrillation (HCC)    HTN (hypertension)    Hypothyroid    Aneurysm (HCC)    RAUDEL (obstructive sleep apnea)    Unwitnessed fall    Hyperlipidemia    Leukocytosis    Status post total shoulder arthroplasty, left    Personal history of noncompliance with medical treatment and regimen    Nicotine dependence with current use    Alcohol dependence (HCC)    Psychosocial stressors    Closed nondisplaced fracture of greater tuberosity of left humerus       Past Medical History  Past Medical History:   Diagnosis Date    A-fib (HCC)     Disease of thyroid gland     Hypertension     Stroke (HCC)         Past Surgical History  Past Surgical History:   Procedure Laterality Date    ROTATOR CUFF REPAIR           01/09/25 0904   PT Last Visit   PT Visit Date 01/09/25   Note Type   Note type Evaluation   Pain Assessment   Pain Assessment Tool 0-10   Pain Score 10 - Worst Possible Pain   Pain Location/Orientation Orientation: Left;Location: Arm   Restrictions/Precautions   Weight Bearing Precautions Per Order Yes   LUE Weight Bearing Per Order (S)  NWB   Braces or Orthoses Sling  (LUE)   Other Precautions Cognitive;Chair Alarm;Bed Alarm;Multiple lines;Telemetry;Fall Risk;WBS  (L humerus fx, impulsive, dysarthria)   Home Living   Type of Home House   Home Layout Two level;1/2 bath on main level  (1 [address], full flight to 2nd floor bed/bath)   Bathroom Shower/Tub Tub/shower unit   Bathroom Toilet Standard   Additional Comments no AD use PTA   Prior Function   Level of [place] Independent with ADLs;Independent with functional mobility;Independent with IADLS   Lives With Family  (cousin, tenant/friend lives in basement)   Receives Help From Family   IADLs Independent  with driving;Independent with meal prep;Independent with medication management   Falls in the last 6 months 1 to 4  (2 total; 1 fall in ED this admission resulting in L humerus fx)   Comments cousin works. local daughers and grandchildren   General   Additional Pertinent History pt likes doing wood working   Family/Caregiver Present Yes  (local daughter)   Cognition   Overall Cognitive Status Impaired   Arousal/Participation Cooperative   Attention Attends with cues to redirect   Orientation Level Oriented X4   Memory Decreased recall of precautions   Following Commands Follows one step commands without difficulty   Comments pleasant and impulsive at times   Subjective   Subjective agreeable   LUE Assessment   LUE Assessment   (+weakness noted, defered strength testing due to humerus fx, hand numbness reported since stroke not humerus fx)   RLE Assessment   RLE Assessment WFL   LLE Assessment   LLE Assessment   (mild functional weakness noted on stairs. no overt buckling)   Vision-Basic Assessment   Current Vision No visual deficits  (denies any impairments, wears glasses to read)   Light Touch   RLE Light Touch Grossly intact   LLE Light Touch Grossly intact   Bed Mobility   Supine to Sit 4  Minimal assistance   Additional items Assist x 1;HOB elevated;Increased time required;Verbal cues;LE management   Sit to Supine Unable to assess   Transfers   Sit to Stand 4  Minimal assistance   Additional items Assist x 1;Increased time required;Verbal cues   Stand to Sit 4  Minimal assistance   Additional items Assist x 1;Increased time required;Verbal cues   Additional Comments no AD   Ambulation/Elevation   Gait pattern Improper Weight shift;Decreased foot clearance;Short stride;Excessively slow   Gait Assistance 4  Minimal assist   Additional items Assist x 1;Verbal cues   Assistive Device None   Distance 70'x2   Stair Management Assistance 4  Minimal assist   Additional items Assist x 1;Verbal cues;Increased time  required   Stair Management Technique One rail R;Step to pattern;Foreward;Nonreciprocal   Number of Stairs 2  (limited by IV pole)   Ambulation/Elevation Additional Comments LOB after taking a few steps away from bed requiring minAx1 to correct.   Balance   Static Sitting Fair +   Dynamic Sitting Fair   Static Standing Fair -   Dynamic Standing Poor +   Ambulatory Poor +   Endurance Deficit   Endurance Deficit Yes   Activity Tolerance   Activity Tolerance Patient limited by fatigue;Patient limited by pain   Medical Staff Made Aware OT   Nurse Made Aware yes-cleared   Assessment   Prognosis Good   Problem List Decreased strength;Decreased endurance;Decreased range of motion;Impaired balance;Decreased mobility;Decreased coordination;Decreased cognition;Impaired judgement;Decreased safety awareness;Impaired sensation;Orthopedic restrictions;Pain   Assessment Pt is a 69 y.o. male admitted to [facility] on 1/7/2025 with visual disturbance, LUE numbness/weakness, dysarthria, gait disturbance, and dizziness. Pt received a primary medical dx of CVA. Pt has the following comorbidities which affect their treatment: active problems listed above,  has a past medical history of A-fib (HCC), Disease of thyroid gland, Hypertension, and Stroke (HCC).  , as well as personal factors including stairs to access home. Pt has a high complexity clinical presentation due to Ongoing medical management for primary dx, Increased reliance on more restrictive AD compared to baseline, Decreased activity tolerance compared to baseline, Fall risk, Increased assistance needed from caregiver at current time, WBS, Ongoing telemetry monitoring, Cog status, Trending lab values, Diagnostic imaging pending, Continuous pulse oximetry monitoring  , and PMH. PT was consulted to evaluate pt's functional mobility and discharge needs. Upon evaluation, patient required minAx1 for all mobility as outlined above. Body system impairments include impaired balance, LUE  strength, LLE functional strength, endurance, cognition/safety awareness, +pain. Pt's functional activity impairments include: mobility. Participation restrictions include inability to return to home/community/leisure activities safely. At conclusion of eval, pt remained seated in chair with chair alarm, phone, call bell, and all other personal needs within reach. Pt would benefit from skilled PT to address their functional mobility limitations. The patient's -[facility] Basic Mobility Inpatient Short Form Raw Score is 18. A Raw score of greater than 16 suggests the patient may benefit from discharge to home. Please also refer to the recommendation of the Physical Therapist for safe discharge planning.   Barriers to Discharge Decreased caregiver support;Inaccessible home environment   Goals   Patient Goals to improve   STG Expiration Date 01/23/25   Short Term Goal #1 In 14 days, pt will: 1) perform bed mobility with mod I to promote functional independence 2) perform transfers to<>from all surfaces with mod I to promote safety 3) ambulate 300' with mod I and least restrictive device to promote safe return to home. 4) negotiate 13 stairs with mod I to promote safe return to home. 5) improve balance grades by 1/2 to promote safety with functional mobility. 6) improve strength grades by 1/2 to promote safety with functional mobility.   PT Treatment Day 0   Plan   Treatment/Interventions Functional transfer training;LE strengthening/ROM;Therapeutic exercise;Endurance training;Elevations;Cognitive reorientation;Patient/family training;Equipment eval/education;Bed mobility;Gait training;Spoke to nursing;Spoke to case management;OT   PT Frequency 3-5x/wk   Discharge Recommendation   Rehab Resource Intensity Level, PT I (Maximum Resource Intensity)  (pending progress, may progress to level 3)   [facility] Basic Mobility Inpatient   Turning in Flat Bed Without Bedrails 3   Lying on Back to Sitting on Edge of Flat Bed Without  Bedrails 3   Moving Bed to Chair 3   Standing Up From Chair Using Arms 3   Walk in Room 3   Climb 3-5 Stairs With Railing 3   Basic Mobility Inpatient Raw Score 18   Basic Mobility Standardized Score 41.05   [facility] Level Of Mobility   -[facility] Goal 6: Walk 10 steps or more   -HL Achieved 7: Walk 25 feet or more   Modified Jd Scale   Modified Jd Scale 4   Barthel Index   Feeding 5   Bathing 0   Grooming Score 0   Dressing Score 5   Bladder Score 10   Bowels Score 10   Toilet Use Score 5   Transfers (Bed/Chair) Score 10   Mobility (Level Surface) Score 10   Stairs Score 5   Barthel Index Score 60   Clayton Doe, PT, DPT, NCS

## 2025-01-09 NOTE — [facility] ADMISSION
[facility]  met with patient at bedside. Reviewed [facility] program, acute rehab criteria and approval process, medicare benefits/days/criteria, as well as [facility] locations and preferences. Patient's preferred [facility] location is  [facility] and second choice is [facility].  [facility] Rehab folder left with patient and all questions answered. Patient was made aware that [facility] Reviewer will keep their  updated regarding referral status.

## 2025-01-09 NOTE — ASSESSMENT & PLAN NOTE
BP 230s/120s in ED 1/7 iso stroke; currently symptomatic w/ blurry vision, possibly due to stroke vs. HTN. No headache, CP, or SOB.  Home medications include metoprolol tartrate 25mg BID and lisinopril 10mg daily- patient endorses that he has been non-compliant with any of his medications for the past 5-6 months  S/p labetalol 10mg IV x3 in ED  BP remaining elevated despite restarting home metoprolol and lisinopril this AM as well as PRN hydralazine x1  BP today downtrending from 200s systolic to 160s  Goal BP per neuro- <130s/80s  Patient's home medications likely have not reached full therapeutic effect after restarting  Lisinopril increased to 20mg daily by primary team  C/w metoprolol tartrate 25mg BID and lisinopril 20mg daily  Will consider adding afterload-reducing agent if hypertension persists, due to elevated diastolic BPs  C/W PRN hydralazine to 25mg PO Q6H for systolic BP >160

## 2025-01-09 NOTE — PLAN OF CARE
Problem: Potential for Falls  Goal: Patient will remain free of falls  Description: INTERVENTIONS:  - Educate patient/family on patient safety including physical limitations  - Instruct patient to call for assistance with activity   - Consult OT/PT to assist with strengthening/mobility   - Keep Call bell within reach  - Keep bed low and locked with side rails adjusted as appropriate  - Keep care items and personal belongings within reach  - Initiate and maintain comfort rounds  - Make Fall Risk Sign visible to staff  - Offer Toileting every 2 Hours, in advance of need  - Initiate/Maintain bed alarm  - Obtain necessary fall risk management equipment: non skid socks  - Apply yellow socks and bracelet for high fall risk patients  - Consider moving patient to room near nurses station  Outcome: Progressing     Problem: PAIN - ADULT  Goal: Verbalizes/displays adequate comfort level or baseline comfort level  Description: Interventions:  - Encourage patient to monitor pain and request assistance  - Assess pain using appropriate pain scale  - Administer analgesics based on type and severity of pain and evaluate response  - Implement non-pharmacological measures as appropriate and evaluate response  - Consider cultural and social influences on pain and pain management  - Notify physician/advanced practitioner if interventions unsuccessful or patient reports new pain  Outcome: Progressing     Problem: SAFETY ADULT  Goal: Patient will remain free of falls  Description: INTERVENTIONS:  - Educate patient/family on patient safety including physical limitations  - Instruct patient to call for assistance with activity   - Consult OT/PT to assist with strengthening/mobility   - Keep Call bell within reach  - Keep bed low and locked with side rails adjusted as appropriate  - Keep care items and personal belongings within reach  - Initiate and maintain comfort rounds  - Make Fall Risk Sign visible to staff  - Offer Toileting every 2  Hours, in advance of need  - Initiate/Maintain bed alarm  - Obtain necessary fall risk management equipment: non skid socks  - Apply yellow socks and bracelet for high fall risk patients  - Consider moving patient to room near nurses station  Outcome: Progressing  Goal: Maintain or return to baseline ADL function  Description: INTERVENTIONS:  -  Assess patient's ability to carry out ADLs; assess patient's baseline for ADL function and identify physical deficits which impact ability to perform ADLs (bathing, care of mouth/teeth, toileting, grooming, dressing, etc.)  - Assess/evaluate cause of self-care deficits   - Assess range of motion  - Assess patient's mobility; develop plan if impaired  - Assess patient's need for assistive devices and provide as appropriate  - Encourage maximum independence but intervene and supervise when necessary  - Involve family in performance of ADLs  - Assess for home care needs following discharge   - Consider OT consult to assist with ADL evaluation and planning for discharge  - Provide patient education as appropriate  Outcome: Progressing  Goal: Maintains/Returns to pre admission functional level  Description: INTERVENTIONS:  - Perform AM-PAC 6 Click Basic Mobility/ Daily Activity assessment daily.  - Set and communicate daily mobility goal to care team and patient/family/caregiver.   - Collaborate with rehabilitation services on mobility goals if consulted  - Perform Range of Motion 3 times a day.  - Reposition patient every 2 hours.  - Dangle patient 3 times a day  - Stand patient 3 times a day  - Ambulate patient 3 times a day  - Out of bed to chair 3 times a day   - Out of bed for meals 3 times a day  - Out of bed for toileting  - Record patient progress and toleration of activity level   Outcome: Progressing     Problem: DISCHARGE PLANNING  Goal: Discharge to home or other facility with appropriate resources  Description: INTERVENTIONS:  - Identify barriers to discharge  w/patient and caregiver  - Arrange for needed discharge resources and transportation as appropriate  - Identify discharge learning needs (meds, wound care, etc.)  - Arrange for interpretive services to assist at discharge as needed  - Refer to Case Management Department for coordinating discharge planning if the patient needs post-hospital services based on physician/advanced practitioner order or complex needs related to functional status, cognitive ability, or social support system  Outcome: Progressing     Problem: Knowledge Deficit  Goal: Patient/family/caregiver demonstrates understanding of disease process, treatment plan, medications, and discharge instructions  Description: Complete learning assessment and assess knowledge base.  Interventions:  - Provide teaching at level of understanding  - Provide teaching via preferred learning methods  Outcome: Progressing     Problem: Neurological Deficit  Goal: Neurological status is stable or improving  Description: Interventions:  - Monitor and assess patient's level of consciousness, motor function, sensory function, and level of assistance needed for ADLs.   - Monitor and report changes from baseline. Collaborate with interdisciplinary team to initiate plan and implement interventions as ordered.   - Provide and maintain a safe environment.  - Consider seizure precautions.  - Consider fall precautions.  - Consider aspiration precautions.  - Consider bleeding precautions.  Outcome: Progressing     Problem: Activity Intolerance/Impaired Mobility  Goal: Mobility/activity is maintained at optimum level for patient  Description: Interventions:  - Assess and monitor patient  barriers to mobility and need for assistive/adaptive devices.  - Assess patient's emotional response to limitations.  - Collaborate with interdisciplinary team and initiate plans and interventions as ordered.  - Encourage independent activity per ability.  - Maintain proper body alignment.  -  Perform active/passive rom as tolerated/ordered.  - Plan activities to conserve energy.  - Turn patient as appropriate  Outcome: Progressing     Problem: Potential for Aspiration  Goal: Non-ventilated patient's risk of aspiration is minimized  Description: Assess and monitor vital signs, respiratory status, and labs (WBC).  Monitor for signs of aspiration (tachypnea, cough, rales, wheezing, cyanosis, fever).    - Assess and monitor patient's ability to swallow.  - Place patient up in chair to eat if possible.  - HOB up at 90 degrees to eat if unable to get patient up into chair.  - Supervise patient during oral intake.   - Instruct patient/ family to take small bites.  - Instruct patient/ family to take small single sips when taking liquids.  - Follow patient-specific strategies generated by speech pathologist.  Outcome: Progressing     Problem: Nutrition  Goal: Nutrition/Hydration status is improving  Description: Monitor and assess patient's nutrition/hydration status for malnutrition (ex- brittle hair, bruises, dry skin, pale skin and conjunctiva, muscle wasting, smooth red tongue, and disorientation). Collaborate with interdisciplinary team and initiate plan and interventions as ordered.  Monitor patient's weight and dietary intake as ordered or per policy. Utilize nutrition screening tool and intervene per policy. Determine patient's food preferences and provide high-protein, high-caloric foods as appropriate.     - Assist patient with eating.  - Allow adequate time for meals.  - Encourage patient to take dietary supplement as ordered.  - Collaborate with clinical nutritionist.  - Include patient/family/caregiver in decisions related to nutrition.  Outcome: Progressing

## 2025-01-09 NOTE — ASSESSMENT & PLAN NOTE
Patient with hypertensive emergency at the time of presentation.  Monitor blood pressure  Restarted back on metoprolol and lisinopril.  Increase the dose of lisinopril to 20 mg due to persistent hypertension.  Monitor blood pressure

## 2025-01-09 NOTE — CERTIFIED RECOVERY SPECIALIST
"Time spent: 30 minutes      CRS met with patient to explain services and offer support if needed. Patient said he drinks occasionally but nothing like he used to. Patient shared his story and was emotional at times. 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 Patient \"supported by his daughters and feels close to them\".     CRS will continue to follow and support as needed. Patient well aware of resources and declined needing any for now.  CRS provided business card.  "

## 2025-01-09 NOTE — PLAN OF CARE
Problem: OCCUPATIONAL THERAPY ADULT  Goal: Performs self-care activities at highest level of function for planned discharge setting.  See evaluation for individualized goals.  Description: Treatment Interventions: ADL retraining, Functional transfer training, UE strengthening/ROM, Endurance training, Cognitive reorientation, Patient/family training, Equipment evaluation/education, Compensatory technique education, Continued evaluation, Energy conservation, Activityengagement          See flowsheet documentation for full assessment, interventions and recommendations.   Note: Limitation: Decreased ADL status, Decreased UE ROM, Decreased UE strength, Decreased Safe judgement during ADL, Decreased cognition, Decreased endurance, Decreased fine motor control, Decreased self-care trans, Decreased high-level ADLs, Decreased sensation  Prognosis: Good  Assessment: Pt is a 69 y.o. male who was admitted to [facility] on 1/7/2025 with visual disturbance, LUE numbness/weakness, dysarthria, gait disturbance starting 4 days ago, felt dizzy  CVA (cerebral vascular accident) (HCC) CTH +New small infarct in the right occipital lobe that could be acute, MRI: Recent infarctions of R thalamus, R occipital lobe and R temporal lobe. Stable old R cerebellar infarct  . Patient  has a past medical history of A-fib ([facility]), Disease of thyroid gland, Hypertension, and Stroke ([facility]).   At baseline pt was completing I with ADL's/IADL's, no AD with functional mobility +drives. Pt lives with cousin in a 2SH (friend tentant in basement) [place]. Currently pt requires min/mod a  for overall ADLS and S/min a without AD and sling to left UE for functional mobility/transfers. Pt currently presents with impairments in the following categories -steps to enter environment, limited home support, difficulty performing ADLS, difficulty performing IADLS , limited insight into deficits, and compliance activity tolerance, endurance, standing  balance/tolerance, sitting balance/tolerance, UE strength, UE ROM, FMC, GMC, safety , judgement , and attention . These impairments, as well as pt's fatigue, pain, orthopedic restricitions , WBS , impulsivity, decreased caregiver support, risk for falls, and home environment  limit pt's ability to safely engage in all baseline areas of occupation, includingeating, grooming, bathing, dressing, toileting, functional mobility/transfers, community mobility, laundry , driving, house maintenance, medication management, meal prep, social participation , and leisure activities  From OT standpoint, recommend max level I upon D/C.  The patient's raw score on the -PAC Daily Activity Inpatient Short Form is 13. A raw score of less than 19 suggests the patient may benefit from discharge to post-acute rehabilitation services. Please refer to the recommendation of the Occupational Therapist for safe discharge planning. The patient's raw score on the -PAC Daily Activity Inpatient Short Form is 13. A raw score of less than 19 suggests the patient may benefit from discharge to post-acute rehabilitation services. Please refer to the recommendation of the Occupational Therapist for safe discharge planning. OT will continue to follow to address the below stated goals.     Rehab Resource Intensity Level, OT: I (Maximum Resource Intensity)

## 2025-01-09 NOTE — ASSESSMENT & PLAN NOTE
Presents with visual disturbance, LUE numbness/weakness, dysarthria, gait disturbance starting 4 days ago, felt dizzy today prompting ED visit  Multiple stroke risk factors including afib not on AC, untreated HTN due to noncompliance with meds, prior history of embolic stroke R cerebellar  CTH:New small infarct in the right occipital lobe that could be acute. No acute hemorrhage.   CTA: Proximal occlusion of the P2 segment of the right PCA. Stable 3 mm ACOM aneurysm. Moderate stenosis cervical left ICA progressed from prior study. 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   Neurology following  MRI brain results reviewed-recent infractions in the right thalamus, and right occipital lobe, right temporal lobe.  This is discontinued because regions of acute/recent infractions are subtended by the right posterior cerebral artery correlating to the P2 segment of the right posterior cerebral artery occlusion seen on yesterday's CTA.  No acute hemorrhagic conversion  Echo reviewed-ejection fraction 65%.  Systolic function is normal.  ASA, statin  Neuro checks, stroke pathway  Goal normotension SBP<130/80

## 2025-01-09 NOTE — ASSESSMENT & PLAN NOTE
69 y.o. male with left minimally displaced greater tuberosity fracture of left proximal humerus indicated for non-operative treatment with sling.     Plan:  WBAT to LUE in sling  Outpatient follow-up with repeat imaging in two weeks  PT/OT  Multimodal pain control  DVT ppx: none needed from orthopedic perspective

## 2025-01-09 NOTE — PROGRESS NOTES
Progress Note - Hospitalist   Name: Jose Ramon Quiñonez Jr. 69 y.o. male I MRN: [id number]  Unit/Bed#: [facility] [place] 719-01 I Date of Admission: 1/7/2025   Date of Service: 1/9/2025 I Hospital Day: 2    Assessment & Plan  CVA (cerebral vascular accident) (HCC)  Presents with visual disturbance, LUE numbness/weakness, dysarthria, gait disturbance starting 4 days ago, felt dizzy today prompting ED visit  Multiple stroke risk factors including afib not on AC, untreated HTN due to noncompliance with meds, prior history of embolic stroke R cerebellar  CTH:New small infarct in the right occipital lobe that could be acute. No acute hemorrhage.   CTA: Proximal occlusion of the P2 segment of the right PCA. Stable 3 mm ACOM aneurysm. Moderate stenosis cervical left ICA progressed from prior study. 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   Neurology following  MRI brain results reviewed-recent infractions in the right thalamus, and right occipital lobe, right temporal lobe.  This is discontinued because regions of acute/recent infractions are subtended by the right posterior cerebral artery correlating to the P2 segment of the right posterior cerebral artery occlusion seen on yesterday's CTA.  No acute hemorrhagic conversion  Echo reviewed-ejection fraction 65%.  Systolic function is normal.  ASA, statin  Neuro checks, stroke pathway  Goal normotension SBP<130/80  Longstanding persistent atrial fibrillation (HCC)  Rate controlled  Continue metoprolol  Patient was not taking any medication for the past 6 months or so.  Has not seen a PCP for since 2022  Restart back on the Eliquis once cleared by neurology likely by tomorrow  HTN (hypertension)  Patient with hypertensive emergency at the time of presentation.  Monitor blood pressure  Restarted back on metoprolol and lisinopril.  Increase the dose of lisinopril to 20 mg due to persistent hypertension.  Monitor blood pressure  Hypothyroid  Continue  levothyroxine-repeat thyroid studies in 4 to 6 weeks as outpatient  RAUDEL (obstructive sleep apnea)  CPAP HS  Aneurysm (HCC)    Unwitnessed fall  Patient had an admit nurse to fall in the emergency room.  Patient with left shoulder pain.  X-ray reviewed.  Noted to have acute nondisplaced fracture of the greater tuberosity of the humeral head  Or the evaluation appreciated.  Nonweightbearing left upper extremity.  Plan for conservative management with sling and repeat x-ray in 2 weeks and outpatient follow-up with orthopedic  Hyperlipidemia    Leukocytosis  Resolved  Personal history of noncompliance with medical treatment and regimen    Nicotine dependence with current use    Alcohol dependence (HCC)  Family reported that patient drinks at least a case of beer a day.  Patient only endorses 5-6 beers a day.  CIWA protocol in place  With high-dose thiamine , continue with folic acid  Changed to thiamine 100 mg once high-dose thiamine is completed  Has not required any Ativan.   Psychosocial stressors    Closed nondisplaced fracture of greater tuberosity of left humerus      VTE Pharmacologic Prophylaxis: VTE Score: 8 Moderate Risk (Score 3-4) - Pharmacological DVT Prophylaxis Ordered: heparin.    Mobility:   Basic Mobility Inpatient Raw Score: 21  JH-HLM Goal: 6: Walk 10 steps or more  JH-HLM Achieved: 7: Walk 25 feet or more  JH-HLM Goal achieved. Continue to encourage appropriate mobility.    Patient Centered Rounds: I performed bedside rounds with nursing staff today.   Discussions with Specialists or Other Care Team Provider:     Education and Discussions with Family / Patient: Updated  (daughter) via phone.    Current Length of Stay: 2 day(s)  Current Patient Status: Inpatient   Certification Statement: The patient will continue to require additional inpatient hospital stay due to CVA  Discharge Plan: Anticipate discharge in 48-72 hrs to rehab facility.    Code Status: Level 1 - Full  Code    Subjective       Objective :  Temp:  [97.7 °F (36.5 °C)-98.9 °F (37.2 °C)] 98.5 °F (36.9 °C)  HR:  [68-90] 68  BP: (134-152)/() 144/97  Resp:  [16-18] 16  SpO2:  [96 %-99 %] 97 %  O2 Device: None (Room air)    Body mass index is 25.85 kg/m².     Input and Output Summary (last 24 hours):     Intake/Output Summary (Last 24 hours) at 1/9/2025 1752  Last data filed at 1/9/2025 1742  Gross per 24 hour   Intake 870 ml   Output 325 ml   Net 545 ml       Physical Exam  Constitutional:       General: He is not in acute distress.  HENT:      Head: Normocephalic and atraumatic.      Nose: Nose normal.   Eyes:      General: No scleral icterus.  Cardiovascular:      Rate and Rhythm: Normal rate and regular rhythm.      Heart sounds: No murmur heard.  Pulmonary:      Effort: No respiratory distress.   Abdominal:      General: There is no distension.      Tenderness: There is no abdominal tenderness.   Musculoskeletal:         General: Normal range of motion.   Skin:     General: Skin is warm.   Neurological:      Mental Status: He is alert and oriented to person, place, and time.      Cranial Nerves: No cranial nerve deficit.      Comments: Left-sided weakness-left upper extremity is weaker compared to left lower extremity.  Partially due to pain from the humeral fracture           Lines/Drains:        Telemetry:  Telemetry Orders (From admission, onward)               24 Hour Telemetry Monitoring  Continuous x 24 Hours (Telem)        Expiring   Question:  Reason for 24 Hour Telemetry  Answer:  Arrhythmias requiring acute medical intervention / PPM or ICD malfunction                     Telemetry Reviewed: Atrial fibrillation. HR averaging 68   Indication for Continued Telemetry Use: Acute CVA               Lab Results: I have reviewed the following results:   Results from last 7 days   Lab Units 01/09/25  0529   WBC Thousand/uL 8.53   HEMOGLOBIN g/dL 16.5   HEMATOCRIT % 46.4   PLATELETS Thousands/uL 195   SEGS PCT  % 70   LYMPHO PCT % 15   MONO PCT % 11   EOS PCT % 2     Results from last 7 days   Lab Units 01/09/25  0529 01/08/25  0458   SODIUM mmol/L 137 136   POTASSIUM mmol/L 4.1 4.2   CHLORIDE mmol/L 102 99   CO2 mmol/L 26 26   BUN mg/dL 23 16   CREATININE mg/dL 1.09 0.88   ANION GAP mmol/L 9 11   CALCIUM mg/dL 8.5 8.8   ALBUMIN g/dL  --  3.9   TOTAL BILIRUBIN mg/dL  --  1.24*   ALK PHOS U/L  --  52   ALT U/L  --  17   AST U/L  --  25   GLUCOSE RANDOM mg/dL 95 98     Results from last 7 days   Lab Units 01/08/25  0458   INR  1.01     Results from last 7 days   Lab Units 01/07/25  1305   POC GLUCOSE mg/dl 94     Results from last 7 days   Lab Units 01/08/25  0458   HEMOGLOBIN A1C % 5.3     Results from last 7 days   Lab Units 01/09/25  0529   PROCALCITONIN ng/ml <0.05       Recent Cultures (last 7 days):         Imaging Results Review: No pertinent imaging studies reviewed.      Last 24 Hours Medication List:     Current Facility-Administered Medications:     acetaminophen (TYLENOL) tablet 650 mg, Q6H PRN    aspirin chewable tablet 81 mg, Daily    atorvastatin (LIPITOR) tablet 40 mg, Daily With Dinner    folic acid (FOLVITE) tablet 1 mg, Daily    hydrALAZINE (APRESOLINE) tablet 25 mg, Q8H PRN    levothyroxine tablet 75 mcg, Early Morning    lidocaine (LIDODERM) 5 % patch 1 patch, Daily    [START ON 1/10/2025] lisinopril (ZESTRIL) tablet 20 mg, Daily    metoprolol tartrate (LOPRESSOR) tablet 25 mg, Q12H MALGORZATA    multivitamin-minerals (CENTRUM) tablet 1 tablet, Daily    nicotine (NICODERM CQ) 21 mg/24 hr TD 24 hr patch 1 patch, Daily    thiamine (VITAMIN B1) 500 mg in sodium chloride 0.9 % 50 mL IVPB, Daily, Last Rate: Stopped (01/09/25 0852)    Administrative Statements   Today, Patient Was Seen By: Leilani Gutierrez MD      **Please Note: This note may have been constructed using a voice recognition system.**

## 2025-01-09 NOTE — ASSESSMENT & PLAN NOTE
Patient had an admit nurse to fall in the emergency room.  Patient with left shoulder pain.  X-ray reviewed.  Noted to have acute nondisplaced fracture of the greater tuberosity of the humeral head  Or the evaluation appreciated.  Nonweightbearing left upper extremity.  Plan for conservative management with sling and repeat x-ray in 2 weeks and outpatient follow-up with orthopedic

## 2025-01-09 NOTE — ASSESSMENT & PLAN NOTE
Patient presenting with one-sided weakness, decreased sensation, dizziness, and blurry vision. Found to have multiple small infarcts on MRI. Non-compliant with Eliquis prior to arrival. Stroke likely 2/2 to thrombus iso afib.    Plan per neurology

## 2025-01-09 NOTE — PROGRESS NOTES
Progress Note - Neurology   Name: Jose Ramon Quiñonez Jr. 69 y.o. male I MRN: [id number]  [facility] Date of Admission: 1/7/2025   Date of Service: 1/9/2025 I Hospital Day: 2    Assessment & Plan  CVA (cerebral vascular accident) (HCC)  69 y.o. male with pertinent PMH of A-fib not compliant on Eliquis, uncontrolled hypertension, hyperlipidemia, RAUDEL noncompliant on CPAP, prior R cerebellar stroke approximately 3 years ago per patient report. Pt presenting with stroke sx of left upper extremity weakness and numbness, gait imbalance, LFD, dysarthria, lack of coordination, and episodes of waxing and waning blurry vision bilaterally. LKW several days ago. Initial BP Blood Pressure: (!) 231/124, FSBG POC Glucose: 94. NC CTH completed and CTA H/N performed, results as noted below. As a result of > 4.5 hours from time of symptom onset and Unclear time of onset outside appropriate time window patient was not determined to be a candidate for thrombolysis (TNK).     - Home Antiplatelet /Anticoagulants PTA: No antiplatelet or anticoagulants  - Stroke risk factors: HTN, HLD, Atrial Fibrillation, Prior Stroke/TIA, and RAUDEL  - Prior Stroke Hx: yes: embolic right cerebellum    Workup:  - CTH: New small infarct in the right occipital lobe that could be acute. No acute hemorrhage.   - CTA: Proximal occlusion of the P2 segment of the right PCA. Stable 3 mm ACOM aneurysm. Moderate stenosis cervical left ICA progressed from prior study. 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  - MRI: Recent infarctions of R thalamus, R occipital lobe and R temporal lobe.  Stable old R cerebellar infarct  - TTE: 65% EF, LA mildly dilated.  - Labs: Hemoglobin A1c 5.3 (1/8/2025), LDL 54 (1/7/2025)    Impression: 69 y.o. male with history of many uncontrolled stroke risk factors after removing himself from all medications approximately 6 months ago, currently on no AC for active A-fib, who presented with  many symptoms as noted above concerning for stroke, with findings on CTh and CTA HN showing a new infarct to the right occipital lobe appearing recent, suspect patient likely had CVA from medication noncompliance, etiology from cardioembolic or atheroembolic origin    Plan: Discussed plan with neurology attending, Dr. Garland  Admit along the stroke pathway with telemetry monitoring  Strict NPO prior to dysphagia screen   Frequent neuro checks per protocol.   If acute changes in exam, please obtain stat CT head and notify neurology team  BP Goals: Normotension of <130/80  Antiplatelet agents: ASA 81 mg following load, plavix loaded but dc  Anticoagulation: Hold until 1/10/25, then may restart AC  Statin: Start Atorvastatin 40 mg QHS  TTE, pending  Euthermic/Euglycemic  DVT PPx: per primary team, SCDs  PT/OT/ST/PMR evaluations when able  Stroke education and counseling  CPAP rec if tolerated  Rest of other care per primary team appreciated    Disposition: PT Recommendations -    Longstanding persistent atrial fibrillation (HCC)    HTN (hypertension)    Hypothyroid    RAUDEL (obstructive sleep apnea)    Aneurysm (HCC)    Unwitnessed fall    Hyperlipidemia    Leukocytosis    Status post total shoulder arthroplasty, left    Personal history of noncompliance with medical treatment and regimen    Nicotine dependence with current use    Alcohol dependence (HCC)    Psychosocial stressors    Closed nondisplaced fracture of greater tuberosity of left humerus      Recommendations for outpatient neurological follow up have yet to be determined.  I have discussed with Dr. Garland the above plan to treat pt. He agrees with the plan.    Subjective   Patient was seen and evaluated at bedside.  Patient was educated on importance of taking medications.  He will follow-up with new PCP upon discharge.    Review of Systems  paresis    Objective :  Temp:  [97.7 °F (36.5 °C)-98.9 °F (37.2 °C)] 98.1 °F (36.7 °C)  HR:  [68-90] 75  BP:  (134-158)/() 140/96  Resp:  [16-18] 16  SpO2:  [96 %-99 %] 98 %  O2 Device: None (Room air)    Physical Exam  Vitals and nursing note reviewed.   Constitutional:       General: He is not in acute distress.     Appearance: He is not diaphoretic.   HENT:      Head: Normocephalic and atraumatic.      Right Ear: External ear normal.      Left Ear: External ear normal.      Nose: Nose normal.      Mouth/Throat:      Pharynx: Oropharynx is clear.   Eyes:      General: Lids are normal. No scleral icterus.        Right eye: No discharge.         Left eye: No discharge.      Extraocular Movements: Extraocular movements intact.      Conjunctiva/sclera: Conjunctivae normal.      Pupils: Pupils are equal, round, and reactive to light.   Neurological:      Mental Status: He is alert.      Cranial Nerves: Dysarthria present.      Deep Tendon Reflexes: Reflexes are normal and symmetric.     Neurological Exam  Mental Status  Alert. Oriented to person, place and time. Mild dysarthria present. Follows two-step commands.    Cranial Nerves  CN I: Sense of smell is normal.  CN II: Visual fields full to confrontation.  CN III, IV, VI: Extraocular movements intact bilaterally. Normal lids and orbits bilaterally. Pupils equal round and reactive to light bilaterally.  CN V: Facial sensation is normal.  CN VII:  Right: There is no facial weakness.  Left: There is central facial weakness.    Motor  Normal muscle bulk throughout. No fasciculations present. Normal muscle tone. No abnormal involuntary movements. Strength is 5/5 in all four extremities except as noted. Left hemiparesis.  Left upper extremity deficits to strength distal > proximal w/ 4- to 4+ throughout, left lower extremity distally mild reduction to strength against resistance, mild LFD..    Sensory  Light touch abnormality: Left hemisensory loss.   Left sided numbness.    Reflexes  Deep tendon reflexes are 2+ and symmetric in all four extremities.    Coordination  Right:  Finger-to-nose normal. Heel-to-shin normal.Left: Finger-to-nose abnormality: Heel-to-shin normal.  Left FTN seems limited by strength and pain.        Lab Results: I have reviewed the following results:  Imaging Results Review: No pertinent imaging studies reviewed.  Other Study Results Review: No additional pertinent studies reviewed.    VTE Pharmacologic Prophylaxis: VTE covered by:    None

## 2025-01-09 NOTE — OCCUPATIONAL THERAPY NOTE
Occupational Therapy Evaluation     Patient Name: Jose Ramon Quiñonez Jr.  Today's Date: 1/9/2025  Problem List  Principal Problem:    CVA (cerebral vascular accident) ([facility])  Active Problems:    Longstanding persistent atrial fibrillation (HCC)    HTN (hypertension)    Hypothyroid    Aneurysm (HCC)    RUADEL (obstructive sleep apnea)    Unwitnessed fall    Hyperlipidemia    Leukocytosis    Status post total shoulder arthroplasty, left    Personal history of noncompliance with medical treatment and regimen    Nicotine dependence with current use    Alcohol dependence (HCC)    Psychosocial stressors    Closed nondisplaced fracture of greater tuberosity of left humerus    Past Medical History  Past Medical History:   Diagnosis Date    A-fib (HCC)     Disease of thyroid gland     Hypertension     Stroke (HCC)      Past Surgical History  Past Surgical History:   Procedure Laterality Date    ROTATOR CUFF REPAIR        01/09/25 0903   OT Last Visit   OT Visit Date 01/09/25   Note Type   Note type Evaluation   Pain Assessment   Pain Assessment Tool 0-10   Pain Score 10 - Worst Possible Pain   Pain Location/Orientation Orientation: Left;Location: Arm  (fractured area)   Hospital Pain Intervention(s) Repositioned;Emotional support;Rest   Restrictions/Precautions   Weight Bearing Precautions Per Order Yes   LUE Weight Bearing Per Order (S)  NWB ~ 2*Closed nondisplaced fracture of greater tuberosity of left humerus s/p fall.   Braces or Orthoses Sling   Other Precautions Cognitive;Chair Alarm;Bed Alarm;WBS;Impulsive;Telemetry;Fall Risk;Pain   Home Living   Type of Home House   Home Layout Two level;1/2 bath on main level  (with basement (friend rents))   Bathroom Shower/Tub Tub/shower unit   Bathroom Toilet Standard   Bathroom Equipment Shower chair   Home Equipment Walker;Cane;Grab bars   Prior Function   Level of [place] Independent with ADLs;Independent with IADLS   Lives With Family;Other (Comment)  (cousin, friend, a tenant  in basement))   Receives Help From Family  (Daughters~ live local, cousin)   IADLs Independent with driving;Independent with meal prep;Independent with medication management   Falls in the last 6 months 1 to 4  (at least one in ED)   Vocational Retired   Lifestyle   Autonomy I with ADL's/IaDL's, no AD with functional mobility +drives   Reciprocal Relationships daughters, cousin   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   Intrinsic Gratification listening to music, used to play in a band   General   Family/Caregiver Present Yes  (Daughter at end of session)   ADL   Eating Assistance 5  Supervision/Setup   Eating Deficit Setup   Grooming Assistance 3  Moderate Assistance   UB Bathing Assistance 3  Moderate Assistance   LB Bathing Assistance 2 moderate Assistance   UB Dressing Assistance 3  Moderate Assistance   LB Dressing Assistance 3 moderate Assistance   Toileting Assistance  3  Moderate Assistance   Bed Mobility   Supine to Sit 4  Minimal assistance   Additional items Assist x 1   Sit to Supine Unable to assess   Transfers   Sit to Stand 4  Minimal assistance   Additional items Assist x 1   Stand to Sit 4  Minimal assistance   Additional items Assist x 1   Additional Comments (-) AD, sling donned to left UE   Functional Mobility   Functional Mobility 4  Minimal assistance   Additional Comments min a without AD household distance functional mobility unsteady   Balance   Static Sitting Fair +   Dynamic Sitting Fair   Static Standing Fair -   Dynamic Standing Poor +   Ambulatory Poor +   Activity Tolerance   Activity Tolerance Patient limited by fatigue;Patient limited by pain   Medical Staff Made Aware PT servando due to the patient's co-morbidities, clinically unstable presentation, and present impairments which are a regression from the patient's baseline.    Nurse Made Aware RN cleared pt for therapy   RUE Assessment   RUE Assessment WFL  (right hand dominant)   LUE Assessment   LUE Assessment (NWB)   Hand  Function   Fine Motor Coordination Functional   Hand Function Comments finger to thumb sequencing   Sensation   Light Touch Partial deficits in the RUE  (pt reports numbness to hand (per pt improving since admission))   Vision-Basic Assessment   Current Vision No visual deficits   Patient Visual Report (Initially upon admission difficulty with visual attention,per pt  improved since admission.)   Vision - Complex Assessment   Acuity Able to read clock/calendar on wall without difficulty   Cognition   Overall Cognitive Status (S)  Impaired   Arousal/Participation Alert;Responsive;Cooperative   Attention Attends with cues to redirect   Orientation Level Oriented X4   Memory Decreased recall of precautions   Following Commands Follows one step commands with increased time or repetition   Comments pt pleasant and cooperative, good historian, impaired attention, safety awareness, impulsive with decreased reasoning/judgement. pt would benefit from a formal cogntiive assessment if discharges home, lives alone/drives.   Assessment   Limitation Decreased ADL status;Decreased UE ROM;Decreased UE strength;Decreased Safe judgement during ADL;Decreased cognition;Decreased endurance;Decreased fine motor control;Decreased self-care trans;Decreased high-level ADLs;Decreased sensation   Prognosis Good   Assessment Pt is a 69 y.o. male who was [facility] to [facility]7/[facility] with visual disturbance, LUE numbness/weakness, dysarthria, gait disturbance starting 4 days ago, felt dizzy  CVA (cerebral vascular accident) (HCC) CTH +New small infarct in the right occipital lobe that could be acute, MRI: Recent infarctions of R thalamus, R occipital lobe and R temporal lobe. Stable old R cerebellar infarct  . Patient  has a past medical history of A-fib (HCC), Disease of thyroid gland, Hypertension, and Stroke (HCC).   At baseline pt was completing I with ADL's/IADL's, no AD with functional mobility +drives. Pt lives with cousin  in a 2SH (friend tentant in basement) [place]. Currently pt requires min/mod a  for overall ADLS and S/min a without AD and sling to left UE for functional mobility/transfers. Pt currently presents with impairments in the following categories -steps to enter environment, limited home support, difficulty performing ADLS, difficulty performing IADLS , limited insight into deficits, and compliance activity tolerance, endurance, standing balance/tolerance, sitting balance/tolerance, UE strength, UE ROM, FMC, GMC, safety , judgement , and attention . These impairments, as well as pt's fatigue, pain, orthopedic restricitions , WBS , impulsivity, decreased caregiver support, risk for falls, and home environment  limit pt's ability to safely engage in all baseline areas of occupation, includingeating, grooming, bathing, dressing, toileting, functional mobility/transfers, community mobility, laundry , driving, house maintenance, medication management, meal prep, social participation , and leisure activities  From OT standpoint, recommend max level I upon D/C.  The patient's raw score on the -PAC Daily Activity Inpatient Short Form is 13. A raw score of less than 19 suggests the patient may benefit from discharge to post-acute rehabilitation services. Please refer to the recommendation of the Occupational Therapist for safe discharge planning. The patient's raw score on the -PAC Daily Activity Inpatient Short Form is 13. A raw score of less than 19 suggests the patient may benefit from discharge to post-acute rehabilitation services. Please refer to the recommendation of the Occupational Therapist for safe discharge planning. OT will continue to follow to address the below stated goals.   Goals   Patient Goals go home   LT Time Frame 10-14   Long Term Goal #1 see goals below   Plan   Treatment Interventions ADL retraining;Functional transfer training;UE strengthening/ROM;Endurance training;Cognitive  reorientation;Patient/family training;Equipment evaluation/education;Compensatory technique education;Continued evaluation;Energy conservation;Activityengagement   Goal Expiration Date 01/23/25   OT Frequency 3-5x/wk   Discharge Recommendation   Rehab Resource Intensity Level, OT I (Maximum Resource Intensity)   AM-PAC Daily Activity Inpatient   Lower Body Dressing 2   Bathing 2   Toileting 2   Upper Body Dressing 2   Grooming 2   Eating 3   Daily Activity Raw Score 13   Daily Activity Standardized Score (Calc for Raw Score >=11) 32.03   AM-PAC Applied Cognition Inpatient   Following a Speech/Presentation 3   Understanding Ordinary Conversation 4   Taking Medications 3   Remembering Where Things Are Placed or Put Away 4   Remembering List of 4-5 Errands 3   Taking Care of Complicated Tasks 2   Applied Cognition Raw Score 19   Applied Cognition Standardized Score 39.77   Barthel Index   Feeding 5   Bathing 0   Grooming Score 0   Dressing Score 5   Bladder Score 10   Bowels Score 10   Toilet Use Score 5   Transfers (Bed/Chair) Score 10   Mobility (Level Surface) Score 10   Stairs Score 5   Barthel Index Score 60   Modified Jd Scale   Modified Piatt Scale 3   End of Consult   Education Provided Yes;Family or social support of family present for education by provider  (NWB status to left UE/one hand compensatory techniques with LB dressing.)   Patient Position at End of Consult All needs within reach;Bed/Chair alarm activated;Bedside chair;Other (comment)  (daughter present)    Occupational Therapy Goals:    *Mod I with bed mobility to engage in functional tasks.  *Mod I Adl's after setup with use of AE PRN  *Mod I toileting and clothing management   *Mod I functional mobility and transfers to/from all surfaces with Fair + dynamic balance and safety for participation in dynamic adls and iadl tasks   *Assess DME needs   *Increase activity tolerance to 25-30 minutes for participation in adls and enjoyable  activities  *Pt to participate in further cognitive testing with good attention and participation to assist with safe d/c recommendations  *Mod I with Simulated IADL management task.  *Demonstrate good carryover of pt/family education and training with good tolerance for increased safety and independence with ADL's/ADl's.  *Pt will improve standing tolerance to 8-10 minutes with fair+ balancefor  functional tasks to increase I with toileting/transfers.  *Patient will demonstrate 100% carryover of energy conservation techniques t/o functional I/ADL/leisure tasks w/o cues s/p skilled education to increase endurance during functional tasks  *Patient will increase attention to  follow multistep instructions with no cueing to increase cognitive function and independence with tasks  Haley Fox OTR/L

## 2025-01-09 NOTE — PROGRESS NOTES
Progress Note - Cardiology   Name: Jose Ramon Quiñonez Jr. 69 y.o. male I MRN: [id number]  Unit/Bed#: [facility] 719-01 I Date of Admission: 1/7/2025   Date of Service: 1/9/2025 I Hospital Day: 2     Assessment & Plan  HTN (hypertension)  BP 230s/120s in ED 1/7 iso stroke; currently symptomatic w/ blurry vision, possibly due to stroke vs. HTN. No headache, CP, or SOB.  Home medications include metoprolol tartrate 25mg BID and lisinopril 10mg daily- patient endorses that he has been non-compliant with any of his medications for the past 5-6 months  S/p labetalol 10mg IV x3 in ED  BP remaining elevated despite restarting home metoprolol and lisinopril this AM as well as PRN hydralazine x1  BP today downtrending from 200s systolic to 160s  Goal BP per neuro- <130s/80s  Patient's home medications likely have not reached full therapeutic effect after restarting  Lisinopril increased to 20mg daily by primary team  C/w metoprolol tartrate 25mg BID and lisinopril 20mg daily  Will consider adding afterload-reducing agent if hypertension persists, due to elevated diastolic BPs  C/W PRN hydralazine to 25mg PO Q6H for systolic BP >160  Longstanding persistent atrial fibrillation (HCC)   Previous on eliquis 5mg BID and metoprolol tartrate 25mg BID, however patient has been non-compliant  Per neuro, recommend restarting Eliquis on 1/10 d/t recent stroke  C/W metoprolol     CVA (cerebral vascular accident) (HCC)  Patient presenting with one-sided weakness, decreased sensation, dizziness, and blurry vision. Found to have multiple small infarcts on MRI. Non-compliant with Eliquis prior to arrival. Stroke likely 2/2 to thrombus iso afib.    Plan per neurology  Hyperlipidemia  C/w atorvastatin  Nicotine dependence with current use  Spoke with patient about smoking cessation and risk of future stroke with concurrent cigarette smoking      Subjective     Patient seen and examined at bedside.  No acute events overnight. Patient currently complaining  of pain in his left shoulder. He is currently non-weightbearing per orthopedics. He states that the sensation in his L foot has improved but is still present to a lesser degree. He states that he has some dizziness when standing up too quickly, which goes away after a few seconds. His blurry vision has improved somewhat. Denies any headache, chest pain, shortness of breath, palpitations, fevers, or chills.  Objective     Vitals: Temp (24hrs), Av.5 °F (36.9 °C), Min:98.3 °F (36.8 °C), Max:98.9 °F (37.2 °C)  Current: Temperature: 98.9 °F (37.2 °C)  Patient Vitals for the past 24 hrs:   BP Temp Temp src Pulse Resp SpO2   25 0800 (!) 151/101 -- -- 89 -- --   25 0727 (!) 151/101 98.9 °F (37.2 °C) Oral 82 16 97 %   25 0244 (!) 136/106 -- -- 88 18 99 %   25 2106 (!) 134/106 98.4 °F (36.9 °C) Oral 86 16 96 %   25 1900 -- -- -- -- 17 --   25 1846 (!) 152/132 -- -- 90 -- 96 %   25 1515 158/100 98.3 °F (36.8 °C) -- 84 16 97 %   25 1203 169/99 -- -- 73 -- 97 %   25 1008 (!) 177/101 -- -- 73 -- 100 %    Body mass index is 25.85 kg/m².  Physical Exam:  Physical Exam  Constitutional:       Appearance: Normal appearance.   HENT:      Head: Normocephalic.      Mouth/Throat:      Mouth: Mucous membranes are moist.   Eyes:      Extraocular Movements: Extraocular movements intact.      Pupils: Pupils are equal, round, and reactive to light.   Cardiovascular:      Rate and Rhythm: Normal rate. Rhythm irregular.      Pulses: Normal pulses.      Heart sounds: Normal heart sounds. No murmur heard.     No friction rub. No gallop.   Pulmonary:      Effort: Pulmonary effort is normal. No respiratory distress.      Breath sounds: Normal breath sounds. No wheezing, rhonchi or rales.   Abdominal:      General: Abdomen is flat. Bowel sounds are normal. There is no distension.      Palpations: Abdomen is soft.      Tenderness: There is no abdominal tenderness. There is no guarding.       "Hernia: No hernia is present.   Musculoskeletal:         General: Signs of injury (Tenderness and decreased ROM L shoulder d/t pain) present.      Right lower leg: No edema.      Left lower leg: No edema.   Skin:     General: Skin is warm.      Capillary Refill: Capillary refill takes less than 2 seconds.   Neurological:      Mental Status: He is alert and oriented to person, place, and time.      Cranial Nerves: Cranial nerves 2-12 are intact.      Sensory: Sensory deficit (L foot decreased sensation) present.      Motor: Weakness (L hand  strength 4/5) present.      Comments: Blurry vision present. Patient endorses improvement         Invasive Devices       Peripheral Intravenous Line  Duration             Peripheral IV 01/08/25 Right;Ventral (anterior) Forearm <1 day                        Labs:   Results from last 7 days   Lab Units 01/09/25 0529 01/08/25 0458 01/07/25  1318   WBC Thousand/uL 8.53 10.57* 6.55   HEMOGLOBIN g/dL 16.5 16.1 17.2*   HEMATOCRIT % 46.4 44.8 49.1   PLATELETS Thousands/uL 195 185 193   SEGS PCT % 70 75 60   MONO PCT % 11 9 12   EOS PCT % 2 2 3      Results from last 7 days   Lab Units 01/09/25  0529 01/08/25  0458 01/07/25  1318   SODIUM mmol/L 137 136 137   POTASSIUM mmol/L 4.1 4.2 4.1   CHLORIDE mmol/L 102 99 98   CO2 mmol/L 26 26 29   BUN mg/dL 23 16 15   CREATININE mg/dL 1.09 0.88 1.02   CALCIUM mg/dL 8.5 8.8 9.2   ALK PHOS U/L  --  52 57   ALT U/L  --  17 20   AST U/L  --  25 23   MAGNESIUM mg/dL 1.9 1.9  --    PHOSPHORUS mg/dL 3.4  --   --    INR   --  1.01 0.93   PTT seconds  --  28 29   EGFR ml/min/1.73sq m 69 88 75     Results from last 7 days   Lab Units 01/08/25 0458 01/07/25  1318   INR  1.01 0.93   PTT seconds 28 29             No results found for: \"PHART\", \"DOE1DFS\", \"PO2ART\", \"TLG2SVB\", \"J3FLKBTE\", \"BEART\", \"SOURCE\"  No components found for: \"HIV1X2\"  No results found for: \"HAV\", \"HEPAIGM\", \"HEPBIGM\", \"HEPBCAB\", \"HBEAG\", \"HEPCAB\"  No results found for: \"SPEP\", " "\"UPEP\"   Lab Results   Component Value Date    HGBA1C 5.3 01/08/2025    HGBA1C 5.0 08/22/2020     No results found for: \"CHOL\"   Lab Results   Component Value Date    HDL 47 01/07/2025    HDL 54 08/22/2020      Lab Results   Component Value Date    LDLCALC 54 01/07/2025    LDLCALC 48 08/22/2020      Lab Results   Component Value Date    TRIG 74 01/07/2025    TRIG 92 08/22/2020     No components found for: \"PROCAL\"      Micro:      Urinalysis:  Lab Results   Component Value Date    ETOH <10 01/07/2025          Invalid input(s): \"URIBILINOGEN\"        Intake and Outputs:  I/O         01/07 0701  01/08 0700 01/08 0701  01/09 0700 01/09 0701  01/10 0700    P.O.   360    IV Piggyback 400  50    Total Intake(mL/kg) 400 (5.2)  410 (5.3)    Urine (mL/kg/hr)  400 (0.2) 175 (0.7)    Stool   0    Total Output  400 175    Net +400 -400 +235           Unmeasured Stool Occurrence   1 x          Nutrition:  Diet Cardiovascular; Sodium 2 GM  Radiology Results:   XR shoulder 2+ vw left   Final Result by Byron Menard MD (01/08 1345)      Acute nondisplaced fracture of the greater tuberosity of the humeral head      The study was marked in EPIC for immediate notification.         Computerized Assisted Algorithm (CAA) may have been used to analyze all applicable images.         Workstation performed: [id number]         MRI brain wo contrast   Final Result by Rocky James MD (01/08 1115)      1. Recent infarctions in the right thalamus, right occipital lobe, and right temporal lobe, correlating to some of the findings on yesterday's head CT. These discontiguous regions of acute/recent infarction are subtended by the right posterior cerebral    artery, correlating to the P2 segment of the right posterior cerebral artery occlusion seen on yesterday's CTA. There is no acute hemorrhagic conversion.   2. Mild chronic microangiopathic changes.   3. Stable appearance of old right cerebellar infarct.      Resident: Kel" Jermaine FERRER, the attending radiologist, have reviewed the images and agree with the final report above.      Workstation performed: [id number]         CTA head and neck with and without contrast   ED Interpretation by Heber Gore MD (01/07 1740)   Encephalomalacia of the right cerebellum from previous hemorrhagic stroke in 2020.      Final Result by E. Alec Schoenberger, MD (01/07 5648)      CT Brain: New small infarct in the right occipital lobe that could be acute. No acute hemorrhage.      CT Angiography: Proximal occlusion of the P2 segment of the right PCA.   Stable 3 mm ACOM aneurysm. Moderate stenosis cervical left ICA progressed from prior study.   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      I personally discussed this study with RAMIN BLANTON on 1/7/2025 6:56 PM.            Workstation performed: [id number]           Scheduled Medications:  aspirin, 81 mg, Daily  atorvastatin, 40 mg, Daily With Dinner  folic acid, 1 mg, Daily  levothyroxine, 75 mcg, Early Morning  lidocaine, 1 patch, Daily  [START ON 1/10/2025] lisinopril, 20 mg, Daily  metoprolol tartrate, 25 mg, Q12H MALGORZATA  multivitamin-minerals, 1 tablet, Daily  nicotine, 1 patch, Daily  thiamine, 500 mg, Daily      PRN MEDS:  acetaminophen, 650 mg, Q6H PRN  hydrALAZINE, 25 mg, Q8H PRN      Last 24 Hour Meds: :   Medication Administration - last 24 hours from 01/08/2025 1006 to 01/09/2025 1006         Date/Time Order Dose Route Action Action by     01/08/2025 1249 EST acetaminophen (TYLENOL) tablet 650 mg 650 mg Oral Given Eloise Guillermo RN     01/09/2025 0821 EST aspirin chewable tablet 81 mg 81 mg Oral Given Mariana Hays RN     01/09/2025 0519 EST levothyroxine tablet 75 mcg 75 mcg Oral Given Kimmy Mota     01/09/2025 0821 EST nicotine (NICODERM CQ) 21 mg/24 hr TD 24 hr patch 1 patch 1 patch Transdermal Medication Applied Mariana Hays RN     01/08/2025 1637 EST atorvastatin  (LIPITOR) tablet 40 mg 40 mg Oral Given Eloise Guillermo RN     01/09/2025 0821 EST metoprolol tartrate (LOPRESSOR) tablet 25 mg 25 mg Oral Given Mariana Hays RN     01/08/2025 2107 EST metoprolol tartrate (LOPRESSOR) tablet 25 mg 25 mg Oral Given Kimmy Vierano     01/09/2025 0821 EST lisinopril (ZESTRIL) tablet 10 mg 10 mg Oral Given Mariana Hays RN     01/08/2025 1637 EST thiamine tablet 100 mg 100 mg Oral Given Eloise Guillermo RN     01/09/2025 0821 EST lidocaine (LIDODERM) 5 % patch 1 patch 1 patch Topical Medication Applied Mariana Hays RN     01/09/2025 0821 EST folic acid (FOLVITE) tablet 1 mg 1 mg Oral Given Mariana Hays RN     01/09/2025 0821 EST multivitamin-minerals (CENTRUM) tablet 1 tablet 1 tablet Oral Given Mariana Hays RN     01/09/2025 0852 EST thiamine (VITAMIN B1) 500 mg in sodium chloride 0.9 % 50 mL IVPB 0 mg Intravenous Stopped Mariana Hays RN     01/09/2025 0822 EST thiamine (VITAMIN B1) 500 mg in sodium chloride 0.9 % 50 mL IVPB 500 mg Intravenous New Bag Mariana Hays RN            PLEASE NOTE:  This encounter was completed utilizing the [employer] Direct Speech Voice Recognition Software. Grammatical errors, random word insertions, pronoun errors and incomplete sentences are occasional consequences of the system due to software limitations, ambient noise and hardware issues.These may be missed by proof reading prior to affixing electronic signature. Any questions or concerns about the content, text or information contained within the body of this dictation should be directly addressed to the physician for clarification. Please do not hesitate to call me directly if you have any any questions or concerns.      Chris Castellano PGY-1  Internal Medicine

## 2025-01-09 NOTE — ASSESSMENT & PLAN NOTE
Spoke with patient about smoking cessation and risk of future stroke with concurrent cigarette smoking

## 2025-01-10 ENCOUNTER — DOCUMENTATION (OUTPATIENT)
Dept: NEUROLOGY | Facility: CLINIC | Age: 70
End: 2025-01-10

## 2025-01-10 PROCEDURE — NC001 PR NO CHARGE: Performed by: PSYCHIATRY & NEUROLOGY

## 2025-01-10 PROCEDURE — 97112 NEUROMUSCULAR REEDUCATION: CPT

## 2025-01-10 PROCEDURE — 99232 SBSQ HOSP IP/OBS MODERATE 35: CPT | Performed by: FAMILY MEDICINE

## 2025-01-10 PROCEDURE — 97116 GAIT TRAINING THERAPY: CPT

## 2025-01-10 PROCEDURE — 99232 SBSQ HOSP IP/OBS MODERATE 35: CPT | Performed by: INTERNAL MEDICINE

## 2025-01-10 RX ORDER — AMLODIPINE BESYLATE 5 MG/1
5 TABLET ORAL DAILY
Status: DISCONTINUED | OUTPATIENT
Start: 2025-01-10 | End: 2025-01-12

## 2025-01-10 RX ORDER — LANOLIN ALCOHOL/MO/W.PET/CERES
100 CREAM (GRAM) TOPICAL DAILY
Status: DISCONTINUED | OUTPATIENT
Start: 2025-01-11 | End: 2025-01-14 | Stop reason: HOSPADM

## 2025-01-10 RX ADMIN — HEPARIN SODIUM 5000 UNITS: 5000 INJECTION, SOLUTION INTRAVENOUS; SUBCUTANEOUS at 06:16

## 2025-01-10 RX ADMIN — APIXABAN 5 MG: 5 TABLET, FILM COATED ORAL at 08:56

## 2025-01-10 RX ADMIN — LISINOPRIL 20 MG: 20 TABLET ORAL at 08:56

## 2025-01-10 RX ADMIN — METOPROLOL TARTRATE 25 MG: 25 TABLET, FILM COATED ORAL at 21:05

## 2025-01-10 RX ADMIN — THIAMINE HYDROCHLORIDE 500 MG: 100 INJECTION, SOLUTION INTRAMUSCULAR; INTRAVENOUS at 09:06

## 2025-01-10 RX ADMIN — FOLIC ACID 1 MG: 1 TABLET ORAL at 08:57

## 2025-01-10 RX ADMIN — LIDOCAINE 1 PATCH: 50 PATCH TOPICAL at 08:58

## 2025-01-10 RX ADMIN — APIXABAN 5 MG: 5 TABLET, FILM COATED ORAL at 21:05

## 2025-01-10 RX ADMIN — HYDRALAZINE HYDROCHLORIDE 25 MG: 25 TABLET ORAL at 00:02

## 2025-01-10 RX ADMIN — ACETAMINOPHEN 650 MG: 325 TABLET, FILM COATED ORAL at 15:13

## 2025-01-10 RX ADMIN — NICOTINE 1 PATCH: 21 PATCH, EXTENDED RELEASE TRANSDERMAL at 08:56

## 2025-01-10 RX ADMIN — METOPROLOL TARTRATE 25 MG: 25 TABLET, FILM COATED ORAL at 08:57

## 2025-01-10 RX ADMIN — LEVOTHYROXINE SODIUM 75 MCG: 75 TABLET ORAL at 06:16

## 2025-01-10 RX ADMIN — ATORVASTATIN CALCIUM 40 MG: 40 TABLET, FILM COATED ORAL at 17:02

## 2025-01-10 RX ADMIN — Medication 1 TABLET: at 08:57

## 2025-01-10 RX ADMIN — ASPIRIN 81 MG CHEWABLE TABLET 81 MG: 81 TABLET CHEWABLE at 08:57

## 2025-01-10 RX ADMIN — ACETAMINOPHEN 650 MG: 325 TABLET, FILM COATED ORAL at 09:05

## 2025-01-10 RX ADMIN — AMLODIPINE BESYLATE 5 MG: 5 TABLET ORAL at 15:06

## 2025-01-10 NOTE — ASSESSMENT & PLAN NOTE
Pt reports psychosocial stressor of recent divorce.  Pt at risk for depression  Recommend cognitive therapy   At risk for increased confusion/delirium, restlessness, agitation, and fall - continue to monitor   Monitor neuro-exam, wakefulness, mood, cognition, insight into deficits and safety awareness   Overstimulation precautions, frequent re-orientation, re-direction, re-assurance  Optimal mood, pain, and sleep management  Plan for possible ARC/IRF disposition

## 2025-01-10 NOTE — ASSESSMENT & PLAN NOTE
Patient with hypertensive emergency at the time of presentation.  Restarted back on metoprolol and lisinopril.  Continue lisinopril 20 mg due to persistent hypertension.    Monitor blood pressure

## 2025-01-10 NOTE — RESTORATIVE TECHNICIAN NOTE
Restorative Technician Note      Patient Name: Jose Ramon Quiñonez Jr.     Note Type: Mobility  Patient Position Upon Consult: Supine  Activity Performed: Ambulated; Dangled; Stood  Assistive Device: Other (Comment) (None, L UE arm sling on. Assisted PT [place].)  Education Provided: Yes  Patient Position at End of Consult: Supine; All needs within reach; Bed/Chair alarm activated    Yandel WILLIS, Restorative Technician,

## 2025-01-10 NOTE — ASSESSMENT & PLAN NOTE
69 y.o. male with pertinent PMH of A-fib not compliant on Eliquis, uncontrolled hypertension, hyperlipidemia, RAUDEL noncompliant on CPAP, prior R cerebellar stroke approximately 3 years ago per patient report. Pt presenting with stroke sx of left upper extremity weakness and numbness, gait imbalance, LFD, dysarthria, lack of coordination, and episodes of waxing and waning blurry vision bilaterally. LKW several days ago. Initial BP Blood Pressure: (!) 231/124, FSBG POC Glucose: 94. NC CTH completed and CTA H/N performed, results as noted below. As a result of > 4.5 hours from time of symptom onset and Unclear time of onset outside appropriate time window patient was not determined to be a candidate for thrombolysis (TNK).     - Home Antiplatelet /Anticoagulants PTA: No antiplatelet or anticoagulants  - Stroke risk factors: HTN, HLD, Atrial Fibrillation, Prior Stroke/TIA, and RAUDEL  - Prior Stroke Hx: yes: embolic right cerebellum    Workup:  - CTH: New small infarct in the right occipital lobe that could be acute. No acute hemorrhage.   - CTA: Proximal occlusion of the P2 segment of the right PCA. Stable 3 mm ACOM aneurysm. Moderate stenosis cervical left ICA progressed from prior study. 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  - MRI: Recent infarctions of R thalamus, R occipital lobe and R temporal lobe.  Stable old R cerebellar infarct  - TTE: 65% EF, LA mildly dilated.  - Labs: Hemoglobin A1c 5.3 (1/8/2025), LDL 54 (1/7/2025)    Impression: 69 y.o. male with history of many uncontrolled stroke risk factors after removing himself from all medications approximately 6 months ago, currently on no AC for active A-fib, who presented with many symptoms as noted above concerning for stroke, with findings on CTh and CTA HN showing a new infarct to the right occipital lobe appearing recent, suspect patient likely had CVA from medication noncompliance, etiology from cardioembolic  or atheroembolic origin    Plan: Discussed plan with neurology attending, Dr. Garland  Admit along the stroke pathway with telemetry monitoring  Strict NPO prior to dysphagia screen   Frequent neuro checks per protocol.   If acute changes in exam, please obtain stat CT head and notify neurology team  BP Goals: Normotension of <130/80  Antiplatelet agents: ASA 81 mg following load, plavix loaded but dc  Anticoagulation: Restart Eliquis 5 mg q12h  Statin: Start Atorvastatin 40 mg QHS  Euthermic/Euglycemic  DVT PPx: per primary team, SCDs  PT/OT/ST/PMR evaluations when able  Stroke education and counseling  CPAP rec if tolerated  Rest of other care per primary team appreciated    Disposition: PT Recommendations - Rehab Resource Intensity Level, PT: I (Maximum Resource Intensity) (pending progress, may progress to level 3)  No further management from Neurology. Please call with questions or concerns

## 2025-01-10 NOTE — PLAN OF CARE
Problem: PHYSICAL THERAPY ADULT  Goal: Performs mobility at highest level of function for planned discharge setting.  See evaluation for individualized goals.  Description: Treatment/Interventions: Functional transfer training, LE strengthening/ROM, Therapeutic exercise, Endurance training, Elevations, Cognitive reorientation, Patient/family training, Equipment eval/education, Bed mobility, Gait training, Spoke to nursing, Spoke to case management, OT          See flowsheet documentation for full assessment, interventions and recommendations.  Outcome: Progressing  Note: Prognosis: Good  Problem List: Decreased strength, Decreased endurance, Decreased range of motion, Impaired balance, Decreased mobility, Impaired vision  Assessment: Pt agreeable to participate in PT session. Pt performed functional mobility and therex as outlined above. Progressing towards goals. More steady since IE yesterday however a few mild LOB with dynamic movements. FGA score right at fall risk cut off (22/30, <22 is fall risk). Main impairments will likely be related to ADLS as pt is NWB LUE s/p fx. Pt left supine in bed with bed alarm, call bell, phone, and all personal needs within reach. Pt will continue to benefit from skilled acute care PT to further address their functional mobility limitations.  Barriers to Discharge: Decreased caregiver support, Inaccessible home environment     Rehab Resource Intensity Level, PT: I (Maximum Resource Intensity) (vs level 3 pending ARC acceptance)    See flowsheet documentation for full assessment.

## 2025-01-10 NOTE — ASSESSMENT & PLAN NOTE
Rate controlled  Continue metoprolol  Patient was not taking any medication for the past 6 months or so. Has not seen a PCP since 2022  Restart Eliquis 1/10/25

## 2025-01-10 NOTE — PROGRESS NOTES
Progress Note - Neurology   Name: Jose Ramon Quiñonez Jr. 69 y.o. male I MRN: [id number]  [facility] Date of Admission: 1/7/2025   Date of Service: 1/10/2025 I Hospital Day: 3    Assessment & Plan  CVA (cerebral vascular accident) (HCC)  69 y.o. male with pertinent PMH of A-fib not compliant on Eliquis, uncontrolled hypertension, hyperlipidemia, RAUDEL noncompliant on CPAP, prior R cerebellar stroke approximately 3 years ago per patient report. Pt presenting with stroke sx of left upper extremity weakness and numbness, gait imbalance, LFD, dysarthria, lack of coordination, and episodes of waxing and waning blurry vision bilaterally. LKW several days ago. Initial BP Blood Pressure: (!) 231/124, FSBG POC Glucose: 94. NC CTH completed and CTA H/N performed, results as noted below. As a result of > 4.5 hours from time of symptom onset and Unclear time of onset outside appropriate time window patient was not determined to be a candidate for thrombolysis (TNK).     - Home Antiplatelet /Anticoagulants PTA: No antiplatelet or anticoagulants  - Stroke risk factors: HTN, HLD, Atrial Fibrillation, Prior Stroke/TIA, and RAUDEL  - Prior Stroke Hx: yes: embolic right cerebellum    Workup:  - CTH: New small infarct in the right occipital lobe that could be acute. No acute hemorrhage.   - CTA: Proximal occlusion of the P2 segment of the right PCA. Stable 3 mm ACOM aneurysm. Moderate stenosis cervical left ICA progressed from prior study. 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  - MRI: Recent infarctions of R thalamus, R occipital lobe and R temporal lobe.  Stable old R cerebellar infarct  - TTE: 65% EF, LA mildly dilated.  - Labs: Hemoglobin A1c 5.3 (1/8/2025), LDL 54 (1/7/2025)    Impression: 69 y.o. male with history of many uncontrolled stroke risk factors after removing himself from all medications approximately 6 months ago, currently on no AC for active A-fib, who presented  with many symptoms as noted above concerning for stroke, with findings on CTh and CTA HN showing a new infarct to the right occipital lobe appearing recent, suspect patient likely had CVA from medication noncompliance, etiology from cardioembolic or atheroembolic origin    Plan: Discussed plan with neurology attending, Dr. Garland  Admit along the stroke pathway with telemetry monitoring  Strict NPO prior to dysphagia screen   Frequent neuro checks per protocol.   If acute changes in exam, please obtain stat CT head and notify neurology team  BP Goals: Normotension of <130/80  Antiplatelet agents: ASA 81 mg following load, plavix loaded but dc  Anticoagulation: Restart Eliquis 5 mg q12h  Statin: Start Atorvastatin 40 mg QHS  Euthermic/Euglycemic  DVT PPx: per primary team, SCDs  PT/OT/ST/PMR evaluations when able  Stroke education and counseling  CPAP rec if tolerated  Rest of other care per primary team appreciated    Disposition: PT Recommendations - Rehab Resource Intensity Level, PT: I (Maximum Resource Intensity) (pending progress, may progress to level 3)  No further management from Neurology. Please call with questions or concerns    Longstanding persistent atrial fibrillation (HCC)    HTN (hypertension)    Hypothyroid    RAUDEL (obstructive sleep apnea)    Aneurysm (HCC)    Unwitnessed fall    Hyperlipidemia    Leukocytosis    Status post total shoulder arthroplasty, left    Personal history of noncompliance with medical treatment and regimen    Nicotine dependence with current use    Alcohol dependence (HCC)    Psychosocial stressors    Closed nondisplaced fracture of greater tuberosity of left humerus      Jose Ramon Quiñonez Jr. will need follow up in in 6 weeks with general attending or advance practitioner. He will not require outpatient neurological testing.  I have discussed with Dr. Garland the above plan to treat pt. He agrees with the plan.    Subjective   Patient remains without any new neurologic deficits,  feels his speech continues to improve.      Review of Systems  Paresis LUE    Objective :  Temp:  [97.7 °F (36.5 °C)-98.5 °F (36.9 °C)] 97.7 °F (36.5 °C)  HR:  [68-87] 87  BP: (143-167)/() 159/102  Resp:  [16] 16  SpO2:  [95 %-98 %] 98 %  O2 Device: None (Room air)    Physical Exam  Vitals and nursing note reviewed.   Constitutional:       General: He is not in acute distress.     Appearance: He is not diaphoretic.   HENT:      Head: Normocephalic and atraumatic.      Right Ear: External ear normal.      Left Ear: External ear normal.      Nose: Nose normal.      Mouth/Throat:      Pharynx: Oropharynx is clear.   Eyes:      General: Lids are normal. No scleral icterus.        Right eye: No discharge.         Left eye: No discharge.      Extraocular Movements: Extraocular movements intact.      Conjunctiva/sclera: Conjunctivae normal.      Pupils: Pupils are equal, round, and reactive to light.   Neurological:      Mental Status: He is alert.      Cranial Nerves: Dysarthria present.      Deep Tendon Reflexes: Reflexes are normal and symmetric.     Neurological Exam  Mental Status  Alert. Oriented to person, place and time. Mild dysarthria present.    Cranial Nerves  CN I: Sense of smell is normal.  CN II: Visual fields full to confrontation.  CN III, IV, VI: Extraocular movements intact bilaterally. Normal lids and orbits bilaterally. Pupils equal round and reactive to light bilaterally.  CN V: Facial sensation is normal.  CN VII:  Right: There is no facial weakness.  Left: There is central facial weakness.    Motor  Normal muscle bulk throughout. No fasciculations present. Normal muscle tone. No abnormal involuntary movements. Strength is 5/5 in all four extremities except as noted. Left hemiparesis.  Left upper extremity deficits to strength distal > proximal w/ 4- to 4+ throughout, left lower extremity distally mild reduction to strength against resistance, mild LFD..    Sensory  Light touch abnormality:  Left hemisensory loss.   Left sided numbness.    Reflexes  Deep tendon reflexes are 2+ and symmetric in all four extremities.    Coordination  Right: Finger-to-nose normal. Heel-to-shin normal.Left: Finger-to-nose abnormality: Heel-to-shin normal.  Left FTN seems limited by strength and pain.        Lab Results: I have reviewed the following results:  Imaging Results Review: No pertinent imaging studies reviewed.  Other Study Results Review: No additional pertinent studies reviewed.    VTE Pharmacologic Prophylaxis: VTE covered by:  apixaban, Oral

## 2025-01-10 NOTE — ASSESSMENT & PLAN NOTE
Presented with visual disturbance, LUE numbness/weakness, dysarthria, gait disturbance that started 4 days ago, & felt dizzy  Multiple stroke risk factors including afib not on AC, untreated HTN due to noncompliance with meds, prior history of embolic stroke R cerebellar  CTH:New small infarct in the right occipital lobe that could be acute. No acute hemorrhage.   CTA: Proximal occlusion of the P2 segment of the right PCA. Stable 3 mm ACOM aneurysm. Moderate stenosis cervical left ICA progressed from prior study. 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   Neurology following  MRI brain results reviewed-recent infractions in the right thalamus, and right occipital lobe, right temporal lobe.  This is discontinued because regions of acute/recent infractions are subtended by the right posterior cerebral artery correlating to the P2 segment of the right posterior cerebral artery occlusion seen on yesterday's CTA.  No acute hemorrhagic conversion  Echo reviewed - ejection fraction 65%.  Systolic function is normal.  ASA, statin  Neuro checks, stroke pathway  Goal normotension SBP<130/80

## 2025-01-10 NOTE — CASE MANAGEMENT
Case Management Discharge Planning Note    Patient name Jose Ramon Quiñonez Jr.  Location [facility] 723/[facility] 723-01 MRN [id number]  : 1955 Date 1/10/2025       Current Admission Date: 2025  Current Admission Diagnosis:CVA (cerebral vascular accident) (HCC)   Patient Active Problem List    Diagnosis Date Noted Date Diagnosed    RAUDEL (obstructive sleep apnea) 2025     Unwitnessed fall 2025     Hyperlipidemia 2025     Leukocytosis 2025     Status post total shoulder arthroplasty, left 2025     Personal history of noncompliance with medical treatment and regimen 2025     Nicotine dependence with current use 2025     Alcohol dependence (HCC) 2025     Psychosocial stressors 2025     Closed nondisplaced fracture of greater tuberosity of left humerus 2025     Hypothyroid 2020     Aneurysm (HCC) 2020     Longstanding persistent atrial fibrillation (HCC) 2020     HTN (hypertension) 2020     CVA (cerebral vascular accident) (HCC) 2020       LOS (days): 3  Geometric Mean LOS (GMLOS) (days): 2.8  Days to GMLOS:0.2     OBJECTIVE:  Risk of Unplanned Readmission Score: 9.69         Current admission status: Inpatient   Preferred Pharmacy:   [id number] - [place] - [address]  [address]  [place] [address]  Phone: [phone] Fax: [phone]    [facility] - [place] - [address]  [address]  [place] [address]  Phone: [phone] Fax: [phone]    [id number] - [place] - [address]  [address]  [place] [address]  Phone: [phone] Fax: [phone]    Primary Care Provider: Margarito Grant MD    Primary Insurance: MEDICARE  Secondary Insurance:     DISCHARGE DETAILS:  CM spoke with pt and daughter Alicia Sotelo   [phone] bedside. Pt in agreement with [facility]. CM notified [facility] in [place] regarding preference   -

## 2025-01-10 NOTE — ASSESSMENT & PLAN NOTE
Lab Results   Component Value Date    CHOLESTEROL 116 01/07/2025    CHOLESTEROL 120 08/22/2020    TRIG 74 01/07/2025    TRIG 92 08/22/2020    HDL 47 01/07/2025    HDL 54 08/22/2020    LDLCALC 54 01/07/2025    LDLCALC 48 08/22/2020   Continue to monitor

## 2025-01-10 NOTE — ASSESSMENT & PLAN NOTE
Patient had an unwitnessed fall in the emergency room. Patient with left shoulder pain.  X-ray reviewed. Noted to have acute nondisplaced fracture of the greater tuberosity of the humeral head  Ortho recommendations   Nonweightbearing left upper extremity.   Okay for range of motion and activity to tolerance.   Plan for conservative management with sling and repeat x-ray in 2 weeks   Plan for outpatient follow-up with orthopedic

## 2025-01-10 NOTE — PROGRESS NOTES
Progress Note - Hospitalist   Name: Jose Ramon Quiñonez Jr. 69 y.o. male I MRN: [id number]  Unit/Bed#: [facility] 723-01 I Date of Admission: 1/7/2025   Date of Service: 1/10/2025 I Hospital Day: 3    Assessment & Plan  CVA (cerebral vascular accident) (HCC)  Presented with visual disturbance, LUE numbness/weakness, dysarthria, gait disturbance that started 4 days ago, & felt dizzy  Multiple stroke risk factors including afib not on AC, untreated HTN due to noncompliance with meds, prior history of embolic stroke R cerebellar  CTH:New small infarct in the right occipital lobe that could be acute. No acute hemorrhage.   CTA: Proximal occlusion of the P2 segment of the right PCA. Stable 3 mm ACOM aneurysm. Moderate stenosis cervical left ICA progressed from prior study. 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   Neurology following  MRI brain results reviewed-recent infractions in the right thalamus, and right occipital lobe, right temporal lobe.  This is discontinued because regions of acute/recent infractions are subtended by the right posterior cerebral artery correlating to the P2 segment of the right posterior cerebral artery occlusion seen on yesterday's CTA.  No acute hemorrhagic conversion  Echo reviewed - ejection fraction 65%.  Systolic function is normal.  ASA, statin  Neuro checks, stroke pathway  Goal normotension SBP<130/80  Longstanding persistent atrial fibrillation (HCC)  Rate controlled  Continue metoprolol  Patient was not taking any medication for the past 6 months or so. Has not seen a PCP since 2022  Restart Eliquis 1/10/25  HTN (hypertension)  Patient with hypertensive emergency at the time of presentation.  Restarted back on metoprolol and lisinopril.  Continue lisinopril 20 mg due to persistent hypertension.    Monitor blood pressure  Hypothyroid  Continue levothyroxine-repeat thyroid studies in 4 to 6 weeks as outpatient  RAUDEL (obstructive sleep apnea)  CPAP  "HS  Aneurysm (HCC)  CTA head & neck w/ & w/o contrast: \"CT Brain: New small infarct in the right occipital lobe that could be acute. No acute hemorrhage. CT Angiography: Proximal occlusion of the P2 segment of the right PCA. Stable 3 mm ACOM aneurysm. Moderate stenosis cervical left ICA progressed from prior study. 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  Unwitnessed fall  Patient had an unwitnessed fall in the emergency room. Patient with left shoulder pain.  X-ray reviewed. Noted to have acute nondisplaced fracture of the greater tuberosity of the humeral head  Ortho recommendations   Nonweightbearing left upper extremity.   Okay for range of motion and activity to tolerance.   Plan for conservative management with sling and repeat x-ray in 2 weeks   Plan for outpatient follow-up with orthopedic  Hyperlipidemia  Lab Results   Component Value Date    CHOLESTEROL 116 01/07/2025    CHOLESTEROL 120 08/22/2020    TRIG 74 01/07/2025    TRIG 92 08/22/2020    HDL 47 01/07/2025    HDL 54 08/22/2020    LDLCALC 54 01/07/2025    LDLCALC 48 08/22/2020   Continue to monitor  Leukocytosis  Resolved  Personal history of noncompliance with medical treatment and regimen  Pt reports noncompliance with medications for the last several months.  Pt has not seen PCP for medication refills  Plan to set up PCP   Nicotine dependence with current use  Pt reports nicotine dependence   0.5 packs/day every day cigarette smoker  Nicotine patch ordered  Promote smoke cessation  Alcohol dependence (HCC)  Family reported that patient drinks at least a case of beer a day.  Patient only endorses 5-6 beers a day. Was given high-dose thiamine  CIWA protocol in place - Has not required any Ativan.   Continue IV thiamine 500 mg daily then PO thiamine 100 mg daily  Continue folic acid 1 mg daily  Psychosocial stressors  Pt reports " psychosocial stressor of recent divorce.  Pt at risk for depression  Recommend cognitive therapy   At risk for increased confusion/delirium, restlessness, agitation, and fall - continue to monitor   Monitor neuro-exam, wakefulness, mood, cognition, insight into deficits and safety awareness   Overstimulation precautions, frequent re-orientation, re-direction, re-assurance  Optimal mood, pain, and sleep management  Plan for possible ARC/IRF disposition  Closed nondisplaced fracture of greater tuberosity of left humerus  As above  Status post total shoulder arthroplasty, left  Per pt - had a hx of L TSA but had excellent recovery.  Patient reports falling in ED during this admission, resulting in new pain and decreased ROM  +Nemeg, dee, ++Speeds, some pain/weakness on resisted ER>IR  As above     VTE Pharmacologic Prophylaxis: VTE Score: 8 High Risk (Score >/= 5) - Pharmacological DVT Prophylaxis Ordered: apixaban (Eliquis). Sequential Compression Devices Ordered.    Mobility:   Basic Mobility Inpatient Raw Score: 18  JH-HLM Goal: 6: Walk 10 steps or more  JH-HLM Achieved: 7: Walk 25 feet or more  JH-HLM Goal achieved. Continue to encourage appropriate mobility.    Patient Centered Rounds: I performed bedside rounds with nursing staff today.   Discussions with Specialists or Other Care Team Provider: D/w nurse    Education and Discussions with Family / Patient: Updated  (daughter) at bedside.    Current Length of Stay: 3 day(s)  Current Patient Status: Inpatient   Certification Statement: The patient will continue to require additional inpatient hospital stay due to CVA & persistent HTN requiring medications  Discharge Plan: Anticipate discharge in 24-48 hrs to discharge location to be determined pending rehab evaluations.    Code Status: Level 1 - Full Code    Subjective   Pt is resting in bed. Patient denies any pain, fever, chills, CP, SOB, N/V, or tingling. He continues to report decreased  sensation from his left wrist to his fingertips and in his left ankle. He does report improvement in the decreased sensation from yesterday. Pt continues to report discomfort in his left shoulder - pt educated to use sling.    Objective :  Temp:  [97.5 °F (36.4 °C)-98.2 °F (36.8 °C)] 97.5 °F (36.4 °C)  HR:  [68-87] 75  BP: (129-167)/() 129/92  Resp:  [16] 16  SpO2:  [95 %-98 %] 95 %  O2 Device: None (Room air)    Body mass index is 25.85 kg/m².     Input and Output Summary (last 24 hours):     Intake/Output Summary (Last 24 hours) at 1/10/2025 1557  Last data filed at 1/10/2025 1201  Gross per 24 hour   Intake 480 ml   Output 900 ml   Net -420 ml       Physical Exam  Constitutional:       General: He is not in acute distress.     Appearance: He is not ill-appearing.   Cardiovascular:      Rate and Rhythm: Normal rate and regular rhythm.      Pulses: Normal pulses.      Heart sounds: Normal heart sounds. No murmur heard.  Pulmonary:      Effort: Pulmonary effort is normal. No respiratory distress.      Breath sounds: Normal breath sounds. No wheezing or rales.   Abdominal:      General: Bowel sounds are normal. There is no distension.      Palpations: Abdomen is soft.      Tenderness: There is no abdominal tenderness.   Musculoskeletal:         General: No swelling.      Left shoulder: Tenderness present. Decreased range of motion. Decreased strength.      Left hand: Decreased sensation.        Arms:       Right lower leg: No edema.      Left lower leg: No edema.        Legs:       Comments: Decreased sensation   Skin:     General: Skin is warm and dry.      Findings: No erythema or rash.   Neurological:      General: No focal deficit present.      Mental Status: He is alert and oriented to person, place, and time. Mental status is at baseline.      GCS: GCS eye subscore is 4. GCS verbal subscore is 5. GCS motor subscore is 6.      Sensory: Sensory deficit present.      Motor: Weakness present.      Comments: L  arm weakness   Psychiatric:         Mood and Affect: Mood normal.       Lines/Drains:      Telemetry:  Telemetry Orders (From admission, onward)               24 Hour Telemetry Monitoring  Continuous x 24 Hours (Telem)        Expiring   Question:  Reason for 24 Hour Telemetry  Answer:  Arrhythmias requiring acute medical intervention / PPM or ICD malfunction                     Telemetry Reviewed:  Pt not connected.  Indication for Continued Telemetry Use: No indication for continued use. Will discontinue.                Lab Results: I have reviewed the following results:   Results from last 7 days   Lab Units 01/09/25  0529   WBC Thousand/uL 8.53   HEMOGLOBIN g/dL 16.5   HEMATOCRIT % 46.4   PLATELETS Thousands/uL 195   SEGS PCT % 70   LYMPHO PCT % 15   MONO PCT % 11   EOS PCT % 2     Results from last 7 days   Lab Units 01/09/25  0529 01/08/25  0458   SODIUM mmol/L 137 136   POTASSIUM mmol/L 4.1 4.2   CHLORIDE mmol/L 102 99   CO2 mmol/L 26 26   BUN mg/dL 23 16   CREATININE mg/dL 1.09 0.88   ANION GAP mmol/L 9 11   CALCIUM mg/dL 8.5 8.8   ALBUMIN g/dL  --  3.9   TOTAL BILIRUBIN mg/dL  --  1.24*   ALK PHOS U/L  --  52   ALT U/L  --  17   AST U/L  --  25   GLUCOSE RANDOM mg/dL 95 98     Results from last 7 days   Lab Units 01/08/25  0458   INR  1.01     Results from last 7 days   Lab Units 01/07/25  1305   POC GLUCOSE mg/dl 94     Results from last 7 days   Lab Units 01/08/25  0458   HEMOGLOBIN A1C % 5.3     Results from last 7 days   Lab Units 01/09/25  0529   PROCALCITONIN ng/ml <0.05       Recent Cultures (last 7 days):         Imaging Results Review: No pertinent imaging studies reviewed.  Other Study Results Review: No additional pertinent studies reviewed.    Last 24 Hours Medication List:     Current Facility-Administered Medications:     acetaminophen (TYLENOL) tablet 650 mg, Q6H PRN    amLODIPine (NORVASC) tablet 5 mg, Daily    apixaban (ELIQUIS) tablet 5 mg, Q12H    aspirin chewable tablet 81 mg, Daily     atorvastatin (LIPITOR) tablet 40 mg, Daily With Dinner    folic acid (FOLVITE) tablet 1 mg, Daily    hydrALAZINE (APRESOLINE) tablet 25 mg, Q8H PRN    levothyroxine tablet 75 mcg, Early Morning    lidocaine (LIDODERM) 5 % patch 1 patch, Daily    lisinopril (ZESTRIL) tablet 20 mg, Daily    metoprolol tartrate (LOPRESSOR) tablet 25 mg, Q12H MALGORZATA    multivitamin-minerals (CENTRUM) tablet 1 tablet, Daily    nicotine (NICODERM CQ) 21 mg/24 hr TD 24 hr patch 1 patch, Daily    thiamine (VITAMIN B1) 500 mg in sodium chloride 0.9 % 50 mL IVPB, Daily, Last Rate: 500 mg (01/10/25 0906)    Administrative Statements   Today, Patient Was Seen By: ROSALINA Jeffries  I have spent a total time of 25 minutes in caring for this patient on the day of the visit/encounter including Risks and benefits of tx options, Instructions for management, Patient and family education, Importance of tx compliance, Risk factor reductions, Impressions, Counseling / Coordination of care, Documenting in the medical record, Reviewing / ordering tests, medicine, procedures  , Obtaining or reviewing history  , and Communicating with other healthcare professionals .    **Please Note: This note may have been constructed using a voice recognition system.**

## 2025-01-10 NOTE — PROGRESS NOTES
Progress Note - Cardiology   Name: Jose Ramon Quiñonez Jr. 69 y.o. male I MRN: [id number]  Unit/Bed#: [facility] 723-01 I Date of Admission: 1/7/2025   Date of Service: 1/10/2025 I Hospital Day: 3     Assessment & Plan  HTN (hypertension)  BP 230s/120s in ED 1/7 iso stroke; currently symptomatic w/ blurry vision, possibly due to stroke vs. HTN. No headache, CP, or SOB.  Home medications include metoprolol tartrate 25mg BID and lisinopril 10mg daily- patient endorses that he has been non-compliant with any of his medications for the past 5-6 months  S/p labetalol 10mg IV x3 in ED  BP remaining elevated despite restarting home metoprolol and lisinopril this AM as well as PRN hydralazine x1  BP today downtrending from 200s systolic to 160s  Goal BP per neuro- <130s/80s  Patient's home medications likely have not reached full therapeutic effect after restarting  Lisinopril increased to 20mg daily by primary team  C/w metoprolol tartrate 25mg BID and lisinopril 20mg daily  Will consider adding afterload-reducing agent if hypertension persists, due to elevated diastolic BPs  C/W PRN hydralazine to 25mg PO Q6H for systolic BP >160 - patient received 1 PRN hydralazine OVN  Longstanding persistent atrial fibrillation (HCC)  Previous on eliquis 5mg BID and metoprolol tartrate 25mg BID, however patient has been non-compliant  Per neuro, restarted Eliquis today  C/W metoprolol     CVA (cerebral vascular accident) (HCC)  Patient presenting with one-sided weakness, decreased sensation, dizziness, and blurry vision. Found to have multiple small infarcts on MRI. Non-compliant with Eliquis prior to arrival. Stroke likely 2/2 to thrombus iso afib.    Plan per neurology  Hyperlipidemia  C/w atorvastatin  Nicotine dependence with current use  Spoke with patient about smoking cessation and risk of future stroke with concurrent cigarette smoking        Subjective     Patient seen and examined at bedside. No acute events overnight. Patient currently  denies any complaint. He states that his L foot sensation has improved significantly. He states that his L arm strength has improved. He states that he continues to have pain in his left shoulder. He feels that his blurry vision has improved. Denies any chest pain, SOB, palpitations, orthopnea, PND, nausea, vomiting, fevers, or chills.   Objective     Vitals: Temp (24hrs), Av °F (36.7 °C), Min:97.7 °F (36.5 °C), Max:98.2 °F (36.8 °C)  Current: Temperature: 97.7 °F (36.5 °C)  Patient Vitals for the past 24 hrs:   BP Temp Pulse Resp SpO2   01/10/25 1211 152/96 -- 76 -- 95 %   01/10/25 0716 (!) 159/102 97.7 °F (36.5 °C) -- 16 --   01/10/25 0303 155/90 -- -- -- --   01/10/25 0301 157/100 -- 87 -- 98 %   01/10/25 0240 166/95 -- 84 -- 95 %   25 2359 167/98 -- 77 -- 97 %   25 2114 (!) 159/101 98.2 °F (36.8 °C) 83 -- 95 %   25 1619 144/97 -- 68 -- 97 %    Body mass index is 25.85 kg/m².  Physical Exam:  Physical Exam  Constitutional:       Appearance: Normal appearance.   HENT:      Head: Normocephalic.      Mouth/Throat:      Mouth: Mucous membranes are moist.   Eyes:      Extraocular Movements: Extraocular movements intact.      Pupils: Pupils are equal, round, and reactive to light.   Cardiovascular:      Rate and Rhythm: Normal rate. Rhythm irregular.      Pulses: Normal pulses.      Heart sounds: Normal heart sounds. No murmur heard.     No friction rub. No gallop.   Pulmonary:      Effort: Pulmonary effort is normal. No respiratory distress.      Breath sounds: Normal breath sounds. No wheezing, rhonchi or rales.   Abdominal:      General: Abdomen is flat. Bowel sounds are normal. There is no distension.      Palpations: Abdomen is soft.      Tenderness: There is no abdominal tenderness. There is no guarding.      Hernia: No hernia is present.   Musculoskeletal:         General: Signs of injury (Tenderness and decreased ROM L shoulder d/t pain) present.      Right lower leg: No edema.      Left  "lower leg: No edema.   Skin:     General: Skin is warm.      Capillary Refill: Capillary refill takes less than 2 seconds.   Neurological:      Mental Status: He is alert and oriented to person, place, and time.      Cranial Nerves: Cranial nerves 2-12 are intact.      Sensory: Sensory deficit (L foot decreased sensation) present.      Motor: Weakness (L hand  strength 4/5) present.      Comments: Blurry vision present. Patient endorses improvement         Invasive Devices       Peripheral Intravenous Line  Duration             Peripheral IV 01/08/25 Right;Ventral (anterior) Forearm 1 day                        Labs:   Results from last 7 days   Lab Units 01/09/25  0529 01/08/25  0458 01/07/25  1318   WBC Thousand/uL 8.53 10.57* 6.55   HEMOGLOBIN g/dL 16.5 16.1 17.2*   HEMATOCRIT % 46.4 44.8 49.1   PLATELETS Thousands/uL 195 185 193   SEGS PCT % 70 75 60   MONO PCT % 11 9 12   EOS PCT % 2 2 3      Results from last 7 days   Lab Units 01/09/25  0529 01/08/25  0458 01/07/25  1318   SODIUM mmol/L 137 136 137   POTASSIUM mmol/L 4.1 4.2 4.1   CHLORIDE mmol/L 102 99 98   CO2 mmol/L 26 26 29   BUN mg/dL 23 16 15   CREATININE mg/dL 1.09 0.88 1.02   CALCIUM mg/dL 8.5 8.8 9.2   ALK PHOS U/L  --  52 57   ALT U/L  --  17 20   AST U/L  --  25 23   MAGNESIUM mg/dL 1.9 1.9  --    PHOSPHORUS mg/dL 3.4  --   --    INR   --  1.01 0.93   PTT seconds  --  28 29   EGFR ml/min/1.73sq m 69 88 75     Results from last 7 days   Lab Units 01/08/25  0458 01/07/25  1318   INR  1.01 0.93   PTT seconds 28 29             No results found for: \"PHART\", \"TKX9VPH\", \"PO2ART\", \"DEO6VMK\", \"D8RAQTKK\", \"BEART\", \"SOURCE\"  No components found for: \"HIV1X2\"  No results found for: \"HAV\", \"HEPAIGM\", \"HEPBIGM\", \"HEPBCAB\", \"HBEAG\", \"HEPCAB\"  No results found for: \"SPEP\", \"UPEP\"   Lab Results   Component Value Date    HGBA1C 5.3 01/08/2025    HGBA1C 5.0 08/22/2020     No results found for: \"CHOL\"   Lab Results   Component Value Date    HDL 47 01/07/2025    " "HDL 54 08/22/2020      Lab Results   Component Value Date    LDLCALC 54 01/07/2025    LDLCALC 48 08/22/2020      Lab Results   Component Value Date    TRIG 74 01/07/2025    TRIG 92 08/22/2020     No components found for: \"PROCAL\"      Micro:      Urinalysis:  Lab Results   Component Value Date    ETOH <10 01/07/2025          Invalid input(s): \"URIBILINOGEN\"        Intake and Outputs:  I/O         01/08 0701 01/09 0700 01/09 0701  01/10 0700 01/10 0701  01/11 0700    P.O.  940 120    IV Piggyback  50     Total Intake(mL/kg)  990 (12.8) 120 (1.6)    Urine (mL/kg/hr) 400 (0.2) 775 (0.4)     Stool  0     Total Output 400 775     Net -400 +215 +120           Unmeasured Stool Occurrence  1 x           Nutrition:  Diet Cardiovascular; Sodium 2 GM  Radiology Results:   XR shoulder 2+ vw left   Final Result by Byron Menard MD (01/08 1345)      Acute nondisplaced fracture of the greater tuberosity of the humeral head      The study was marked in EPIC for immediate notification.         Computerized Assisted Algorithm (CAA) may have been used to analyze all applicable images.         Workstation performed: [id number]         MRI brain wo contrast   Final Result by Rocky James MD (01/08 1115)      1. Recent infarctions in the right thalamus, right occipital lobe, and right temporal lobe, correlating to some of the findings on yesterday's head CT. These discontiguous regions of acute/recent infarction are subtended by the right posterior cerebral    artery, correlating to the P2 segment of the right posterior cerebral artery occlusion seen on yesterday's CTA. There is no acute hemorrhagic conversion.   2. Mild chronic microangiopathic changes.   3. Stable appearance of old right cerebellar infarct.      Resident: Kel Dean I, the attending radiologist, have reviewed the images and agree with the final report above.      Workstation performed: [id number]         CTA head and neck with and " without contrast   ED Interpretation by Heber Gore MD (01/07 6740)   Encephalomalacia of the right cerebellum from previous hemorrhagic stroke in 2020.      Final Result by E. Alec Schoenberger, MD (01/07 3018)      CT Brain: New small infarct in the right occipital lobe that could be acute. No acute hemorrhage.      CT Angiography: Proximal occlusion of the P2 segment of the right PCA.   Stable 3 mm ACOM aneurysm. Moderate stenosis cervical left ICA progressed from prior study.   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      I personally discussed this study with RAMIN BLANTON on 1/7/2025 6:56 PM.            Workstation performed: [id number]           Scheduled Medications:  apixaban, 5 mg, Q12H  aspirin, 81 mg, Daily  atorvastatin, 40 mg, Daily With Dinner  folic acid, 1 mg, Daily  levothyroxine, 75 mcg, Early Morning  lidocaine, 1 patch, Daily  lisinopril, 20 mg, Daily  metoprolol tartrate, 25 mg, Q12H MALGORZATA  multivitamin-minerals, 1 tablet, Daily  nicotine, 1 patch, Daily  thiamine, 500 mg, Daily      PRN MEDS:  acetaminophen, 650 mg, Q6H PRN  hydrALAZINE, 25 mg, Q8H PRN      Last 24 Hour Meds: :   Medication Administration - last 24 hours from 01/09/2025 1447 to 01/10/2025 1447         Date/Time Order Dose Route Action Action by     01/10/2025 0905 EST acetaminophen (TYLENOL) tablet 650 mg 650 mg Oral Given Greg Carrera RN     01/10/2025 0857 EST aspirin chewable tablet 81 mg 81 mg Oral Given Greg Carrera RN     01/10/2025 0616 EST levothyroxine tablet 75 mcg 75 mcg Oral Given Mei Davis RN     01/10/2025 0856 EST nicotine (NICODERM CQ) 21 mg/24 hr TD 24 hr patch 1 patch 1 patch Transdermal Medication Applied Greg Carrera RN     01/10/2025 0810 EST nicotine (NICODERM CQ) 21 mg/24 hr TD 24 hr patch 1 patch 1 patch Transdermal Patch Removed Greg Carrera RN     01/09/2025 1619 EST atorvastatin (LIPITOR) tablet 40 mg 40 mg Oral Given Fermin Cortes  RN     01/10/2025 0857 EST metoprolol tartrate (LOPRESSOR) tablet 25 mg 25 mg Oral Given Greg Carrera, MARÍA     01/09/2025 2115 EST metoprolol tartrate (LOPRESSOR) tablet 25 mg 25 mg Oral Given Mei Davis, MARÍA     01/10/2025 0002 EST hydrALAZINE (APRESOLINE) tablet 25 mg 25 mg Oral Given Mei Davis, MARÍA     01/10/2025 0858 EST lidocaine (LIDODERM) 5 % patch 1 patch 1 patch Topical Medication Applied Greg Carrera RN     01/09/2025 2115 EST lidocaine (LIDODERM) 5 % patch 1 patch 1 patch Topical Patch Removed Mei Davis, MARÍA     01/10/2025 0857 EST folic acid (FOLVITE) tablet 1 mg 1 mg Oral Given Greg Carrera RN     01/10/2025 0857 EST multivitamin-minerals (CENTRUM) tablet 1 tablet 1 tablet Oral Given Greg Carrera RN     01/10/2025 0906 EST thiamine (VITAMIN B1) 500 mg in sodium chloride 0.9 % 50 mL IVPB 500 mg Intravenous New Bag Greg Carrera RN     01/10/2025 0856 EST lisinopril (ZESTRIL) tablet 20 mg 20 mg Oral Given Greg Carrera RN     01/10/2025 0616 EST heparin (porcine) subcutaneous injection 5,000 Units 5,000 Units Subcutaneous Given Mei Davis, MARÍA     01/09/2025 2115 EST heparin (porcine) subcutaneous injection 5,000 Units 5,000 Units Subcutaneous Given Mei Davis, MARÍA     01/10/2025 0856 EST apixaban (ELIQUIS) tablet 5 mg 5 mg Oral Given Greg Carrera RN            PLEASE NOTE:  This encounter was completed utilizing the [employer] Direct Speech Voice Recognition Software. Grammatical errors, random word insertions, pronoun errors and incomplete sentences are occasional consequences of the system due to software limitations, ambient noise and hardware issues.These may be missed by proof reading prior to affixing electronic signature. Any questions or concerns about the content, text or information contained within the body of this dictation should be directly addressed to the physician for clarification. Please do not hesitate to call me directly if  you have any any questions or concerns.      Chris Castellano PGY-1  Internal Medicine

## 2025-01-10 NOTE — ASSESSMENT & PLAN NOTE
Previous on eliquis 5mg BID and metoprolol tartrate 25mg BID, however patient has been non-compliant  Per neuro, restarted Eliquis today  C/W metoprolol

## 2025-01-10 NOTE — ASSESSMENT & PLAN NOTE
Per pt - had a hx of L TSA but had excellent recovery.  Patient reports falling in ED during this admission, resulting in new pain and decreased ROM  +leila Lang, ++Speeds, some pain/weakness on resisted ER>IR  As above

## 2025-01-10 NOTE — ASSESSMENT & PLAN NOTE
Family reported that patient drinks at least a case of beer a day.  Patient only endorses 5-6 beers a day. Was given high-dose thiamine  CIWA protocol in place - Has not required any Ativan.   Continue IV thiamine 500 mg daily then PO thiamine 100 mg daily  Continue folic acid 1 mg daily

## 2025-01-10 NOTE — RESPIRATORY THERAPY NOTE
Resp Note   01/09/25 6769   Respiratory Assessment   Resp Comments Pt attempted to go on CPAP +5 and was unable to elliot mask. Pt stated he doesnt wear one at home so he will not be wearing one while he is here. machine pulled. Will d/c order

## 2025-01-10 NOTE — PHYSICAL THERAPY NOTE
PHYSICAL THERAPY NOTE          Patient Name: Jose Ramon Quiñonez Jr.  Today's Date: 1/10/2025       01/10/25 1412   PT Last Visit   PT Visit Date 01/10/25   Note Type   Note Type Treatment   Pain Assessment   Pain Assessment Tool 0-10   Pain Score No Pain   Restrictions/Precautions   Weight Bearing Precautions Per Order Yes   LUE Weight Bearing Per Order (S)  NWB   Braces or Orthoses Sling  (LUE)   Other Precautions Cognitive;Chair Alarm;Bed Alarm;Multiple lines;Telemetry;Fall Risk;WBS;Visual impairment  (blurry vision (Worse than baseline))   General   Chart Reviewed Yes   Additional Pertinent History spoke with pt regarding therapy options after dc   Family/Caregiver Present No   Cognition   Overall Cognitive Status WFL   Arousal/Participation Cooperative   Attention Within functional limits   Orientation Level Oriented X4   Memory Within functional limits   Following Commands Follows all commands and directions without difficulty   Comments very pleasant to work with. improved cognition since IE yesterday   Subjective   Subjective agreeable   Bed Mobility   Supine to Sit 5  Supervision   Additional items HOB elevated;Increased time required   Sit to Supine 5  Supervision   Additional items Increased time required;Verbal cues;HOB elevated   Transfers   Sit to Stand 5  Supervision   Additional items Increased time required;Verbal cues   Stand to Sit 5  Supervision   Additional items Increased time required;Verbal cues   Additional Comments no AD   Ambulation/Elevation   Gait pattern Improper Weight shift;Decreased foot clearance;Short stride;Excessively slow   Gait Assistance 4  Minimal assist   Additional items Assist x 1;Verbal cues  (CGA)   Assistive Device None   Distance 120'x2   Stair Management Assistance 4  Minimal assist   Additional items Assist x 1;Verbal cues;Increased time required  (CGA)   Stair Management Technique One rail  R;Alternating pattern;Foreward;Reciprocal   Number of Stairs 7   Ambulation/Elevation Additional Comments (S)  no overt LOB. gait speed 0.78m/s, FGA score 22/30 (<22 is fall risk)   Balance   Static Sitting Fair +   Dynamic Sitting Fair   Static Standing Fair -   Dynamic Standing Poor +   Ambulatory Poor +   Endurance Deficit   Endurance Deficit Yes   Activity Tolerance   Activity Tolerance Patient tolerated treatment well   Medical Staff Made Aware OT for dc planning   Nurse Made Aware yes-cleared   Assessment   Prognosis Good   Problem List Decreased strength;Decreased endurance;Decreased range of motion;Impaired balance;Decreased mobility;Impaired vision   Assessment Pt agreeable to participate in PT session. Pt performed functional mobility and therex as outlined above. Progressing towards goals. More steady since IE yesterday however a few mild LOB with dynamic movements. FGA score right at fall risk cut off (22/30, <22 is fall risk). Main impairments will likely be related to ADLS as pt is NWB LUE s/p fx. Pt left supine in bed with bed alarm, call bell, phone, and all personal needs within reach. Pt will continue to benefit from skilled acute care PT to further address their functional mobility limitations.   Barriers to Discharge Decreased caregiver support;Inaccessible home environment   Goals   Patient Goals to improve   STG Expiration Date 01/23/25   PT Treatment Day 1   Plan   Treatment/Interventions Functional transfer training;LE strengthening/ROM;Therapeutic exercise;Endurance training;Elevations;Patient/family training;Equipment eval/education;Bed mobility;Gait training;Spoke to nursing;Spoke to case management;OT;Family   Progress Progressing toward goals   PT Frequency 3-5x/wk   Discharge Recommendation   Rehab Resource Intensity Level, PT I (Maximum Resource Intensity)  (vs level 3 pending ARC acceptance)   Equipment Recommended Cane   Additional Comments pt states he can most likely get help with  ADLS from cousin or stay with sister   AM-PAC Basic Mobility Inpatient   Turning in Flat Bed Without Bedrails 3   Lying on Back to Sitting on Edge of Flat Bed Without Bedrails 3   Moving Bed to Chair 3   Standing Up From Chair Using Arms 3   Walk in Room 3   Climb 3-5 Stairs With Railing 3   Basic Mobility Inpatient Raw Score 18   Basic Mobility Standardized Score 41.05   Rogerio Zamarripa Highest Level Of Mobility   JH-HLM Goal 6: Walk 10 steps or more   JH-HLM Achieved 7: Walk 25 feet or more   Clayton Doe, PT, DPT, NCS

## 2025-01-10 NOTE — PROGRESS NOTES
Met with patient at bedside in room 723. Introduced my role. Provided stroke education including the stroke education including the stroke education booklet and magnet. Reviewed stroke-like symptoms and stroke risk factors. Reviewed the importance of medication adherence. Patient is a current smoker of tobacco products. Reviewed smoking cessation, patient is determined to quit. Patient states how he will quit on his own.    Patient denies any questions or concerns at this time. Call bell placed within reach.

## 2025-01-10 NOTE — ASSESSMENT & PLAN NOTE
BP 230s/120s in ED 1/7 iso stroke; currently symptomatic w/ blurry vision, possibly due to stroke vs. HTN. No headache, CP, or SOB.  Home medications include metoprolol tartrate 25mg BID and lisinopril 10mg daily- patient endorses that he has been non-compliant with any of his medications for the past 5-6 months  S/p labetalol 10mg IV x3 in ED  BP remaining elevated despite restarting home metoprolol and lisinopril this AM as well as PRN hydralazine x1  BP today downtrending from 200s systolic to 160s  Goal BP per neuro- <130s/80s  Patient's home medications likely have not reached full therapeutic effect after restarting  Lisinopril increased to 20mg daily by primary team  C/w metoprolol tartrate 25mg BID and lisinopril 20mg daily  Will consider adding afterload-reducing agent if hypertension persists, due to elevated diastolic BPs  C/W PRN hydralazine to 25mg PO Q6H for systolic BP >160 - patient received 1 PRN hydralazine OVN

## 2025-01-10 NOTE — PLAN OF CARE
Problem: Potential for Falls  Goal: Patient will remain free of falls  Description: INTERVENTIONS:  - Educate patient/family on patient safety including physical limitations  - Instruct patient to call for assistance with activity   - Consult OT/PT to assist with strengthening/mobility   - Keep Call bell within reach  - Keep bed low and locked with side rails adjusted as appropriate  - Keep care items and personal belongings within reach  - Initiate and maintain comfort rounds  - Make Fall Risk Sign visible to staff  - Offer Toileting every 2 Hours, in advance of need  - Initiate/Maintain bed alarm  - Obtain necessary fall risk management equipment: non skid socks  - Apply yellow socks and bracelet for high fall risk patients  - Consider moving patient to room near nurses station  Outcome: Progressing     Problem: PAIN - ADULT  Goal: Verbalizes/displays adequate comfort level or baseline comfort level  Description: Interventions:  - Encourage patient to monitor pain and request assistance  - Assess pain using appropriate pain scale  - Administer analgesics based on type and severity of pain and evaluate response  - Implement non-pharmacological measures as appropriate and evaluate response  - Consider cultural and social influences on pain and pain management  - Notify physician/advanced practitioner if interventions unsuccessful or patient reports new pain  Outcome: Progressing     Problem: SAFETY ADULT  Goal: Patient will remain free of falls  Description: INTERVENTIONS:  - Educate patient/family on patient safety including physical limitations  - Instruct patient to call for assistance with activity   - Consult OT/PT to assist with strengthening/mobility   - Keep Call bell within reach  - Keep bed low and locked with side rails adjusted as appropriate  - Keep care items and personal belongings within reach  - Initiate and maintain comfort rounds  - Make Fall Risk Sign visible to staff  - Offer Toileting every 2  Hours, in advance of need  - Initiate/Maintain bed alarm  - Obtain necessary fall risk management equipment: non skid socks  - Apply yellow socks and bracelet for high fall risk patients  - Consider moving patient to room near nurses station  Outcome: Progressing     Problem: Neurological Deficit  Goal: Neurological status is stable or improving  Description: Interventions:  - Monitor and assess patient's level of consciousness, motor function, sensory function, and level of assistance needed for ADLs.   - Monitor and report changes from baseline. Collaborate with interdisciplinary team to initiate plan and implement interventions as ordered.   - Provide and maintain a safe environment.  - Consider seizure precautions.  - Consider fall precautions.  - Consider aspiration precautions.  - Consider bleeding precautions.  Outcome: Progressing     Problem: Activity Intolerance/Impaired Mobility  Goal: Mobility/activity is maintained at optimum level for patient  Description: Interventions:  - Assess and monitor patient  barriers to mobility and need for assistive/adaptive devices.  - Assess patient's emotional response to limitations.  - Collaborate with interdisciplinary team and initiate plans and interventions as ordered.  - Encourage independent activity per ability.  - Maintain proper body alignment.  - Perform active/passive rom as tolerated/ordered.  - Plan activities to conserve energy.  - Turn patient as appropriate  Outcome: Progressing     Problem: Potential for Aspiration  Goal: Non-ventilated patient's risk of aspiration is minimized  Description: Assess and monitor vital signs, respiratory status, and labs (WBC).  Monitor for signs of aspiration (tachypnea, cough, rales, wheezing, cyanosis, fever).    - Assess and monitor patient's ability to swallow.  - Place patient up in chair to eat if possible.  - HOB up at 90 degrees to eat if unable to get patient up into chair.  - Supervise patient during oral  intake.   - Instruct patient/ family to take small bites.  - Instruct patient/ family to take small single sips when taking liquids.  - Follow patient-specific strategies generated by speech pathologist.  Outcome: Progressing

## 2025-01-11 LAB
ANION GAP SERPL CALCULATED.3IONS-SCNC: 6 MMOL/L (ref 4–13)
APTT PPP: 31 SECONDS (ref 23–34)
BASOPHILS # BLD AUTO: 0.05 THOUSANDS/ΜL (ref 0–0.1)
BASOPHILS NFR BLD AUTO: 1 % (ref 0–1)
BUN SERPL-MCNC: 29 MG/DL (ref 5–25)
CALCIUM SERPL-MCNC: 9.1 MG/DL (ref 8.4–10.2)
CHLORIDE SERPL-SCNC: 102 MMOL/L (ref 96–108)
CO2 SERPL-SCNC: 32 MMOL/L (ref 21–32)
CREAT SERPL-MCNC: 1.4 MG/DL (ref 0.6–1.3)
EOSINOPHIL # BLD AUTO: 0.21 THOUSAND/ΜL (ref 0–0.61)
EOSINOPHIL NFR BLD AUTO: 3 % (ref 0–6)
ERYTHROCYTE [DISTWIDTH] IN BLOOD BY AUTOMATED COUNT: 12.8 % (ref 11.6–15.1)
GFR SERPL CREATININE-BSD FRML MDRD: 51 ML/MIN/1.73SQ M
GLUCOSE SERPL-MCNC: 112 MG/DL (ref 65–140)
HCT VFR BLD AUTO: 46.5 % (ref 36.5–49.3)
HGB BLD-MCNC: 15.8 G/DL (ref 12–17)
IMM GRANULOCYTES # BLD AUTO: 0.03 THOUSAND/UL (ref 0–0.2)
IMM GRANULOCYTES NFR BLD AUTO: 0 % (ref 0–2)
INR PPP: 1.1 (ref 0.85–1.19)
LYMPHOCYTES # BLD AUTO: 1.36 THOUSANDS/ΜL (ref 0.6–4.47)
LYMPHOCYTES NFR BLD AUTO: 19 % (ref 14–44)
MCH RBC QN AUTO: 34.3 PG (ref 26.8–34.3)
MCHC RBC AUTO-ENTMCNC: 34 G/DL (ref 31.4–37.4)
MCV RBC AUTO: 101 FL (ref 82–98)
MONOCYTES # BLD AUTO: 0.82 THOUSAND/ΜL (ref 0.17–1.22)
MONOCYTES NFR BLD AUTO: 11 % (ref 4–12)
NEUTROPHILS # BLD AUTO: 4.88 THOUSANDS/ΜL (ref 1.85–7.62)
NEUTS SEG NFR BLD AUTO: 66 % (ref 43–75)
NRBC BLD AUTO-RTO: 0 /100 WBCS
PLATELET # BLD AUTO: 203 THOUSANDS/UL (ref 149–390)
PMV BLD AUTO: 11 FL (ref 8.9–12.7)
POTASSIUM SERPL-SCNC: 3.5 MMOL/L (ref 3.5–5.3)
PROTHROMBIN TIME: 14.5 SECONDS (ref 12.3–15)
RBC # BLD AUTO: 4.61 MILLION/UL (ref 3.88–5.62)
SODIUM SERPL-SCNC: 140 MMOL/L (ref 135–147)
WBC # BLD AUTO: 7.35 THOUSAND/UL (ref 4.31–10.16)

## 2025-01-11 PROCEDURE — 85730 THROMBOPLASTIN TIME PARTIAL: CPT

## 2025-01-11 PROCEDURE — 85610 PROTHROMBIN TIME: CPT

## 2025-01-11 PROCEDURE — 99232 SBSQ HOSP IP/OBS MODERATE 35: CPT | Performed by: FAMILY MEDICINE

## 2025-01-11 PROCEDURE — 80048 BASIC METABOLIC PNL TOTAL CA: CPT

## 2025-01-11 PROCEDURE — 85025 COMPLETE CBC W/AUTO DIFF WBC: CPT

## 2025-01-11 RX ADMIN — FOLIC ACID 1 MG: 1 TABLET ORAL at 08:42

## 2025-01-11 RX ADMIN — Medication 1 TABLET: at 08:41

## 2025-01-11 RX ADMIN — THIAMINE HCL TAB 100 MG 100 MG: 100 TAB at 08:41

## 2025-01-11 RX ADMIN — ACETAMINOPHEN 650 MG: 325 TABLET, FILM COATED ORAL at 07:09

## 2025-01-11 RX ADMIN — METOPROLOL TARTRATE 25 MG: 25 TABLET, FILM COATED ORAL at 08:41

## 2025-01-11 RX ADMIN — ACETAMINOPHEN 650 MG: 325 TABLET, FILM COATED ORAL at 21:21

## 2025-01-11 RX ADMIN — APIXABAN 5 MG: 5 TABLET, FILM COATED ORAL at 08:42

## 2025-01-11 RX ADMIN — LISINOPRIL 20 MG: 20 TABLET ORAL at 08:41

## 2025-01-11 RX ADMIN — ATORVASTATIN CALCIUM 40 MG: 40 TABLET, FILM COATED ORAL at 16:01

## 2025-01-11 RX ADMIN — ASPIRIN 81 MG CHEWABLE TABLET 81 MG: 81 TABLET CHEWABLE at 08:42

## 2025-01-11 RX ADMIN — LEVOTHYROXINE SODIUM 75 MCG: 75 TABLET ORAL at 05:54

## 2025-01-11 RX ADMIN — AMLODIPINE BESYLATE 5 MG: 5 TABLET ORAL at 08:41

## 2025-01-11 RX ADMIN — APIXABAN 5 MG: 5 TABLET, FILM COATED ORAL at 21:22

## 2025-01-11 RX ADMIN — LIDOCAINE 1 PATCH: 50 PATCH TOPICAL at 08:42

## 2025-01-11 RX ADMIN — METOPROLOL TARTRATE 25 MG: 25 TABLET, FILM COATED ORAL at 21:21

## 2025-01-11 RX ADMIN — NICOTINE 1 PATCH: 21 PATCH, EXTENDED RELEASE TRANSDERMAL at 08:43

## 2025-01-11 RX ADMIN — THIAMINE HYDROCHLORIDE 500 MG: 100 INJECTION, SOLUTION INTRAMUSCULAR; INTRAVENOUS at 08:50

## 2025-01-11 NOTE — ASSESSMENT & PLAN NOTE
Presented with visual disturbance, LUE numbness/weakness, dysarthria, gait disturbance that started 4 days ago, & felt dizzy  Multiple stroke risk factors including afib not on AC, untreated HTN due to noncompliance with meds, prior history of embolic stroke R cerebellar  CTH:New small infarct in the right occipital lobe that could be acute. No acute hemorrhage.   CTA: Proximal occlusion of the P2 segment of the right PCA. Stable 3 mm ACOM aneurysm. Moderate stenosis cervical left ICA progressed from prior study. 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   Neurology following  MRI brain results reviewed-recent infractions in the right thalamus, and right occipital lobe, right temporal lobe.  This is discontinued because regions of acute/recent infractions are subtended by the right posterior cerebral artery correlating to the P2 segment of the right posterior cerebral artery occlusion seen on yesterday's CTA.  No acute hemorrhagic conversion  Echo reviewed - ejection fraction 65%.  Systolic function is normal.  ASA, statin  Neuro checks, stroke pathway  Goal normotension SBP<130/80  Started on Eliquis.  Outpatient follow-up with neurology

## 2025-01-11 NOTE — PLAN OF CARE
Problem: Potential for Falls  Goal: Patient will remain free of falls  Description: INTERVENTIONS:  - Educate patient/family on patient safety including physical limitations  - Instruct patient to call for assistance with activity   - Consult OT/PT to assist with strengthening/mobility   - Keep Call bell within reach  - Keep bed low and locked with side rails adjusted as appropriate  - Keep care items and personal belongings within reach  - Initiate and maintain comfort rounds  - Make Fall Risk Sign visible to staff  - Offer Toileting every 2 Hours, in advance of need  - Initiate/Maintain bed alarm  - Obtain necessary fall risk management equipment: non skid socks  - Apply yellow socks and bracelet for high fall risk patients  - Consider moving patient to room near nurses station  Outcome: Progressing     Problem: PAIN - ADULT  Goal: Verbalizes/displays adequate comfort level or baseline comfort level  Description: Interventions:  - Encourage patient to monitor pain and request assistance  - Assess pain using appropriate pain scale  - Administer analgesics based on type and severity of pain and evaluate response  - Implement non-pharmacological measures as appropriate and evaluate response  - Consider cultural and social influences on pain and pain management  - Notify physician/advanced practitioner if interventions unsuccessful or patient reports new pain  Outcome: Progressing     Problem: INFECTION - ADULT  Goal: Absence or prevention of progression during hospitalization  Description: INTERVENTIONS:  - Assess and monitor for signs and symptoms of infection  - Monitor lab/diagnostic results  - Monitor all insertion sites, i.e. indwelling lines, tubes, and drains  - Monitor endotracheal if appropriate and nasal secretions for changes in amount and color  - [place] appropriate cooling/warming therapies per order  - Administer medications as ordered  - Instruct and encourage patient and family to use good hand  hygiene technique  - Identify and instruct in appropriate isolation precautions for identified infection/condition  Outcome: Progressing  Goal: Absence of fever/infection during neutropenic period  Description: INTERVENTIONS:  - Monitor WBC    Outcome: Progressing     Problem: SAFETY ADULT  Goal: Patient will remain free of falls  Description: INTERVENTIONS:  - Educate patient/family on patient safety including physical limitations  - Instruct patient to call for assistance with activity   - Consult OT/PT to assist with strengthening/mobility   - Keep Call bell within reach  - Keep bed low and locked with side rails adjusted as appropriate  - Keep care items and personal belongings within reach  - Initiate and maintain comfort rounds  - Make Fall Risk Sign visible to staff  - Offer Toileting every 2 Hours, in advance of need  - Initiate/Maintain bed alarm  - Obtain necessary fall risk management equipment: non skid socks  - Apply yellow socks and bracelet for high fall risk patients  - Consider moving patient to room near nurses station  Outcome: Progressing  Goal: Maintain or return to baseline ADL function  Description: INTERVENTIONS:  -  Assess patient's ability to carry out ADLs; assess patient's baseline for ADL function and identify physical deficits which impact ability to perform ADLs (bathing, care of mouth/teeth, toileting, grooming, dressing, etc.)  - Assess/evaluate cause of self-care deficits   - Assess range of motion  - Assess patient's mobility; develop plan if impaired  - Assess patient's need for assistive devices and provide as appropriate  - Encourage maximum independence but intervene and supervise when necessary  - Involve family in performance of ADLs  - Assess for home care needs following discharge   - Consider OT consult to assist with ADL evaluation and planning for discharge  - Provide patient education as appropriate  Outcome: Progressing  Goal: Maintains/Returns to pre admission  functional level  Description: INTERVENTIONS:  - Perform AM-PAC 6 Click Basic Mobility/ Daily Activity assessment daily.  - Set and communicate daily mobility goal to care team and patient/family/caregiver.   - Collaborate with rehabilitation services on mobility goals if consulted  - Perform Range of Motion 3 times a day.  - Reposition patient every 2 hours.  - Dangle patient 3 times a day  - Stand patient 3 times a day  - Ambulate patient 3 times a day  - Out of bed to chair 3 times a day   - Out of bed for meals 3 times a day  - Out of bed for toileting  - Record patient progress and toleration of activity level   Outcome: Progressing     Problem: Knowledge Deficit  Goal: Patient/family/caregiver demonstrates understanding of disease process, treatment plan, medications, and discharge instructions  Description: Complete learning assessment and assess knowledge base.  Interventions:  - Provide teaching at level of understanding  - Provide teaching via preferred learning methods  Outcome: Progressing     Problem: Neurological Deficit  Goal: Neurological status is stable or improving  Description: Interventions:  - Monitor and assess patient's level of consciousness, motor function, sensory function, and level of assistance needed for ADLs.   - Monitor and report changes from baseline. Collaborate with interdisciplinary team to initiate plan and implement interventions as ordered.   - Provide and maintain a safe environment.  - Consider seizure precautions.  - Consider fall precautions.  - Consider aspiration precautions.  - Consider bleeding precautions.  Outcome: Progressing     Problem: Activity Intolerance/Impaired Mobility  Goal: Mobility/activity is maintained at optimum level for patient  Description: Interventions:  - Assess and monitor patient  barriers to mobility and need for assistive/adaptive devices.  - Assess patient's emotional response to limitations.  - Collaborate with interdisciplinary team and  initiate plans and interventions as ordered.  - Encourage independent activity per ability.  - Maintain proper body alignment.  - Perform active/passive rom as tolerated/ordered.  - Plan activities to conserve energy.  - Turn patient as appropriate  Outcome: Progressing     Problem: Potential for Aspiration  Goal: Non-ventilated patient's risk of aspiration is minimized  Description: Assess and monitor vital signs, respiratory status, and labs (WBC).  Monitor for signs of aspiration (tachypnea, cough, rales, wheezing, cyanosis, fever).    - Assess and monitor patient's ability to swallow.  - Place patient up in chair to eat if possible.  - HOB up at 90 degrees to eat if unable to get patient up into chair.  - Supervise patient during oral intake.   - Instruct patient/ family to take small bites.  - Instruct patient/ family to take small single sips when taking liquids.  - Follow patient-specific strategies generated by speech pathologist.  Outcome: Progressing     Problem: Nutrition  Goal: Nutrition/Hydration status is improving  Description: Monitor and assess patient's nutrition/hydration status for malnutrition (ex- brittle hair, bruises, dry skin, pale skin and conjunctiva, muscle wasting, smooth red tongue, and disorientation). Collaborate with interdisciplinary team and initiate plan and interventions as ordered.  Monitor patient's weight and dietary intake as ordered or per policy. Utilize nutrition screening tool and intervene per policy. Determine patient's food preferences and provide high-protein, high-caloric foods as appropriate.     - Assist patient with eating.  - Allow adequate time for meals.  - Encourage patient to take dietary supplement as ordered.  - Collaborate with clinical nutritionist.  - Include patient/family/caregiver in decisions related to nutrition.  Outcome: Progressing

## 2025-01-11 NOTE — ASSESSMENT & PLAN NOTE
Pt reports nicotine dependence   0.5 packs/day every day cigarette smoker  Nicotine patch ordered  Promote smoke cessation   check PTH  there are some opacities in lungs, will r/o malig too

## 2025-01-11 NOTE — ASSESSMENT & PLAN NOTE
Patient with hypertensive emergency at the time of presentation.  Restarted back on metoprolol and lisinopril.  Continue lisinopril 20 mg due to persistent hypertension.    Monitor blood pressure  Added amlodipine.  Blood pressure appears to be better controlled.

## 2025-01-11 NOTE — CASE MANAGEMENT
Case Management Discharge Planning Note    Patient name Jose Ramon Quiñonez Jr.  Location [facility] 723/[facility]-01 MRN [id number]  : 1955 Date 2025       Current Admission Date: 2025  Current Admission Diagnosis:CVA (cerebral vascular accident) (HCC)   Patient Active Problem List    Diagnosis Date Noted Date Diagnosed    RAUDEL (obstructive sleep apnea) 2025     Unwitnessed fall 2025     Hyperlipidemia 2025     Leukocytosis 2025     Status post total shoulder arthroplasty, left 2025     Personal history of noncompliance with medical treatment and regimen 2025     Nicotine dependence with current use 2025     Alcohol dependence (HCC) 2025     Psychosocial stressors 2025     Closed nondisplaced fracture of greater tuberosity of left humerus 2025     Hypothyroid 2020     Aneurysm (HCC) 2020     Longstanding persistent atrial fibrillation (HCC) 2020     HTN (hypertension) 2020     CVA (cerebral vascular accident) (HCC) 2020       LOS (days): 4  Geometric Mean LOS (GMLOS) (days): 2.8  Days to GMLOS:-0.9     OBJECTIVE:  Risk of Unplanned Readmission Score: 12.26         Current admission status: Inpatient   Preferred Pharmacy:   [id number] - [place] - [address]  [address]  [place] [id number]  Phone: [phone] Fax: [phone]    [facility] [place] - [address]  [address]  [place] [address]  Phone: [phone] Fax: [phone]    [id number] - [place] - [address]  [address]   [place] [address]  Phone: [phone] Fax: [phone]    Primary Care Provider: Margarito Grant MD    Primary Insurance: MEDICARE  Secondary Insurance:     DISCHARGE DETAILS:    Other Referral/Resources/Interventions Provided:  Referral Comments: Per [facility] liason, no available beds at [facility] over the weekend, CM to follow up on Monday

## 2025-01-11 NOTE — PLAN OF CARE
Problem: Potential for Falls  Goal: Patient will remain free of falls  Description: INTERVENTIONS:  - Educate patient/family on patient safety including physical limitations  - Instruct patient to call for assistance with activity   - Consult OT/PT to assist with strengthening/mobility   - Keep Call bell within reach  - Keep bed low and locked with side rails adjusted as appropriate  - Keep care items and personal belongings within reach  - Initiate and maintain comfort rounds  - Make Fall Risk Sign visible to staff  - Offer Toileting every 2 Hours, in advance of need  - Initiate/Maintain bed alarm  - Obtain necessary fall risk management equipment: non skid socks  - Apply yellow socks and bracelet for high fall risk patients  - Consider moving patient to room near nurses station  Outcome: Progressing     Problem: PAIN - ADULT  Goal: Verbalizes/displays adequate comfort level or baseline comfort level  Description: Interventions:  - Encourage patient to monitor pain and request assistance  - Assess pain using appropriate pain scale  - Administer analgesics based on type and severity of pain and evaluate response  - Implement non-pharmacological measures as appropriate and evaluate response  - Consider cultural and social influences on pain and pain management  - Notify physician/advanced practitioner if interventions unsuccessful or patient reports new pain  Outcome: Progressing     Problem: INFECTION - ADULT  Goal: Absence or prevention of progression during hospitalization  Description: INTERVENTIONS:  - Assess and monitor for signs and symptoms of infection  - Monitor lab/diagnostic results  - Monitor all insertion sites, i.e. indwelling lines, tubes, and drains  - Monitor endotracheal if appropriate and nasal secretions for changes in amount and color  - [place] appropriate cooling/warming therapies per order  - Administer medications as ordered  - Instruct and encourage patient and family to use good hand  hygiene technique  - Identify and instruct in appropriate isolation precautions for identified infection/condition  Outcome: Progressing     Problem: SAFETY ADULT  Goal: Patient will remain free of falls  Description: INTERVENTIONS:  - Educate patient/family on patient safety including physical limitations  - Instruct patient to call for assistance with activity   - Consult OT/PT to assist with strengthening/mobility   - Keep Call bell within reach  - Keep bed low and locked with side rails adjusted as appropriate  - Keep care items and personal belongings within reach  - Initiate and maintain comfort rounds  - Make Fall Risk Sign visible to staff  - Offer Toileting every 2 Hours, in advance of need  - Initiate/Maintain bed alarm  - Obtain necessary fall risk management equipment: non skid socks  - Apply yellow socks and bracelet for high fall risk patients  - Consider moving patient to room near nurses station  Outcome: Progressing     Problem: Knowledge Deficit  Goal: Patient/family/caregiver demonstrates understanding of disease process, treatment plan, medications, and discharge instructions  Description: Complete learning assessment and assess knowledge base.  Interventions:  - Provide teaching at level of understanding  - Provide teaching via preferred learning methods  Outcome: Progressing     Problem: Neurological Deficit  Goal: Neurological status is stable or improving  Description: Interventions:  - Monitor and assess patient's level of consciousness, motor function, sensory function, and level of assistance needed for ADLs.   - Monitor and report changes from baseline. Collaborate with interdisciplinary team to initiate plan and implement interventions as ordered.   - Provide and maintain a safe environment.  - Consider seizure precautions.  - Consider fall precautions.  - Consider aspiration precautions.  - Consider bleeding precautions.  Outcome: Progressing

## 2025-01-11 NOTE — CASE MANAGEMENT
Case Management Discharge Planning Note    Patient name Jose Ramon Quiñonez Jr.  Location [facility] 723/[facility] 723-01 MRN [id number]  : 1955 Date 2025       Current Admission Date: 2025  Current Admission Diagnosis:CVA (cerebral vascular accident) (HCC)   Patient Active Problem List    Diagnosis Date Noted Date Diagnosed    RAUDEL (obstructive sleep apnea) 2025     Unwitnessed fall 2025     Hyperlipidemia 2025     Leukocytosis 2025     Status post total shoulder arthroplasty, left 2025     Personal history of noncompliance with medical treatment and regimen 2025     Nicotine dependence with current use 2025     Alcohol dependence (HCC) 2025     Psychosocial stressors 2025     Closed nondisplaced fracture of greater tuberosity of left humerus 2025     Hypothyroid 2020     Aneurysm (HCC) 2020     Longstanding persistent atrial fibrillation (HCC) 2020     HTN (hypertension) 2020     CVA (cerebral vascular accident) (HCC) 2020       LOS (days): 4  Geometric Mean LOS (GMLOS) (days): 2.8  Days to GMLOS:-0.8     OBJECTIVE:  Risk of Unplanned Readmission Score: 12.23         Current admission status: Inpatient   Preferred Pharmacy:   [id number] - [place] - [address]  [address]  [place] [address]  Phone: [phone] Fax: [phone]    [facility] [place] - [place] - [address]  [address]  [place] [address]  Phone: [phone] Fax: [phone]    [id number] - [place] - [address]  [address]   [place] [address]  Phone: [phone] Fax: [phone]    Primary Care Provider: Margarito Grant MD    Primary Insurance: MEDICARE  Secondary Insurance:     DISCHARGE DETAILS:    Other Referral/Resources/Interventions Provided:  Referral Comments: Message sent to [facility] liason via [employer] that patient is medically stable for STR, noted that  preference is [facility] location, awaiting response as to whether [facility] can accept

## 2025-01-11 NOTE — ASSESSMENT & PLAN NOTE
Per pt - had a hx of L TSA but had excellent recovery.  Patient reports falling in ED during this admission, resulting in new pain and decreased ROM  Orthopedic evaluation appreciated

## 2025-01-11 NOTE — PROGRESS NOTES
Progress Note - Hospitalist   Name: Jose Ramon Quiñonez Jr. 69 y.o. male I MRN: [id number]  Unit/Bed#: [facility] 723-01 I Date of Admission: 1/7/2025   Date of Service: 1/11/2025 I Hospital Day: 4     Assessment & Plan  CVA (cerebral vascular accident) (HCC)  Presented with visual disturbance, LUE numbness/weakness, dysarthria, gait disturbance that started 4 days ago, & felt dizzy  Multiple stroke risk factors including afib not on AC, untreated HTN due to noncompliance with meds, prior history of embolic stroke R cerebellar  CTH:New small infarct in the right occipital lobe that could be acute. No acute hemorrhage.   CTA: Proximal occlusion of the P2 segment of the right PCA. Stable 3 mm ACOM aneurysm. Moderate stenosis cervical left ICA progressed from prior study. 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   Neurology following  MRI brain results reviewed-recent infractions in the right thalamus, and right occipital lobe, right temporal lobe.  This is discontinued because regions of acute/recent infractions are subtended by the right posterior cerebral artery correlating to the P2 segment of the right posterior cerebral artery occlusion seen on yesterday's CTA.  No acute hemorrhagic conversion  Echo reviewed - ejection fraction 65%.  Systolic function is normal.  ASA, statin  Neuro checks, stroke pathway  Goal normotension SBP<130/80  Started on Eliquis.  Outpatient follow-up with neurology  Longstanding persistent atrial fibrillation (HCC)  Rate controlled  Continue metoprolol  Patient was not taking any medication for the past 6 months or so. Has not seen a PCP since 2022  Restart Eliquis 1/10/25  HTN (hypertension)  Patient with hypertensive emergency at the time of presentation.  Restarted back on metoprolol and lisinopril.  Continue lisinopril 20 mg due to persistent hypertension.    Monitor blood pressure  Added amlodipine.  Blood pressure appears to be better  "controlled.  Hypothyroid  Continue levothyroxine-repeat thyroid studies in 4 to 6 weeks as outpatient  RAUDEL (obstructive sleep apnea)  CPAP HS  Aneurysm (HCC)  CTA head & neck w/ & w/o contrast: \"CT Brain: New small infarct in the right occipital lobe that could be acute. No acute hemorrhage. CT Angiography: Proximal occlusion of the P2 segment of the right PCA. Stable 3 mm ACOM aneurysm. Moderate stenosis cervical left ICA progressed from prior study. 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  Unwitnessed fall  Patient had an unwitnessed fall in the emergency room. Patient with left shoulder pain.  X-ray reviewed. Noted to have acute nondisplaced fracture of the greater tuberosity of the humeral head  Ortho recommendations   Nonweightbearing left upper extremity.   Okay for range of motion and activity to tolerance.   Plan for conservative management with sling and repeat x-ray in 2 weeks   Plan for outpatient follow-up with orthopedic  Hyperlipidemia  Lab Results   Component Value Date    CHOLESTEROL 116 01/07/2025    CHOLESTEROL 120 08/22/2020    TRIG 74 01/07/2025    TRIG 92 08/22/2020    HDL 47 01/07/2025    HDL 54 08/22/2020    LDLCALC 54 01/07/2025    LDLCALC 48 08/22/2020   Continue to monitor  Leukocytosis  Resolved  Personal history of noncompliance with medical treatment and regimen  Pt reports noncompliance with medications for the last several months.  Pt has not seen PCP for medication refills  Plan to set up PCP   Nicotine dependence with current use  Pt reports nicotine dependence   0.5 packs/day every day cigarette smoker  Nicotine patch ordered  Promote smoke cessation  Alcohol dependence (HCC)  Family reported that patient drinks at least a case of beer a day.  Patient only endorses 5-6 beers a day. Was given high-dose thiamine  CIWA protocol in place - Has not required any " Ativan.   Continue IV thiamine 500 mg daily then PO thiamine 100 mg daily  Continue folic acid 1 mg daily  Psychosocial stressors  Pt reports psychosocial stressor of recent divorce.  Pt at risk for depression  Recommend cognitive therapy   At risk for increased confusion/delirium, restlessness, agitation, and fall - continue to monitor   Monitor neuro-exam, wakefulness, mood, cognition, insight into deficits and safety awareness   Overstimulation precautions, frequent re-orientation, re-direction, re-assurance  Optimal mood, pain, and sleep management  Plan for possible ARC/IRF disposition  Closed nondisplaced fracture of greater tuberosity of left humerus  As above  Status post total shoulder arthroplasty, left  Per pt - had a hx of L TSA but had excellent recovery.  Patient reports falling in ED during this admission, resulting in new pain and decreased ROM  Orthopedic evaluation appreciated    VTE Pharmacologic Prophylaxis: VTE Score: 8     Mobility:   Basic Mobility Inpatient Raw Score: 18  JH-HLM Goal: 6: Walk 10 steps or more  JH-HLM Achieved: 7: Walk 25 feet or more  Seconds    Patient Centered Rounds:    Discussions with Specialists or Other Care Team Provider:     Education and Discussions with Family / Patient: Updated  (daughter) via phone.    Current Length of Stay: 4 day(s)  Current Patient Status: Inpatient   Certification Statement: The patient will continue to require additional inpatient hospital stay due to pending rehab  Discharge Plan: Anticipate discharge in 24-48 hrs to rehab facility.    Code Status: Level 1 - Full Code    Subjective   Patient seen and examined.  No events overnight.  No signs of withdrawal.  Blood pressure appears to be better controlled.  Plan is for discharge to rehab    Objective :  Temp:  [97.9 °F (36.6 °C)-98.8 °F (37.1 °C)] 97.9 °F (36.6 °C)  HR:  [69-82] 79  BP: (142-153)/() 142/95  Resp:  [16-18] 18  SpO2:  [97 %-99 %] 99 %  O2 Device: None (Room  air)    Body mass index is 25.85 kg/m².     Input and Output Summary (last 24 hours):     Intake/Output Summary (Last 24 hours) at 1/11/2025 1829  Last data filed at 1/11/2025 1301  Gross per 24 hour   Intake 120 ml   Output 600 ml   Net -480 ml       Physical Exam  Constitutional:       General: He is not in acute distress.     Appearance: He is not ill-appearing.   HENT:      Head: Normocephalic and atraumatic.      Nose: Nose normal.   Eyes:      General: No scleral icterus.  Cardiovascular:      Rate and Rhythm: Normal rate and regular rhythm.      Heart sounds: No murmur heard.  Pulmonary:      Effort: Pulmonary effort is normal. No respiratory distress.   Abdominal:      General: Bowel sounds are normal.   Musculoskeletal:         General: Normal range of motion.   Skin:     General: Skin is warm.   Neurological:      Mental Status: He is alert and oriented to person, place, and time.      Comments:  Left upper extremity weakness          Lines/Drains:              Lab Results: I have reviewed the following results:   Results from last 7 days   Lab Units 01/11/25  0537   WBC Thousand/uL 7.35   HEMOGLOBIN g/dL 15.8   HEMATOCRIT % 46.5   PLATELETS Thousands/uL 203   SEGS PCT % 66   LYMPHO PCT % 19   MONO PCT % 11   EOS PCT % 3     Results from last 7 days   Lab Units 01/11/25  0537 01/09/25  0529 01/08/25  0458   SODIUM mmol/L 140   < > 136   POTASSIUM mmol/L 3.5   < > 4.2   CHLORIDE mmol/L 102   < > 99   CO2 mmol/L 32   < > 26   BUN mg/dL 29*   < > 16   CREATININE mg/dL 1.40*   < > 0.88   ANION GAP mmol/L 6   < > 11   CALCIUM mg/dL 9.1   < > 8.8   ALBUMIN g/dL  --   --  3.9   TOTAL BILIRUBIN mg/dL  --   --  1.24*   ALK PHOS U/L  --   --  52   ALT U/L  --   --  17   AST U/L  --   --  25   GLUCOSE RANDOM mg/dL 112   < > 98    < > = values in this interval not displayed.     Results from last 7 days   Lab Units 01/11/25  0537   INR  1.10     Results from last 7 days   Lab Units 01/07/25  1305   POC GLUCOSE mg/dl  94     Results from last 7 days   Lab Units 01/08/25  0458   HEMOGLOBIN A1C % 5.3     Results from last 7 days   Lab Units 01/09/25  0529   PROCALCITONIN ng/ml <0.05       Recent Cultures (last 7 days):               Last 24 Hours Medication List:     Current Facility-Administered Medications:     acetaminophen (TYLENOL) tablet 650 mg, Q6H PRN    amLODIPine (NORVASC) tablet 5 mg, Daily    apixaban (ELIQUIS) tablet 5 mg, Q12H    aspirin chewable tablet 81 mg, Daily    atorvastatin (LIPITOR) tablet 40 mg, Daily With Dinner    folic acid (FOLVITE) tablet 1 mg, Daily    hydrALAZINE (APRESOLINE) tablet 25 mg, Q8H PRN    levothyroxine tablet 75 mcg, Early Morning    lidocaine (LIDODERM) 5 % patch 1 patch, Daily    lisinopril (ZESTRIL) tablet 20 mg, Daily    metoprolol tartrate (LOPRESSOR) tablet 25 mg, Q12H MALGORZATA    multivitamin-minerals (CENTRUM) tablet 1 tablet, Daily    nicotine (NICODERM CQ) 21 mg/24 hr TD 24 hr patch 1 patch, Daily    thiamine tablet 100 mg, Daily    Administrative Statements   Today, Patient Was Seen By: Leilani Gutierrez MD      **Please Note: This note may have been constructed using a voice recognition system.**

## 2025-01-12 LAB
ANION GAP SERPL CALCULATED.3IONS-SCNC: 8 MMOL/L (ref 4–13)
BUN SERPL-MCNC: 25 MG/DL (ref 5–25)
CALCIUM SERPL-MCNC: 8.7 MG/DL (ref 8.4–10.2)
CHLORIDE SERPL-SCNC: 102 MMOL/L (ref 96–108)
CO2 SERPL-SCNC: 26 MMOL/L (ref 21–32)
CREAT SERPL-MCNC: 1.13 MG/DL (ref 0.6–1.3)
GFR SERPL CREATININE-BSD FRML MDRD: 66 ML/MIN/1.73SQ M
GLUCOSE SERPL-MCNC: 117 MG/DL (ref 65–140)
POTASSIUM SERPL-SCNC: 3.7 MMOL/L (ref 3.5–5.3)
SODIUM SERPL-SCNC: 136 MMOL/L (ref 135–147)

## 2025-01-12 PROCEDURE — 97110 THERAPEUTIC EXERCISES: CPT

## 2025-01-12 PROCEDURE — 80048 BASIC METABOLIC PNL TOTAL CA: CPT | Performed by: FAMILY MEDICINE

## 2025-01-12 PROCEDURE — 97535 SELF CARE MNGMENT TRAINING: CPT

## 2025-01-12 PROCEDURE — 99232 SBSQ HOSP IP/OBS MODERATE 35: CPT | Performed by: FAMILY MEDICINE

## 2025-01-12 PROCEDURE — 97530 THERAPEUTIC ACTIVITIES: CPT

## 2025-01-12 RX ORDER — AMLODIPINE BESYLATE 2.5 MG/1
2.5 TABLET ORAL 2 TIMES DAILY
Status: DISCONTINUED | OUTPATIENT
Start: 2025-01-13 | End: 2025-01-14 | Stop reason: HOSPADM

## 2025-01-12 RX ORDER — CALCIUM CARBONATE 500 MG/1
500 TABLET, CHEWABLE ORAL ONCE
Status: COMPLETED | OUTPATIENT
Start: 2025-01-12 | End: 2025-01-12

## 2025-01-12 RX ORDER — AMLODIPINE BESYLATE 5 MG/1
5 TABLET ORAL 2 TIMES DAILY
Status: DISCONTINUED | OUTPATIENT
Start: 2025-01-12 | End: 2025-01-12

## 2025-01-12 RX ADMIN — APIXABAN 5 MG: 5 TABLET, FILM COATED ORAL at 21:08

## 2025-01-12 RX ADMIN — ACETAMINOPHEN 650 MG: 325 TABLET, FILM COATED ORAL at 19:43

## 2025-01-12 RX ADMIN — ATORVASTATIN CALCIUM 40 MG: 40 TABLET, FILM COATED ORAL at 17:16

## 2025-01-12 RX ADMIN — FOLIC ACID 1 MG: 1 TABLET ORAL at 08:46

## 2025-01-12 RX ADMIN — ANTACID TABLETS 500 MG: 500 TABLET, CHEWABLE ORAL at 21:41

## 2025-01-12 RX ADMIN — ASPIRIN 81 MG CHEWABLE TABLET 81 MG: 81 TABLET CHEWABLE at 08:46

## 2025-01-12 RX ADMIN — APIXABAN 5 MG: 5 TABLET, FILM COATED ORAL at 08:47

## 2025-01-12 RX ADMIN — METOPROLOL TARTRATE 25 MG: 25 TABLET, FILM COATED ORAL at 08:46

## 2025-01-12 RX ADMIN — METOPROLOL TARTRATE 25 MG: 25 TABLET, FILM COATED ORAL at 21:08

## 2025-01-12 RX ADMIN — NICOTINE 1 PATCH: 21 PATCH, EXTENDED RELEASE TRANSDERMAL at 08:48

## 2025-01-12 RX ADMIN — LIDOCAINE 1 PATCH: 50 PATCH TOPICAL at 08:47

## 2025-01-12 RX ADMIN — LEVOTHYROXINE SODIUM 75 MCG: 75 TABLET ORAL at 05:12

## 2025-01-12 RX ADMIN — LISINOPRIL 20 MG: 20 TABLET ORAL at 11:52

## 2025-01-12 RX ADMIN — THIAMINE HCL TAB 100 MG 100 MG: 100 TAB at 08:46

## 2025-01-12 RX ADMIN — ACETAMINOPHEN 650 MG: 325 TABLET, FILM COATED ORAL at 05:12

## 2025-01-12 RX ADMIN — Medication 1 TABLET: at 08:46

## 2025-01-12 RX ADMIN — AMLODIPINE BESYLATE 5 MG: 5 TABLET ORAL at 08:47

## 2025-01-12 NOTE — ASSESSMENT & PLAN NOTE
Patient with hypertensive emergency at the time of presentation.  Restarted back on metoprolol and lisinopril.    Monitor blood pressure  Added amlodipine. -Will split the amlodipine dose due to elevated blood pressure in the morning  List was held yesterday due to worsening creatinine.  Restarted since the creatinine  is down

## 2025-01-12 NOTE — PLAN OF CARE
Problem: Potential for Falls  Goal: Patient will remain free of falls  Description: INTERVENTIONS:  - Educate patient/family on patient safety including physical limitations  - Instruct patient to call for assistance with activity   - Consult OT/PT to assist with strengthening/mobility   - Keep Call bell within reach  - Keep bed low and locked with side rails adjusted as appropriate  - Keep care items and personal belongings within reach  - Initiate and maintain comfort rounds  - Make Fall Risk Sign visible to staff  - Offer Toileting every 2 Hours, in advance of need  - Initiate/Maintain bed alarm  - Obtain necessary fall risk management equipment: non skid socks  - Apply yellow socks and bracelet for high fall risk patients  - Consider moving patient to room near nurses station  Outcome: Progressing     Problem: INFECTION - ADULT  Goal: Absence or prevention of progression during hospitalization  Description: INTERVENTIONS:  - Assess and monitor for signs and symptoms of infection  - Monitor lab/diagnostic results  - Monitor all insertion sites, i.e. indwelling lines, tubes, and drains  - Monitor endotracheal if appropriate and nasal secretions for changes in amount and color  - [place] appropriate cooling/warming therapies per order  - Administer medications as ordered  - Instruct and encourage patient and family to use good hand hygiene technique  - Identify and instruct in appropriate isolation precautions for identified infection/condition  Outcome: Progressing     Problem: SAFETY ADULT  Goal: Patient will remain free of falls  Description: INTERVENTIONS:  - Educate patient/family on patient safety including physical limitations  - Instruct patient to call for assistance with activity   - Consult OT/PT to assist with strengthening/mobility   - Keep Call bell within reach  - Keep bed low and locked with side rails adjusted as appropriate  - Keep care items and personal belongings within reach  - Initiate  and maintain comfort rounds  - Make Fall Risk Sign visible to staff  - Offer Toileting every 2 Hours, in advance of need  - Initiate/Maintain bed alarm  - Obtain necessary fall risk management equipment: non skid socks  - Apply yellow socks and bracelet for high fall risk patients  - Consider moving patient to room near nurses station  Outcome: Progressing     Problem: Knowledge Deficit  Goal: Patient/family/caregiver demonstrates understanding of disease process, treatment plan, medications, and discharge instructions  Description: Complete learning assessment and assess knowledge base.  Interventions:  - Provide teaching at level of understanding  - Provide teaching via preferred learning methods  Outcome: Progressing     Problem: Neurological Deficit  Goal: Neurological status is stable or improving  Description: Interventions:  - Monitor and assess patient's level of consciousness, motor function, sensory function, and level of assistance needed for ADLs.   - Monitor and report changes from baseline. Collaborate with interdisciplinary team to initiate plan and implement interventions as ordered.   - Provide and maintain a safe environment.  - Consider seizure precautions.  - Consider fall precautions.  - Consider aspiration precautions.  - Consider bleeding precautions.  Outcome: Progressing     Problem: Potential for Aspiration  Goal: Non-ventilated patient's risk of aspiration is minimized  Description: Assess and monitor vital signs, respiratory status, and labs (WBC).  Monitor for signs of aspiration (tachypnea, cough, rales, wheezing, cyanosis, fever).    - Assess and monitor patient's ability to swallow.  - Place patient up in chair to eat if possible.  - HOB up at 90 degrees to eat if unable to get patient up into chair.  - Supervise patient during oral intake.   - Instruct patient/ family to take small bites.  - Instruct patient/ family to take small single sips when taking liquids.  - Follow  patient-specific strategies generated by speech pathologist.  Outcome: Progressing

## 2025-01-12 NOTE — PLAN OF CARE
Problem: OCCUPATIONAL THERAPY ADULT  Goal: Performs self-care activities at highest level of function for planned discharge setting.  See evaluation for individualized goals.  Description: Treatment Interventions: ADL retraining, Functional transfer training, UE strengthening/ROM, Endurance training, Cognitive reorientation, Patient/family training, Equipment evaluation/education, Compensatory technique education, Continued evaluation, Energy conservation, Activityengagement          See flowsheet documentation for full assessment, interventions and recommendations.   Outcome: Progressing  Note: Limitation: Decreased ADL status, Decreased UE ROM, Decreased UE strength, Decreased Safe judgement during ADL, Decreased cognition, Decreased endurance, Decreased fine motor control, Decreased self-care trans, Decreased high-level ADLs, Decreased sensation  Prognosis: Good  Assessment: Pt seen for Occupational Therapy session with focus on activity tolerance, bed mob, functional transfers/mob for pt engagement in UB/LB self-care tasks and energy conservation techniques. Pt cleared by RN/Janell for pt participated in OT session. Pt presented supine/HOB raised pt awake/alert and agreeable to participate in therapy following pt identifiers confirmed. Pt did not report a therapy goal this session however was pleasant and cooperative with all therapy requests.  Pt required assist for UB/LB self-care tasks 2* deconditioning and ballance deficits. Pt remains appropriate for I ([employer]) Pt return to bed post session bed alarm activated and all needs within reach.     Rehab Resource Intensity Level, OT: I (Maximum Resource Intensity)

## 2025-01-12 NOTE — PROGRESS NOTES
Progress Note - Hospitalist   Name: Jose Ramon Quiñonez Jr. 69 y.o. male I MRN: [id number]  Unit/Bed#: [facility] 723-01 I Date of Admission: 1/7/2025   Date of Service: 1/12/2025 I Hospital Day: 5    Assessment & Plan  CVA (cerebral vascular accident) (HCC)  Presented with visual disturbance, LUE numbness/weakness, dysarthria, gait disturbance that started 4 days ago, & felt dizzy  Multiple stroke risk factors including afib not on AC, untreated HTN due to noncompliance with meds, prior history of embolic stroke R cerebellar  CTH:New small infarct in the right occipital lobe that could be acute. No acute hemorrhage.   CTA: Proximal occlusion of the P2 segment of the right PCA. Stable 3 mm ACOM aneurysm. Moderate stenosis cervical left ICA progressed from prior study. 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   Neurology following  MRI brain results reviewed-recent infractions in the right thalamus, and right occipital lobe, right temporal lobe.  This is discontinued because regions of acute/recent infractions are subtended by the right posterior cerebral artery correlating to the P2 segment of the right posterior cerebral artery occlusion seen on yesterday's CTA.  No acute hemorrhagic conversion  Echo reviewed - ejection fraction 65%.  Systolic function is normal.  ASA, statin  Neuro checks, stroke pathway  Goal normotension SBP<130/80  Started on Eliquis.  Outpatient follow-up with neurology  Longstanding persistent atrial fibrillation (HCC)  Rate controlled  Continue metoprolol  Patient was not taking any medication for the past 6 months or so. Has not seen a PCP since 2022  Restart Eliquis 1/10/25  HTN (hypertension)  Patient with hypertensive emergency at the time of presentation.  Restarted back on metoprolol and lisinopril.    Monitor blood pressure  Added amlodipine. -Will split the amlodipine dose due to elevated blood pressure in the morning  List was held yesterday due to  "worsening creatinine.  Restarted since the creatinine  is down  Hypothyroid  Continue levothyroxine-repeat thyroid studies in 4 to 6 weeks as outpatient  RAUDEL (obstructive sleep apnea)  CPAP HS  Aneurysm (HCC)  CTA head & neck w/ & w/o contrast: \"CT Brain: New small infarct in the right occipital lobe that could be acute. No acute hemorrhage. CT Angiography: Proximal occlusion of the P2 segment of the right PCA. Stable 3 mm ACOM aneurysm. Moderate stenosis cervical left ICA progressed from prior study. 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  Unwitnessed fall  Patient had an unwitnessed fall in the emergency room. Patient with left shoulder pain.  X-ray reviewed. Noted to have acute nondisplaced fracture of the greater tuberosity of the humeral head  Ortho recommendations   Nonweightbearing left upper extremity.   Okay for range of motion and activity to tolerance.   Plan for conservative management with sling and repeat x-ray in 2 weeks   Plan for outpatient follow-up with orthopedic  Hyperlipidemia  Lab Results   Component Value Date    CHOLESTEROL 116 01/07/2025    CHOLESTEROL 120 08/22/2020    TRIG 74 01/07/2025    TRIG 92 08/22/2020    HDL 47 01/07/2025    HDL 54 08/22/2020    LDLCALC 54 01/07/2025    LDLCALC 48 08/22/2020   Continue to monitor  Leukocytosis  Resolved  Personal history of noncompliance with medical treatment and regimen  Pt reports noncompliance with medications for the last several months.  Pt has not seen PCP for medication refills  Plan to set up PCP   Nicotine dependence with current use  Pt reports nicotine dependence   0.5 packs/day every day cigarette smoker  Nicotine patch ordered  Promote smoke cessation  Alcohol dependence (HCC)  Family reported that patient drinks at least a case of beer a day.  Patient only endorses 5-6 beers a day. Was given high-dose " thiamine  CIWA protocol in place - Has not required any Ativan.   Continue IV thiamine 500 mg daily then PO thiamine 100 mg daily  Continue folic acid 1 mg daily  Psychosocial stressors  Pt reports psychosocial stressor of recent divorce.  Pt at risk for depression  Recommend cognitive therapy   At risk for increased confusion/delirium, restlessness, agitation, and fall - continue to monitor   Monitor neuro-exam, wakefulness, mood, cognition, insight into deficits and safety awareness   Overstimulation precautions, frequent re-orientation, re-direction, re-assurance  Optimal mood, pain, and sleep management  Plan for possible ARC/IRF disposition  Closed nondisplaced fracture of greater tuberosity of left humerus  As above  Status post total shoulder arthroplasty, left  Per pt - had a hx of L TSA but had excellent recovery.  Patient reports falling in ED during this admission, resulting in new pain and decreased ROM  Orthopedic evaluation appreciated    VTE Pharmacologic Prophylaxis: VTE Score: 8     Mobility:   Basic Mobility Inpatient Raw Score: 22  JH-HLM Goal: 7: Walk 25 feet or more  JH-HLM Achieved: 7: Walk 25 feet or more      Patient Centered Rounds: I performed bedside rounds with nursing staff today.   Discussions with Specialists or Other Care Team Provider:     Education and Discussions with Family / Patient: Updated patient    Current Length of Stay: 5 day(s)  Current Patient Status: Inpatient   Certification Statement: The patient will continue to require additional inpatient hospital stay due to pending rehab placement  Discharge Plan: Anticipate discharge tomorrow to rehab facility.    Code Status: Level 1 - Full Code    Subjective   Patient seen and examined.  Sitting up at the edge of the bed.  Still with numbness.  Had a long conversation with the patient about neurological deficits after stroke.    Objective :  Temp:  [97.5 °F (36.4 °C)-98.1 °F (36.7 °C)] 97.8 °F (36.6 °C)  HR:  [69-85] 75  BP:  (121-166)/() 121/68  Resp:  [18] 18  SpO2:  [97 %-99 %] 99 %  O2 Device: None (Room air)    Body mass index is 25.85 kg/m².     Input and Output Summary (last 24 hours):     Intake/Output Summary (Last 24 hours) at 1/12/2025 1615  Last data filed at 1/12/2025 1142  Gross per 24 hour   Intake 1418 ml   Output 550 ml   Net 868 ml       Physical Exam  Constitutional:       General: He is not in acute distress.     Appearance: He is not ill-appearing.   HENT:      Head: Normocephalic and atraumatic.      Nose: Nose normal. No congestion.   Eyes:      General: No scleral icterus.     Conjunctiva/sclera: Conjunctivae normal.   Cardiovascular:      Rate and Rhythm: Normal rate and regular rhythm.      Heart sounds: No murmur heard.  Pulmonary:      Effort: Pulmonary effort is normal. No respiratory distress.   Abdominal:      General: Bowel sounds are normal. There is no distension.   Musculoskeletal:         General: Tenderness (Tenderness left shoulder) present. Normal range of motion.      Comments:   Left upper extremity in a sling   Skin:     General: Skin is warm.      Coloration: Skin is not jaundiced.   Neurological:      Mental Status: He is alert and oriented to person, place, and time.      Comments: Left upper extremity weakness noted           Lines/Drains:              Lab Results: I have reviewed the following results:   Results from last 7 days   Lab Units 01/11/25  0537   WBC Thousand/uL 7.35   HEMOGLOBIN g/dL 15.8   HEMATOCRIT % 46.5   PLATELETS Thousands/uL 203   SEGS PCT % 66   LYMPHO PCT % 19   MONO PCT % 11   EOS PCT % 3     Results from last 7 days   Lab Units 01/12/25  0508 01/09/25  0529 01/08/25  0458   SODIUM mmol/L 136   < > 136   POTASSIUM mmol/L 3.7   < > 4.2   CHLORIDE mmol/L 102   < > 99   CO2 mmol/L 26   < > 26   BUN mg/dL 25   < > 16   CREATININE mg/dL 1.13   < > 0.88   ANION GAP mmol/L 8   < > 11   CALCIUM mg/dL 8.7   < > 8.8   ALBUMIN g/dL  --   --  3.9   TOTAL BILIRUBIN mg/dL  --    --  1.24*   ALK PHOS U/L  --   --  52   ALT U/L  --   --  17   AST U/L  --   --  25   GLUCOSE RANDOM mg/dL 117   < > 98    < > = values in this interval not displayed.     Results from last 7 days   Lab Units 01/11/25  0537   INR  1.10     Results from last 7 days   Lab Units 01/07/25  1305   POC GLUCOSE mg/dl 94     Results from last 7 days   Lab Units 01/08/25  0458   HEMOGLOBIN A1C % 5.3     Results from last 7 days   Lab Units 01/09/25  0529   PROCALCITONIN ng/ml <0.05       Recent Cultures (last 7 days):               Last 24 Hours Medication List:     Current Facility-Administered Medications:     acetaminophen (TYLENOL) tablet 650 mg, Q6H PRN    amLODIPine (NORVASC) tablet 5 mg, BID    apixaban (ELIQUIS) tablet 5 mg, Q12H    aspirin chewable tablet 81 mg, Daily    atorvastatin (LIPITOR) tablet 40 mg, Daily With Dinner    folic acid (FOLVITE) tablet 1 mg, Daily    hydrALAZINE (APRESOLINE) tablet 25 mg, Q8H PRN    levothyroxine tablet 75 mcg, Early Morning    lidocaine (LIDODERM) 5 % patch 1 patch, Daily    lisinopril (ZESTRIL) tablet 20 mg, Daily    metoprolol tartrate (LOPRESSOR) tablet 25 mg, Q12H MALGORZATA    multivitamin-minerals (CENTRUM) tablet 1 tablet, Daily    nicotine (NICODERM CQ) 21 mg/24 hr TD 24 hr patch 1 patch, Daily    thiamine tablet 100 mg, Daily    Administrative Statements   Today, Patient Was Seen By: Leilani Gutierrez MD      **Please Note: This note may have been constructed using a voice recognition system.**

## 2025-01-12 NOTE — PLAN OF CARE
Problem: PHYSICAL THERAPY ADULT  Goal: Performs mobility at highest level of function for planned discharge setting.  See evaluation for individualized goals.  Description: Treatment/Interventions: Functional transfer training, LE strengthening/ROM, Therapeutic exercise, Endurance training, Elevations, Cognitive reorientation, Patient/family training, Equipment eval/education, Bed mobility, Gait training, Spoke to nursing, Spoke to case management, OT          See flowsheet documentation for full assessment, interventions and recommendations.  1/12/2025 1330 by Briseida Murray, PT  Outcome: Progressing  Note: Prognosis: Good  Problem List: Decreased strength, Decreased endurance, Decreased range of motion, Impaired balance, Decreased mobility, Impaired vision  Assessment: Patient seen for an treatment session with an focus on functional transfers, ambulation, strengthening LE, and dynamic mobility. Required cueing to recall LUE NWB status throughout mobility. Continues to demonstrate lateral veering with mobility without an AD. Patient had 1 mild posterior LOB when attempting to march in place without UE support; required min A x 1 to recover LOB. Tolerated there-ex well today. Continue with PLOC and recommend maximum resource intensity.  Barriers to Discharge: Decreased caregiver support, Inaccessible home environment     Rehab Resource Intensity Level, PT: I (Maximum Resource Intensity)    See flowsheet documentation for full assessment.     1/12/2025 1330 by Briseida Murray, PT  Outcome: Progressing  Note: Prognosis: Good  Problem List: Decreased strength, Decreased endurance, Decreased range of motion, Impaired balance, Decreased mobility, Impaired vision  Assessment: Patient seen for an treatment session with an focus on functional transfers, ambulation, strengthening LE, and dynamic mobility. Required cueing to recall LUE NWB status throughout mobility. Continues to demonstrate lateral veering with  mobility without an AD. Patient had 1 mild posterior LOB when attempting to march in place without UE support; required min A x 1 to recover LOB. Tolerated there-ex well today. Continue with PLOC and recommend maximum resource intensity.  Barriers to Discharge: Decreased caregiver support, Inaccessible home environment     Rehab Resource Intensity Level, PT: I (Maximum Resource Intensity)    See flowsheet documentation for full assessment.

## 2025-01-12 NOTE — PLAN OF CARE
Problem: Potential for Falls  Goal: Patient will remain free of falls  Description: INTERVENTIONS:  - Educate patient/family on patient safety including physical limitations  - Instruct patient to call for assistance with activity   - Consult OT/PT to assist with strengthening/mobility   - Keep Call bell within reach  - Keep bed low and locked with side rails adjusted as appropriate  - Keep care items and personal belongings within reach  - Initiate and maintain comfort rounds  - Make Fall Risk Sign visible to staff  - Offer Toileting every 2 Hours, in advance of need  - Initiate/Maintain bed alarm  - Obtain necessary fall risk management equipment: non skid socks  - Apply yellow socks and bracelet for high fall risk patients  - Consider moving patient to room near nurses station  Outcome: Progressing     Problem: PAIN - ADULT  Goal: Verbalizes/displays adequate comfort level or baseline comfort level  Description: Interventions:  - Encourage patient to monitor pain and request assistance  - Assess pain using appropriate pain scale  - Administer analgesics based on type and severity of pain and evaluate response  - Implement non-pharmacological measures as appropriate and evaluate response  - Consider cultural and social influences on pain and pain management  - Notify physician/advanced practitioner if interventions unsuccessful or patient reports new pain  Outcome: Progressing     Problem: INFECTION - ADULT  Goal: Absence or prevention of progression during hospitalization  Description: INTERVENTIONS:  - Assess and monitor for signs and symptoms of infection  - Monitor lab/diagnostic results  - Monitor all insertion sites, i.e. indwelling lines, tubes, and drains  - Monitor endotracheal if appropriate and nasal secretions for changes in amount and color  - [place] appropriate cooling/warming therapies per order  - Administer medications as ordered  - Instruct and encourage patient and family to use good hand  hygiene technique  - Identify and instruct in appropriate isolation precautions for identified infection/condition  Outcome: Progressing  Goal: Absence of fever/infection during neutropenic period  Description: INTERVENTIONS:  - Monitor WBC    Outcome: Progressing     Problem: SAFETY ADULT  Goal: Patient will remain free of falls  Description: INTERVENTIONS:  - Educate patient/family on patient safety including physical limitations  - Instruct patient to call for assistance with activity   - Consult OT/PT to assist with strengthening/mobility   - Keep Call bell within reach  - Keep bed low and locked with side rails adjusted as appropriate  - Keep care items and personal belongings within reach  - Initiate and maintain comfort rounds  - Make Fall Risk Sign visible to staff  - Offer Toileting every 2 Hours, in advance of need  - Initiate/Maintain bed alarm  - Obtain necessary fall risk management equipment: non skid socks  - Apply yellow socks and bracelet for high fall risk patients  - Consider moving patient to room near nurses station  Outcome: Progressing  Goal: Maintain or return to baseline ADL function  Description: INTERVENTIONS:  -  Assess patient's ability to carry out ADLs; assess patient's baseline for ADL function and identify physical deficits which impact ability to perform ADLs (bathing, care of mouth/teeth, toileting, grooming, dressing, etc.)  - Assess/evaluate cause of self-care deficits   - Assess range of motion  - Assess patient's mobility; develop plan if impaired  - Assess patient's need for assistive devices and provide as appropriate  - Encourage maximum independence but intervene and supervise when necessary  - Involve family in performance of ADLs  - Assess for home care needs following discharge   - Consider OT consult to assist with ADL evaluation and planning for discharge  - Provide patient education as appropriate  Outcome: Progressing  Goal: Maintains/Returns to pre admission  functional level  Description: INTERVENTIONS:  - Perform AM-PAC 6 Click Basic Mobility/ Daily Activity assessment daily.  - Set and communicate daily mobility goal to care team and patient/family/caregiver.   - Collaborate with rehabilitation services on mobility goals if consulted  - Perform Range of Motion 3 times a day.  - Reposition patient every 2 hours.  - Dangle patient 3 times a day  - Stand patient 3 times a day  - Ambulate patient 3 times a day  - Out of bed to chair 3 times a day   - Out of bed for meals 3 times a day  - Out of bed for toileting  - Record patient progress and toleration of activity level   Outcome: Progressing     Problem: DISCHARGE PLANNING  Goal: Discharge to home or other facility with appropriate resources  Description: INTERVENTIONS:  - Identify barriers to discharge w/patient and caregiver  - Arrange for needed discharge resources and transportation as appropriate  - Identify discharge learning needs (meds, wound care, etc.)  - Arrange for interpretive services to assist at discharge as needed  - Refer to Case Management Department for coordinating discharge planning if the patient needs post-hospital services based on physician/advanced practitioner order or complex needs related to functional status, cognitive ability, or social support system  Outcome: Progressing     Problem: Knowledge Deficit  Goal: Patient/family/caregiver demonstrates understanding of disease process, treatment plan, medications, and discharge instructions  Description: Complete learning assessment and assess knowledge base.  Interventions:  - Provide teaching at level of understanding  - Provide teaching via preferred learning methods  Outcome: Progressing     Problem: Neurological Deficit  Goal: Neurological status is stable or improving  Description: Interventions:  - Monitor and assess patient's level of consciousness, motor function, sensory function, and level of assistance needed for ADLs.   - Monitor  and report changes from baseline. Collaborate with interdisciplinary team to initiate plan and implement interventions as ordered.   - Provide and maintain a safe environment.  - Consider seizure precautions.  - Consider fall precautions.  - Consider aspiration precautions.  - Consider bleeding precautions.  Outcome: Progressing     Problem: Activity Intolerance/Impaired Mobility  Goal: Mobility/activity is maintained at optimum level for patient  Description: Interventions:  - Assess and monitor patient  barriers to mobility and need for assistive/adaptive devices.  - Assess patient's emotional response to limitations.  - Collaborate with interdisciplinary team and initiate plans and interventions as ordered.  - Encourage independent activity per ability.  - Maintain proper body alignment.  - Perform active/passive rom as tolerated/ordered.  - Plan activities to conserve energy.  - Turn patient as appropriate  Outcome: Progressing     Problem: Potential for Aspiration  Goal: Non-ventilated patient's risk of aspiration is minimized  Description: Assess and monitor vital signs, respiratory status, and labs (WBC).  Monitor for signs of aspiration (tachypnea, cough, rales, wheezing, cyanosis, fever).    - Assess and monitor patient's ability to swallow.  - Place patient up in chair to eat if possible.  - HOB up at 90 degrees to eat if unable to get patient up into chair.  - Supervise patient during oral intake.   - Instruct patient/ family to take small bites.  - Instruct patient/ family to take small single sips when taking liquids.  - Follow patient-specific strategies generated by speech pathologist.  Outcome: Progressing     Problem: Nutrition  Goal: Nutrition/Hydration status is improving  Description: Monitor and assess patient's nutrition/hydration status for malnutrition (ex- brittle hair, bruises, dry skin, pale skin and conjunctiva, muscle wasting, smooth red tongue, and disorientation). Collaborate with  interdisciplinary team and initiate plan and interventions as ordered.  Monitor patient's weight and dietary intake as ordered or per policy. Utilize nutrition screening tool and intervene per policy. Determine patient's food preferences and provide high-protein, high-caloric foods as appropriate.     - Assist patient with eating.  - Allow adequate time for meals.  - Encourage patient to take dietary supplement as ordered.  - Collaborate with clinical nutritionist.  - Include patient/family/caregiver in decisions related to nutrition.  Outcome: Progressing

## 2025-01-12 NOTE — OCCUPATIONAL THERAPY NOTE
Occupational Therapy Progress Note     Patient Name: Jose Ramon Quiñonez Jr.  Today's Date: 1/12/2025  Problem List  Principal Problem:    CVA (cerebral vascular accident) (HCC)  Active Problems:    Longstanding persistent atrial fibrillation (HCC)    HTN (hypertension)    Hypothyroid    Aneurysm (HCC)    RAUDEL (obstructive sleep apnea)    Unwitnessed fall    Hyperlipidemia    Leukocytosis    Status post total shoulder arthroplasty, left    Personal history of noncompliance with medical treatment and regimen    Nicotine dependence with current use    Alcohol dependence (HCC)    Psychosocial stressors    Closed nondisplaced fracture of greater tuberosity of left humerus       Occupational Therapy Treatment Note       01/12/25 0919   OT Last Visit   OT Visit Date 01/12/25   Note Type   Note Type Treatment for insurance authorization   Pain Assessment   Pain Assessment Tool 0-10   Pain Location/Orientation Orientation: Left   Restrictions/Precautions   Weight Bearing Precautions Per Order Yes   LUE Weight Bearing Per Order (S)  NWB   Braces or Orthoses Sling  (LUE)   Lifestyle   Autonomy I with ADL's/IaDL's, no AD with functional mobility +drives   Reciprocal Relationships daughters, cousin   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   Intrinsic Gratification listening to music, used to play in a band   ADL   Where Assessed Standing at sink   Grooming Assistance 5  Supervision/Setup   Grooming Deficit Setup;Wash/dry hands   UB Dressing Assistance 5  Supervision/Setup   LB Dressing Assistance 5  Supervision/Setup   LB Dressing Deficit Thread RLE into pants;Thread LLE into pants;Pull up over hips   Toileting Assistance  5  Supervision/Setup   Toileting Deficit Clothing management up;Clothing management down;Perineal hygiene   Bed Mobility   Supine to Sit 5  Supervision   Additional items Assist x 1   Sit to Supine 5  Supervision   Additional items Assist x 1   Transfers   Sit to Stand 5  Supervision   Additional items  "Assist x 1   Stand to Sit 5  Supervision   Additional items Assist x 1   Subjective   Subjective pt stated \" I knew I had a stroke \"   Cognition   Overall Cognitive Status WFL   Arousal/Participation Cooperative   Attention Within functional limits   Orientation Level Oriented X4   Memory Within functional limits   Following Commands Follows all commands and directions without difficulty   Activity Tolerance   Activity Tolerance Patient tolerated treatment well   Assessment   Assessment Pt seen for Occupational Therapy session with focus on activity tolerance, bed mob, functional transfers/mob for pt engagement in UB/LB self-care tasks and energy conservation techniques. Pt cleared by RN/Janell for pt participated in OT session. Pt presented supine/HOB raised pt awake/alert and agreeable to participate in therapy following pt identifiers confirmed. Pt did not report a therapy goal this session however was pleasant and cooperative with all therapy requests.  Pt required assist for UB/LB self-care tasks 2* deconditioning and ballance deficits. Pt remains appropriate for I (Maximum Resources) Pt return to bed post session bed alarm activated and all needs within reach.   Plan   Treatment Interventions ADL retraining;Functional transfer training;Patient/family training   Goal Expiration Date 01/23/25   OT Treatment Day 1   OT Frequency 3-5x/wk   Discharge Recommendation   Rehab Resource Intensity Level, OT I (Maximum Resource Intensity)   AM-PAC Daily Activity Inpatient   Lower Body Dressing 4   Bathing 4   Toileting 4   Upper Body Dressing 4   Grooming 4   Eating 4   Daily Activity Raw Score 24   Daily Activity Standardized Score (Calc for Raw Score >=11) 57.54   AM-PAC Applied Cognition Inpatient   Following a Speech/Presentation 3   Understanding Ordinary Conversation 4   Taking Medications 3   Remembering Where Things Are Placed or Put Away 4   Remembering List of 4-5 Errands 3   Taking Care of Complicated Tasks 2 "   Applied Cognition Raw Score 19   Applied Cognition Standardized Score 39.77   Barthel Index   Feeding 10   Bathing 0   Grooming Score 0   Dressing Score 5   Bladder Score 10   Bowels Score 10   Toilet Use Score 5   Transfers (Bed/Chair) Score 10   Mobility (Level Surface) Score 10   Stairs Score 5   Barthel Index Score 65       Juliann BLACKWELL/RENETTA

## 2025-01-12 NOTE — PHYSICAL THERAPY NOTE
Physical Therapy Treatment    Patient Name: Jose Ramon Quiñonez Jr.    Today's Date: 1/12/2025     Problem List  Principal Problem:    CVA (cerebral vascular accident) (HCC)  Active Problems:    Longstanding persistent atrial fibrillation (HCC)    HTN (hypertension)    Hypothyroid    Aneurysm (HCC)    RAUDEL (obstructive sleep apnea)    Unwitnessed fall    Hyperlipidemia    Leukocytosis    Status post total shoulder arthroplasty, left    Personal history of noncompliance with medical treatment and regimen    Nicotine dependence with current use    Alcohol dependence (HCC)    Psychosocial stressors    Closed nondisplaced fracture of greater tuberosity of left humerus       Past Medical History  Past Medical History:   Diagnosis Date    A-fib (HCC)     Disease of thyroid gland     Hypertension     Stroke (HCC)         Past Surgical History  Past Surgical History:   Procedure Laterality Date    ROTATOR CUFF REPAIR           01/12/25 1056   PT Last Visit   PT Visit Date 01/12/25   Note Type   Note Type Treatment for insurance authorization   Education   Education Provided Yes   End of Consult   Patient Position at End of Consult Seated edge of bed;Bed/Chair alarm activated;All needs within reach   Pain Assessment   Pain Assessment Tool 0-10   Pain Score 3   Pain Location/Orientation Orientation: Left;Location: Shoulder   Restrictions/Precautions   Weight Bearing Precautions Per Order Yes   LUE Weight Bearing Per Order NWB   Braces or Orthoses Sling   Other Precautions Chair Alarm;Cognitive;Bed Alarm;Fall Risk;Pain   General   Chart Reviewed Yes   Cognition   Overall Cognitive Status WFL   Arousal/Participation Cooperative   Attention Within functional limits   Orientation Level Oriented X4   Memory Within functional limits   Following Commands Follows all commands and directions without difficulty   Comments Pleasant; cueing to receall weight precaution   Bed Mobility   Supine  to Sit 5  Supervision   Additional items HOB elevated;Increased time required   Sit to Supine 5  Supervision   Additional items Increased time required   Transfers   Sit to Stand 5  Supervision   Additional items Increased time required   Stand to Sit 5  Supervision   Additional items Increased time required   Additional Comments no AD   Ambulation/Elevation   Gait pattern Excessively slow;Shuffling;Decreased foot clearance  (veering towrad both direction)   Gait Assistance 4  Minimal assist  (contact guard)   Additional items Assist x 1;Verbal cues   Assistive Device None   Distance 120 feet x 2   Balance   Static Sitting Fair +   Dynamic Sitting Fair   Static Standing Fair -   Dynamic Standing Poor +   Ambulatory Poor +   Endurance Deficit   Endurance Deficit Yes   Activity Tolerance   Activity Tolerance Patient tolerated treatment well   Nurse Made Aware Yes   Exercises   Hip Flexion Standing  (x30, RUE support, attempted without AD, posterior LOB)   Ankle Pumps Standing;15 reps;AROM;Bilateral   Squat Standing;15 reps;Bilateral  (RUE support)   Balance training  lateral stepping 10 feet x 4   Assessment   Prognosis Good   Problem List Decreased strength;Decreased endurance;Decreased range of motion;Impaired balance;Decreased mobility;Impaired vision   Assessment Patient seen for an treatment session with an focus on functional transfers, ambulation, strengthening LE, and dynamic mobility. Required cueing to recall LUE NWB status throughout mobility. Continues to demonstrate lateral veering with mobility without an AD. Patient had 1 mild posterior LOB when attempting to march in place without UE support; required min A x 1 to recover LOB. Tolerated there-ex well today. Continue with PLOC and recommend maximum resource intensity.   Barriers to Discharge Decreased caregiver support;Inaccessible home environment   Goals   Patient Goals To leave the hospital   STG Expiration Date 01/23/25   PT Treatment Day 2   Plan    Treatment/Interventions Therapeutic exercise;LE strengthening/ROM;Functional transfer training;Elevations   Progress Progressing toward goals   PT Frequency 3-5x/wk   Discharge Recommendation   Rehab Resource Intensity Level, PT I (Maximum Resource Intensity)   Equipment Recommended Cane   AM-PAC Basic Mobility Inpatient   Turning in Flat Bed Without Bedrails 4   Lying on Back to Sitting on Edge of Flat Bed Without Bedrails 4   Moving Bed to Chair 4   Standing Up From Chair Using Arms 4   Walk in Room 3   Climb 3-5 Stairs With Railing 3   Basic Mobility Inpatient Raw Score 22   Basic Mobility Standardized Score 47.4   [facility] Highest Level Of Mobility   -HLM Goal 7: Walk 25 feet or more   -HLM Achieved 7: Walk 25 feet or more   End of Consult   Patient Position at End of Consult Seated edge of bed;Bed/Chair alarm activated;All needs within reach       Briseida Kelley PT, DPT

## 2025-01-12 NOTE — PLAN OF CARE
Problem: PHYSICAL THERAPY ADULT  Goal: Performs mobility at highest level of function for planned discharge setting.  See evaluation for individualized goals.  Description: Treatment/Interventions: Functional transfer training, LE strengthening/ROM, Therapeutic exercise, Endurance training, Elevations, Cognitive reorientation, Patient/family training, Equipment eval/education, Bed mobility, Gait training, Spoke to nursing, Spoke to case management, OT          See flowsheet documentation for full assessment, interventions and recommendations.  Outcome: Progressing  Note: Prognosis: Good  Problem List: Decreased strength, Decreased endurance, Decreased range of motion, Impaired balance, Decreased mobility, Impaired vision  Assessment: Patient seen for an treatment session with an focus on functional transfers, ambulation, strengthening LE, and dynamic mobility. Required cueing to recall LUE NWB status throughout mobility. Continues to demonstrate lateral veering with mobility without an AD. Patient had 1 mild posterior LOB when attempting to march in place without UE support; required min A x 1 to recover LOB. Tolerated there-ex well today. Continue with PLOC and recommend maximum resource intensity.  Barriers to Discharge: Decreased caregiver support, Inaccessible home environment     Rehab Resource Intensity Level, PT: I (Maximum Resource Intensity)    See flowsheet documentation for full assessment.

## 2025-01-13 PROCEDURE — 99232 SBSQ HOSP IP/OBS MODERATE 35: CPT | Performed by: FAMILY MEDICINE

## 2025-01-13 RX ADMIN — THIAMINE HCL TAB 100 MG 100 MG: 100 TAB at 08:02

## 2025-01-13 RX ADMIN — AMLODIPINE BESYLATE 2.5 MG: 2.5 TABLET ORAL at 20:43

## 2025-01-13 RX ADMIN — METOPROLOL TARTRATE 25 MG: 25 TABLET, FILM COATED ORAL at 08:02

## 2025-01-13 RX ADMIN — AMLODIPINE BESYLATE 2.5 MG: 2.5 TABLET ORAL at 08:02

## 2025-01-13 RX ADMIN — APIXABAN 5 MG: 5 TABLET, FILM COATED ORAL at 20:43

## 2025-01-13 RX ADMIN — ACETAMINOPHEN 650 MG: 325 TABLET, FILM COATED ORAL at 02:13

## 2025-01-13 RX ADMIN — ASPIRIN 81 MG CHEWABLE TABLET 81 MG: 81 TABLET CHEWABLE at 08:02

## 2025-01-13 RX ADMIN — APIXABAN 5 MG: 5 TABLET, FILM COATED ORAL at 08:02

## 2025-01-13 RX ADMIN — LIDOCAINE 1 PATCH: 50 PATCH TOPICAL at 08:03

## 2025-01-13 RX ADMIN — Medication 2.5 MG: at 20:43

## 2025-01-13 RX ADMIN — ATORVASTATIN CALCIUM 40 MG: 40 TABLET, FILM COATED ORAL at 15:45

## 2025-01-13 RX ADMIN — NICOTINE 1 PATCH: 21 PATCH, EXTENDED RELEASE TRANSDERMAL at 08:12

## 2025-01-13 RX ADMIN — LEVOTHYROXINE SODIUM 75 MCG: 75 TABLET ORAL at 05:58

## 2025-01-13 RX ADMIN — METOPROLOL TARTRATE 25 MG: 25 TABLET, FILM COATED ORAL at 20:43

## 2025-01-13 RX ADMIN — LISINOPRIL 20 MG: 20 TABLET ORAL at 08:02

## 2025-01-13 RX ADMIN — FOLIC ACID 1 MG: 1 TABLET ORAL at 08:02

## 2025-01-13 RX ADMIN — Medication 1 TABLET: at 08:02

## 2025-01-13 RX ADMIN — Medication 2.5 MG: at 16:36

## 2025-01-13 NOTE — PLAN OF CARE
Problem: Potential for Falls  Goal: Patient will remain free of falls  Description: INTERVENTIONS:  - Educate patient/family on patient safety including physical limitations  - Instruct patient to call for assistance with activity   - Consult OT/PT to assist with strengthening/mobility   - Keep Call bell within reach  - Keep bed low and locked with side rails adjusted as appropriate  - Keep care items and personal belongings within reach  - Initiate and maintain comfort rounds  - Make Fall Risk Sign visible to staff  - Offer Toileting every 2 Hours, in advance of need  - Initiate/Maintain bed alarm  - Obtain necessary fall risk management equipment: non skid socks  - Apply yellow socks and bracelet for high fall risk patients  - Consider moving patient to room near nurses station  Outcome: Progressing     Problem: PAIN - ADULT  Goal: Verbalizes/displays adequate comfort level or baseline comfort level  Description: Interventions:  - Encourage patient to monitor pain and request assistance  - Assess pain using appropriate pain scale  - Administer analgesics based on type and severity of pain and evaluate response  - Implement non-pharmacological measures as appropriate and evaluate response  - Consider cultural and social influences on pain and pain management  - Notify physician/advanced practitioner if interventions unsuccessful or patient reports new pain  Outcome: Progressing     Problem: INFECTION - ADULT  Goal: Absence or prevention of progression during hospitalization  Description: INTERVENTIONS:  - Assess and monitor for signs and symptoms of infection  - Monitor lab/diagnostic results  - Monitor all insertion sites, i.e. indwelling lines, tubes, and drains  - Monitor endotracheal if appropriate and nasal secretions for changes in amount and color  - [place] appropriate cooling/warming therapies per order  - Administer medications as ordered  - Instruct and encourage patient and family to use good hand  hygiene technique  - Identify and instruct in appropriate isolation precautions for identified infection/condition  Outcome: Progressing  Goal: Absence of fever/infection during neutropenic period  Description: INTERVENTIONS:  - Monitor WBC    Outcome: Progressing

## 2025-01-13 NOTE — PLAN OF CARE
Problem: Potential for Falls  Goal: Patient will remain free of falls  Description: INTERVENTIONS:  - Educate patient/family on patient safety including physical limitations  - Instruct patient to call for assistance with activity   - Consult OT/PT to assist with strengthening/mobility   - Keep Call bell within reach  - Keep bed low and locked with side rails adjusted as appropriate  - Keep care items and personal belongings within reach  - Initiate and maintain comfort rounds  - Make Fall Risk Sign visible to staff  - Offer Toileting every 2 Hours, in advance of need  - Initiate/Maintain bed alarm  - Obtain necessary fall risk management equipment: non skid socks  - Apply yellow socks and bracelet for high fall risk patients  - Consider moving patient to room near nurses station  Outcome: Progressing     Problem: PAIN - ADULT  Goal: Verbalizes/displays adequate comfort level or baseline comfort level  Description: Interventions:  - Encourage patient to monitor pain and request assistance  - Assess pain using appropriate pain scale  - Administer analgesics based on type and severity of pain and evaluate response  - Implement non-pharmacological measures as appropriate and evaluate response  - Consider cultural and social influences on pain and pain management  - Notify physician/advanced practitioner if interventions unsuccessful or patient reports new pain  Outcome: Progressing     Problem: INFECTION - ADULT  Goal: Absence or prevention of progression during hospitalization  Description: INTERVENTIONS:  - Assess and monitor for signs and symptoms of infection  - Monitor lab/diagnostic results  - Monitor all insertion sites, i.e. indwelling lines, tubes, and drains  - Monitor endotracheal if appropriate and nasal secretions for changes in amount and color  - [place] appropriate cooling/warming therapies per order  - Administer medications as ordered  - Instruct and encourage patient and family to use good hand  hygiene technique  - Identify and instruct in appropriate isolation precautions for identified infection/condition  Outcome: Progressing     Problem: SAFETY ADULT  Goal: Patient will remain free of falls  Description: INTERVENTIONS:  - Educate patient/family on patient safety including physical limitations  - Instruct patient to call for assistance with activity   - Consult OT/PT to assist with strengthening/mobility   - Keep Call bell within reach  - Keep bed low and locked with side rails adjusted as appropriate  - Keep care items and personal belongings within reach  - Initiate and maintain comfort rounds  - Make Fall Risk Sign visible to staff  - Offer Toileting every 2 Hours, in advance of need  - Initiate/Maintain bed alarm  - Obtain necessary fall risk management equipment: non skid socks  - Apply yellow socks and bracelet for high fall risk patients  - Consider moving patient to room near nurses station  Outcome: Progressing     Problem: DISCHARGE PLANNING  Goal: Discharge to home or other facility with appropriate resources  Description: INTERVENTIONS:  - Identify barriers to discharge w/patient and caregiver  - Arrange for needed discharge resources and transportation as appropriate  - Identify discharge learning needs (meds, wound care, etc.)  - Arrange for interpretive services to assist at discharge as needed  - Refer to Case Management Department for coordinating discharge planning if the patient needs post-hospital services based on physician/advanced practitioner order or complex needs related to functional status, cognitive ability, or social support system  Outcome: Progressing     Problem: Knowledge Deficit  Goal: Patient/family/caregiver demonstrates understanding of disease process, treatment plan, medications, and discharge instructions  Description: Complete learning assessment and assess knowledge base.  Interventions:  - Provide teaching at level of understanding  - Provide teaching via  preferred learning methods  Outcome: Progressing     Problem: Neurological Deficit  Goal: Neurological status is stable or improving  Description: Interventions:  - Monitor and assess patient's level of consciousness, motor function, sensory function, and level of assistance needed for ADLs.   - Monitor and report changes from baseline. Collaborate with interdisciplinary team to initiate plan and implement interventions as ordered.   - Provide and maintain a safe environment.  - Consider seizure precautions.  - Consider fall precautions.  - Consider aspiration precautions.  - Consider bleeding precautions.  Outcome: Progressing

## 2025-01-13 NOTE — RESTORATIVE TECHNICIAN NOTE
Restorative Technician Note      Patient Name: Jose Ramon Quiñonez JrNicolas     Note Type: Mobility  Patient Position Upon Consult: Supine  Activity Performed: Ambulated; Dangled; Stood  Assistive Device: Other (Comment) (none)  Education Provided: Yes  Patient Position at End of Consult: Supine; All needs within reach; Bed/Chair alarm activated    Yandel WILLIS, Restorative Technician,

## 2025-01-13 NOTE — CASE MANAGEMENT
Case Management Discharge Planning Note    Patient name Jose Ramon Quiñonez Jr.  Location [facility]-01 MRN [id number]  : 1955 Date 2025       Current Admission Date: 2025  Current Admission Diagnosis:CVA (cerebral vascular accident) (HCC)   Patient Active Problem List    Diagnosis Date Noted Date Diagnosed    RAUDEL (obstructive sleep apnea) 2025     Unwitnessed fall 2025     Hyperlipidemia 2025     Leukocytosis 2025     Status post total shoulder arthroplasty, left 2025     Personal history of noncompliance with medical treatment and regimen 2025     Nicotine dependence with current use 2025     Alcohol dependence (HCC) 2025     Psychosocial stressors 2025     Closed nondisplaced fracture of greater tuberosity of left humerus 2025     Hypothyroid 2020     Aneurysm (HCC) 2020     Longstanding persistent atrial fibrillation (HCC) 2020     HTN (hypertension) 2020     CVA (cerebral vascular accident) (HCC) 2020       LOS (days): 6  Geometric Mean LOS (GMLOS) (days): 2.8  Days to GMLOS:-2.8     OBJECTIVE:  Risk of Unplanned Readmission Score: 9.43         Current admission status: Inpatient   Preferred Pharmacy:   [id number] - [place] - [address]  [address]  [place] [address]  Phone: [phone] Fax: [phone]    [facility] - [place] - [address]  [address]  [place] [address]  Phone: [phone] Fax: [phone]    [id number] - [place] - [address]   [place] [address]  Phone: [phone] Fax: [phone]    Primary Care Provider: Margarito Grant MD    Primary Insurance: MEDICARE  Secondary Insurance:     DISCHARGE DETAILS:  CM notified hat there is no bed available at [facility]/ however there is a bed at [facility] shared with no bathroom. Pt daughter reached out to  and she spoke with [facility]  who told her they have an available bed. CM reached out to [facility]   CM will continue to follow       ADDENDUM:  CM spoke with pt daughter Alicia, she is still in agreement with [facility]/ since this was pt first choice. CM advised [facility] will have a bed tomorrow

## 2025-01-14 VITALS
HEART RATE: 71 BPM | TEMPERATURE: 97.8 F | WEIGHT: 170 LBS | OXYGEN SATURATION: 97 % | BODY MASS INDEX: 25.76 KG/M2 | HEIGHT: 68 IN | DIASTOLIC BLOOD PRESSURE: 89 MMHG | SYSTOLIC BLOOD PRESSURE: 136 MMHG | RESPIRATION RATE: 14 BRPM

## 2025-01-14 PROCEDURE — 97530 THERAPEUTIC ACTIVITIES: CPT

## 2025-01-14 PROCEDURE — 99239 HOSP IP/OBS DSCHRG MGMT >30: CPT | Performed by: INTERNAL MEDICINE

## 2025-01-14 PROCEDURE — 97116 GAIT TRAINING THERAPY: CPT

## 2025-01-14 RX ORDER — LEVOTHYROXINE SODIUM 75 UG/1
75 TABLET ORAL
Qty: 30 TABLET | Refills: 0 | Status: SHIPPED | OUTPATIENT
Start: 2025-01-14

## 2025-01-14 RX ORDER — ATORVASTATIN CALCIUM 20 MG/1
20 TABLET, FILM COATED ORAL
Qty: 30 TABLET | Refills: 0 | Status: SHIPPED | OUTPATIENT
Start: 2025-01-14

## 2025-01-14 RX ORDER — LANOLIN ALCOHOL/MO/W.PET/CERES
100 CREAM (GRAM) TOPICAL DAILY
Qty: 30 TABLET | Refills: 0 | Status: SHIPPED | OUTPATIENT
Start: 2025-01-15

## 2025-01-14 RX ORDER — AMLODIPINE BESYLATE 2.5 MG/1
2.5 TABLET ORAL 2 TIMES DAILY
Qty: 60 TABLET | Refills: 0 | Status: SHIPPED | OUTPATIENT
Start: 2025-01-14

## 2025-01-14 RX ORDER — METOPROLOL TARTRATE 25 MG/1
25 TABLET, FILM COATED ORAL EVERY 12 HOURS SCHEDULED
Qty: 60 TABLET | Refills: 0 | Status: SHIPPED | OUTPATIENT
Start: 2025-01-14

## 2025-01-14 RX ORDER — ASPIRIN 81 MG/1
81 TABLET, CHEWABLE ORAL DAILY
Qty: 30 TABLET | Refills: 0 | Status: SHIPPED | OUTPATIENT
Start: 2025-01-14

## 2025-01-14 RX ORDER — LISINOPRIL 20 MG/1
20 TABLET ORAL DAILY
Qty: 30 TABLET | Refills: 0 | Status: SHIPPED | OUTPATIENT
Start: 2025-01-14

## 2025-01-14 RX ORDER — FOLIC ACID 1 MG/1
1 TABLET ORAL DAILY
Qty: 30 TABLET | Refills: 0 | Status: SHIPPED | OUTPATIENT
Start: 2025-01-15

## 2025-01-14 RX ADMIN — Medication 1 TABLET: at 08:48

## 2025-01-14 RX ADMIN — AMLODIPINE BESYLATE 2.5 MG: 2.5 TABLET ORAL at 08:50

## 2025-01-14 RX ADMIN — APIXABAN 5 MG: 5 TABLET, FILM COATED ORAL at 08:49

## 2025-01-14 RX ADMIN — METOPROLOL TARTRATE 25 MG: 25 TABLET, FILM COATED ORAL at 08:50

## 2025-01-14 RX ADMIN — LISINOPRIL 20 MG: 20 TABLET ORAL at 08:50

## 2025-01-14 RX ADMIN — Medication 2.5 MG: at 09:03

## 2025-01-14 RX ADMIN — Medication 2.5 MG: at 03:51

## 2025-01-14 RX ADMIN — THIAMINE HCL TAB 100 MG 100 MG: 100 TAB at 08:48

## 2025-01-14 RX ADMIN — ASPIRIN 81 MG CHEWABLE TABLET 81 MG: 81 TABLET CHEWABLE at 08:48

## 2025-01-14 RX ADMIN — LIDOCAINE 1 PATCH: 50 PATCH TOPICAL at 08:53

## 2025-01-14 RX ADMIN — FOLIC ACID 1 MG: 1 TABLET ORAL at 08:49

## 2025-01-14 RX ADMIN — LEVOTHYROXINE SODIUM 75 MCG: 75 TABLET ORAL at 05:14

## 2025-01-14 NOTE — PROGRESS NOTES
Patient signed bed alarm refusal form. Will continue to educate on importance of bed alarm. Signed form in patient chart.

## 2025-01-14 NOTE — ASSESSMENT & PLAN NOTE
Ortho evaluation appreciated.  Left upper extremity nonweightbearing and in a sling.  Patient is noncompliant with sling and also weightbearing instructions  Pain control

## 2025-01-14 NOTE — ED PROVIDER NOTES
Time reflects when diagnosis was documented in both MDM as applicable and the Disposition within this note       Time User Action Codes Description Comment    1/7/2025  7:00 PM Joey Preston Add [I63.531] Acute ischemic right PCA stroke (HCC)     1/7/2025 10:23 PM Surya Rileyncer Add [I63.9] Cerebellar stroke     1/8/2025  9:53 AM Nimako-Feli, Chester Add [I48.11] Longstanding persistent atrial fibrillation (HCC)     1/8/2025  9:53 AM Nimako-Feli, Chester Add [I63.9] Cerebrovascular accident (CVA), unspecified mechanism (HCC)     1/8/2025 12:52 PM Nimako-Feli, Chester Add [M25.512] Acute pain of left shoulder     1/9/2025  8:34 AM Matt Leilani RIGO Add [F10.20] Alcohol dependence (HCC)           ED Disposition       ED Disposition   Admit    Condition   Stable    Date/Time   Tue Jan 7, 2025 10:15 PM    Comment   Case was discussed with RADHA SR and the patient's admission status was agreed to be Admission Status: inpatient status to the service of Dr. Lowry.               Assessment & Plan       Medical Decision Making  Patient is a 69 y.o. male who presents to the ED with focal neurological deficit x 4 days.    Vital signs notable for hypertension. CT scan with new proximal occlusion of the right PCA.  Patient outside of the window for thrombolytics, catheter directed thrombolysis, mechanical clot retrieval. Exam as listed below.    Plan: Patient admitted to medicine.    View ED course above for further discussion on patient workup.     All labs reviewed and utilized in the medical decision making process  All radiology studies independently viewed by me and interpreted by the radiologist.  I reviewed all testing with the patient.     Amount and/or Complexity of Data Reviewed  Labs: ordered.  Radiology: ordered and independent interpretation performed.    Risk  OTC drugs.  Prescription drug management.  Decision regarding hospitalization.             Medications   acetaminophen (TYLENOL) tablet 650 mg (has no administration in  time range)   aspirin chewable tablet 81 mg (has no administration in time range)   levothyroxine tablet 75 mcg (75 mcg Oral Given 1/8/25 0528)   nicotine (NICODERM CQ) 21 mg/24 hr TD 24 hr patch 1 patch (has no administration in time range)   atorvastatin (LIPITOR) tablet 40 mg (40 mg Oral Given 1/7/25 2303)   metoprolol tartrate (LOPRESSOR) tablet 25 mg (has no administration in time range)   labetalol (NORMODYNE) injection 10 mg (10 mg Intravenous Given 1/7/25 1643)   iohexol (OMNIPAQUE) 350 MG/ML injection (MULTI-DOSE) 85 mL (85 mL Intravenous Given 1/7/25 1721)   acetaminophen (Ofirmev) injection 1,000 mg (0 mg Intravenous Stopped 1/7/25 1936)   aspirin tablet 325 mg (325 mg Oral Given 1/7/25 1916)   clopidogrel (PLAVIX) tablet 600 mg (600 mg Oral Given 1/7/25 1933)   labetalol (NORMODYNE) injection 10 mg (10 mg Intravenous Given 1/7/25 1933)   labetalol (NORMODYNE) injection 10 mg (10 mg Intravenous Given 1/7/25 2154)       ED Risk Strat Scores               CIWA-Ar Score       Row Name 01/12/25 21:07:27 01/11/25 21:10:16          CIWA-Ar    Nausea and Vomiting 0 0     Tactile Disturbances 0 0     Tremor 0 0     Auditory Disturbances 0 0     Paroxysmal Sweats 0 0     Visual Disturbances 0 0     Anxiety 0 0     Headache, Fullness in Head 0 0     Agitation 0 0     Orientation and Clouding of Sensorium 0 0     CIWA-Ar Total 0 0                                                 History of Present Illness       Chief Complaint   Patient presents with    Numbness     Pt brought in vis EMS with cc of numbness in face and left arm.  Hx of stroke and states this feels like this.  No thinners, hx of afib.  EMS reports suspected ETOH.       Past Medical History:   Diagnosis Date    A-fib (HCC)     Disease of thyroid gland     Hypertension     Stroke (HCC)       Past Surgical History:   Procedure Laterality Date    ROTATOR CUFF REPAIR        Family History   Problem Relation Age of Onset    No Known Problems Mother      Diabetes Father       Social History     Tobacco Use    Smoking status: Every Day     Current packs/day: 0.50     Types: Cigarettes    Smokeless tobacco: Never   Vaping Use    Vaping status: Never Used   Substance Use Topics    Alcohol use: Yes    Drug use: Not Currently      E-Cigarette/Vaping    E-Cigarette Use Never User       E-Cigarette/Vaping Substances      I have reviewed and agree with the history as documented.     This is a 69-year-old male with past medical history of previous CVA, atrial fibrillation, hypertension, hyperlipidemia, who presents to the emergency room with left-sided hand numbness and upper extremity weakness with left visual changes for the last 4 days.  Patient denies that he is on any active anticoagulation and states his primary care provider discontinued it sometime ago.  Patient states that he first noticed a slight visual disturbance when he was at work 4 days prior, reporting some visual disturbance which he believed was object sinus peripheral vision which were not there.  He states that the day after he began to notice abnormal sensation in his left hand and decreased strength of his left extremity.  He also feels that his speech is subjectively different.  Denies recent fever, illness, nausea or vomiting, difficulty with swallowing.          Review of Systems   Constitutional:  Negative for chills and fever.   HENT:  Negative for ear pain and sore throat.    Eyes:  Negative for pain and visual disturbance.   Respiratory:  Negative for cough and shortness of breath.    Cardiovascular:  Negative for chest pain and palpitations.   Gastrointestinal:  Negative for abdominal pain and vomiting.   Genitourinary:  Negative for dysuria and hematuria.   Musculoskeletal:  Negative for arthralgias and back pain.   Skin:  Negative for color change and rash.   Neurological:  Positive for numbness. Negative for seizures and syncope.   All other systems reviewed and are  negative.          Objective       ED Triage Vitals   Temperature Pulse Blood Pressure Respirations SpO2 Patient Position - Orthostatic VS   01/07/25 1332 01/07/25 1305 01/07/25 1305 01/07/25 1305 01/07/25 1305 01/07/25 1305   98 °F (36.7 °C) (!) 110 (!) 231/124 18 98 % Sitting      Temp Source Heart Rate Source BP Location FiO2 (%) Pain Score    01/07/25 1332 01/07/25 1305 01/07/25 1305 -- 01/07/25 1800    Tympanic Monitor Right arm  6      Vitals      Date and Time Temp Pulse SpO2 Resp BP Pain Score FACES Pain Rating User   01/14/25 0714 97.8 °F (36.6 °C) 71 97 % 14 102/73 -- -- DII   01/14/25 0351 -- -- -- -- -- 7 -- AP   01/13/25 2043 -- -- -- 16 -- 8 -- AP   01/13/25 2043 98.3 °F (36.8 °C) -- -- -- -- -- -- AB   01/13/25 2042 -- 82 99 % -- 142/100 -- -- DII   01/13/25 1636 -- -- -- -- -- 10 - Worst Possible Pain -- KB   01/13/25 1320 97.5 °F (36.4 °C) 74 99 % -- 136/86 -- -- DII   01/13/25 0814 -- -- -- -- -- 2 -- SQ   01/13/25 0811 97.1 °F (36.2 °C) 80 99 % 20 149/121 -- -- DII   01/13/25 0557 -- 75 98 % 16 136/98 -- -- DII   01/13/25 0213 -- -- -- -- -- 4 -- BB   01/12/25 2107 -- -- -- 16 -- -- -- BB   01/12/25 2107 99 °F (37.2 °C) 87 98 % -- 138/101 -- -- DII   01/12/25 1943 -- -- -- -- -- 3 -- BB   01/12/25 1514 97.8 °F (36.6 °C) 75 99 % 18 121/68 -- -- DII   01/12/25 1259 -- 81 99 % -- 138/85 -- -- DII   01/12/25 1142 -- 69 98 % -- 152/104 -- -- DII   01/12/25 1056 -- -- -- -- -- 3 -- EG   01/12/25 0800 -- -- -- -- -- 3 -- ES   01/12/25 0751 97.5 °F (36.4 °C) 82 97 % 18 165/112 -- -- DII   01/12/25 0750 97.5 °F (36.4 °C) 85 98 % 18 166/115 -- -- DII   01/12/25 0512 -- -- -- -- -- 3 -- BB   01/11/25 2110 98.1 °F (36.7 °C) 82 97 % -- 150/96 -- -- DII   01/11/25 1954 -- -- -- -- -- No Pain -- BB   01/11/25 1548 97.9 °F (36.6 °C) 79 99 % -- 142/95 -- -- DII   01/11/25 0801 98.8 °F (37.1 °C) 82 97 % 18 144/92 -- -- DII   01/11/25 0800 -- -- -- -- -- 4 -- ES   01/11/25 0709 -- -- -- -- -- 3 -- BB   01/11/25 0037  "-- 69 98 % -- 153/102 -- -- DII   01/10/25 2105 -- -- -- 16 -- -- --    01/10/25 2105 98 °F (36.7 °C) 77 97 % -- 148/94 -- -- DII   01/10/25 2000 -- -- -- -- -- No Pain --    01/10/25 1705 -- 81 97 % -- 156/96 -- -- [facility]   01/10/25 1610 97.9 °F (36.6 °C) 81 95 % -- 129/92 -- -- DII   01/10/25 1514 97.5 °F (36.4 °C) 75 95 % -- 129/92 -- -- [facility]   01/10/25 1513 -- -- -- -- -- 9 --    01/10/25 1506 -- -- -- -- 129/92 -- --    01/10/25 1412 -- -- -- -- -- No Pain --    01/10/25 1211 -- 76 95 % -- 152/96 -- -- [facility]   01/10/25 0905 -- -- -- -- -- 9 --    01/10/25 0800 -- -- -- -- -- -- pt states \"come back later\" --    01/10/25 0716 97.7 °F (36.5 °C) -- -- 16 159/102 -- -- [facility]   01/10/25 0303 -- -- -- -- 155/90 -- --    01/10/25 0301 -- 87 98 % -- 157/100 -- -- [facility]   01/10/25 0240 -- 84 95 % -- 166/95 -- --    01/09/25 2359 -- -- -- -- 167/98 prn hydralazine -- --    01/09/25 2359 -- 77 97 % -- -- -- -- [facility]   01/09/25 2115 -- -- -- -- -- No Pain --    01/09/25 2114 -- -- -- -- 159/101 schedueled metoprolol -- --    01/09/25 2114 98.2 °F (36.8 °C) 83 95 % -- -- -- -- DII   01/09/25 1619 -- 68 97 % -- 144/97 -- -- DII   01/09/25 1427 98.5 °F (36.9 °C) -- -- -- 143/98 -- -- DII   01/09/25 1239 -- -- -- -- -- 6 -- AR   01/09/25 1105 98.1 °F (36.7 °C) 75 98 % -- 140/96 -- -- DII   01/09/25 1048 97.7 °F (36.5 °C) 68 99 % 16 136/96 -- -- DII   01/09/25 0904 -- -- -- -- -- 10 - Worst Possible Pain -- MH   01/09/25 0903 -- -- -- -- -- 10 - Worst Possible Pain -- PA   01/09/25 0800 -- 89 -- -- 151/101 -- -- SS   01/09/25 0727 -- -- -- -- -- 4 -- SS   01/09/25 0727 -- -- -- -- 151/101 I let the nurse know -- -- AK   01/09/25 0727 98.9 °F (37.2 °C) 82 97 % 16 -- -- -- DII   01/09/25 0244 -- 88 99 % 18 136/106 -- -- DII   01/08/25 2106 -- -- -- 16 -- No Pain -- AP   01/08/25 2106 98.4 °F (36.9 °C) 86 96 % -- 134/106 -- -- DII   01/08/25 1900 -- -- -- 17 -- -- -- AP   01/08/25 1846 -- 90 96 % -- 152/132 -- -- DII "   01/08/25 1515 98.3 °F (36.8 °C) 84 97 % 16 158/100 -- -- DII   01/08/25 1203 -- 73 97 % -- 169/99 -- -- DII   01/08/25 1008 -- 73 100 % -- 177/101 -- -- DII   01/08/25 1005 -- -- -- -- 177/101 -- -- PK   01/08/25 1004 -- 68 96 % -- 144/93 -- -- DII   01/08/25 0933 -- 78 98 % -- 180/110 -- -- DII   01/08/25 0825 -- 85 98 % -- 183/106 -- -- DII   01/08/25 0824 -- 85 -- -- 183/106 -- -- PK   01/08/25 0800 -- -- -- -- -- 2 -- PK   01/08/25 0749 -- -- -- 18 -- -- -- PK   01/08/25 0749 98.1 °F (36.7 °C) 85 99 % -- 196/134 -- -- DII   01/08/25 0726 -- 83 97 % 18 186/96 -- -- MH   01/08/25 0600 -- 78 97 % 20 189/110 -- -- RG   01/08/25 0400 -- 80 99 % 20 178/84 -- -- RG   01/08/25 0300 -- 82 99 % 20 202/127 -- -- RG   01/08/25 0200 -- 78 98 % 14 187/111 -- -- KS   01/08/25 0100 -- 80 98 % 20 173/93 -- -- RG   01/08/25 0000 -- 80 98 % 20 188/113 -- -- RG   01/07/25 2300 -- 80 99 % 16 189/97 -- -- ED   01/07/25 2200 -- -- -- 20 -- -- -- RG   01/07/25 2200 -- 80 98 % -- 189/97 -- -- ED   01/07/25 2145 -- 80 99 % 16 194/112 MD made aware -- -- ED   01/07/25 2100 -- 78 96 % 16 176/113 -- -- ED   01/07/25 2030 -- -- -- 16 163/97 -- -- ED   01/07/25 2000 -- 82 99 % 16 177/102 2 -- ED   01/07/25 1951 -- 82 98 % 16 163/102 4 -- [place]   01/07/25 1930 -- 84 -- 16 176/97 -- -- ED   01/07/25 1929 -- 93 98 % 20 204/91 -- -- RG   01/07/25 1900 -- 86 96 % 13 191/91 -- -- ED   01/07/25 1800 -- 86 97 % 18 186/113 6 -- ED   01/07/25 1700 -- 84 99 % 18 188/104 -- -- ED   01/07/25 1645 -- 96 98 % 32 182/99 -- -- SM   01/07/25 1630 -- 88 97 % 15 207/109 -- -- ED   01/07/25 1600 -- 90 99 % 18 221/101 -- -- ED   01/07/25 1530 -- 88 98 % 13 216/109 -- -- ED   01/07/25 1515 -- 92 98 % 15 207/120 -- -- ED   01/07/25 1500 -- 86 99 % 14 189/121 -- -- MV   01/07/25 1430 -- 84 98 % 12 192/121 -- -- ED   01/07/25 1415 -- 96 98 % 20 204/123 -- -- MV   01/07/25 1400 -- 84 98 % 11 -- -- -- MV   01/07/25 1332 98 °F (36.7 °C) -- -- -- -- -- -- MV   01/07/25  "1315 -- 96 100 % 22 218/115 -- -- ED   01/07/25 1305 -- 110 98 % 18 231/124 -- -- ED            Physical Exam  Vitals and nursing note reviewed.   Constitutional:       General: He is not in acute distress.     Appearance: He is well-developed.   HENT:      Head: Normocephalic and atraumatic.   Eyes:      Conjunctiva/sclera: Conjunctivae normal.   Cardiovascular:      Rate and Rhythm: Normal rate and regular rhythm.      Heart sounds: No murmur heard.  Pulmonary:      Effort: Pulmonary effort is normal. No respiratory distress.      Breath sounds: Normal breath sounds.   Abdominal:      Palpations: Abdomen is soft.      Tenderness: There is no abdominal tenderness.   Musculoskeletal:         General: No swelling.      Cervical back: Neck supple.   Skin:     General: Skin is warm and dry.      Capillary Refill: Capillary refill takes less than 2 seconds.   Neurological:      Mental Status: He is alert and oriented to person, place, and time.      Cranial Nerves: No cranial nerve deficit.      Sensory: No sensory deficit.      Motor: No weakness.      Coordination: Coordination normal.      Gait: Gait normal.      Deep Tendon Reflexes: Reflexes normal.      Comments: Subjectively decreased sensation left arm.  3/5 strength left arm.  No other focal neurological deficits.   Psychiatric:         Mood and Affect: Mood normal.         Results Reviewed       Procedure Component Value Units Date/Time    T4, free [[id number]]  (Abnormal) Collected: 01/08/25 0458    Lab Status: Final result Specimen: Blood from Arm, Left Updated: 01/08/25 0618     Free T4 0.52 ng/dL     Narrative:        \"Therapeutic range for patients medicated with thyroid disorders: 0.61-1.24 ng/dL.\"    Protime-INR [[id number]]  (Normal) Collected: 01/08/25 0458    Lab Status: Final result Specimen: Blood from Arm, Left Updated: 01/08/25 0549     Protime 13.6 seconds      INR 1.01    Narrative:      INR Therapeutic Range    Indication                         "                     INR Range      Atrial Fibrillation                                               2.0-3.0  Hypercoagulable State                                    2.0.2.3  Left Ventricular Asist Device                            2.0-3.0  Mechanical Heart Valve                                  -    Aortic(with afib, MI, embolism, HF, LA enlargement,    and/or coagulopathy)                                     2.0-3.0 (2.5-3.5)     Mitral                                                             2.5-3.5  Prosthetic/Bioprosthetic Heart Valve               2.0-3.0  Venous thromboembolism (VTE: VT, PE        2.0-3.0    APTT [[id number]]  (Normal) Collected: 01/08/25 0458    Lab Status: Final result Specimen: Blood from Arm, Left Updated: 01/08/25 0549     PTT 28 seconds     TSH, 3rd generation with Free T4 reflex [[id number]]  (Abnormal) Collected: 01/08/25 0458    Lab Status: Final result Specimen: Blood from Arm, Left Updated: 01/08/25 0544     TSH 3RD GENERATON 10.922 uIU/mL     Comprehensive metabolic panel [[id number]]  (Abnormal) Collected: 01/08/25 0458    Lab Status: Final result Specimen: Blood from Arm, Left Updated: 01/08/25 0531     Sodium 136 mmol/L      Potassium 4.2 mmol/L      Chloride 99 mmol/L      CO2 26 mmol/L      ANION GAP 11 mmol/L      BUN 16 mg/dL      Creatinine 0.88 mg/dL      Glucose 98 mg/dL      Calcium 8.8 mg/dL      AST 25 U/L      ALT 17 U/L      Alkaline Phosphatase 52 U/L      Total Protein 6.6 g/dL      Albumin 3.9 g/dL      Total Bilirubin 1.24 mg/dL      eGFR 88 ml/min/1.73sq m     Narrative:      National Kidney Disease Foundation guidelines for Chronic Kidney Disease (CKD):     Stage 1 with normal or high GFR (GFR > 90 mL/min/1.73 square meters)    Stage 2 Mild CKD (GFR = 60-89 mL/min/1.73 square meters)    Stage 3A Moderate CKD (GFR = 45-59 mL/min/1.73 square meters)    Stage 3B Moderate CKD (GFR = 30-44 mL/min/1.73 square meters)    Stage 4 Severe CKD (GFR = 15-29 mL/min/1.73  square meters)    Stage 5 End Stage CKD (GFR <15 mL/min/1.73 square meters)  Note: GFR calculation is accurate only with a steady state creatinine    Magnesium [[id number]]  (Normal) Collected: 01/08/25 0458    Lab Status: Final result Specimen: Blood from Arm, Left Updated: 01/08/25 0531     Magnesium 1.9 mg/dL     Hemoglobin A1c w/EAG Estimation [[id number]] Collected: 01/08/25 0458    Lab Status: Final result Specimen: Blood from Arm, Left Updated: 01/08/25 0512     Hemoglobin A1C 5.3 %       mg/dl     CBC and differential [[id number]]  (Abnormal) Collected: 01/08/25 0458    Lab Status: Final result Specimen: Blood from Arm, Left Updated: 01/08/25 0509     WBC 10.57 Thousand/uL      RBC 4.57 Million/uL      Hemoglobin 16.1 g/dL      Hematocrit 44.8 %      MCV 98 fL      MCH 35.2 pg      MCHC 35.9 g/dL      RDW 12.7 %      MPV 9.8 fL      Platelets 185 Thousands/uL      nRBC 0 /100 WBCs      Segmented % 75 %      Immature Grans % 0 %      Lymphocytes % 13 %      Monocytes % 9 %      Eosinophils Relative 2 %      Basophils Relative 1 %      Absolute Neutrophils 7.94 Thousands/µL      Absolute Immature Grans 0.04 Thousand/uL      Absolute Lymphocytes 1.42 Thousands/µL      Absolute Monocytes 0.93 Thousand/µL      Eosinophils Absolute 0.19 Thousand/µL      Basophils Absolute 0.05 Thousands/µL     Lipid Panel with Direct LDL reflex [[id number]]  (Normal) Collected: 01/07/25 1830    Lab Status: Final result Specimen: Blood from Arm, Left Updated: 01/07/25 2335     Cholesterol 116 mg/dL      Triglycerides 74 mg/dL      HDL, Direct 47 mg/dL      LDL Calculated 54 mg/dL     HS Troponin I 4hr [[id number]]  (Normal) Collected: 01/07/25 1830    Lab Status: Final result Specimen: Blood from Arm, Left Updated: 01/07/25 1902     hs TnI 4hr 17 ng/L      Delta 4hr hsTnI 4 ng/L     HS Troponin I 2hr [[id number]]  (Normal) Collected: 01/07/25 1520    Lab Status: Final result Specimen: Blood from Arm, Left Updated: 01/07/25 1613      hs TnI 2hr 11 ng/L      Delta 2hr hsTnI -2 ng/L     Ethanol [[id number]]  (Normal) Collected: 01/07/25 1335    Lab Status: Final result Specimen: Blood from Arm, Left Updated: 01/07/25 1409     Ethanol Lvl <10 mg/dL     HS Troponin 0hr (reflex protocol) [[id number]]  (Normal) Collected: 01/07/25 1318    Lab Status: Final result Specimen: Blood from Arm, Right Updated: 01/07/25 1357     hs TnI 0hr 13 ng/L     Comprehensive metabolic panel [[id number]] Collected: 01/07/25 1318    Lab Status: Final result Specimen: Blood from Arm, Right Updated: 01/07/25 1350     Sodium 137 mmol/L      Potassium 4.1 mmol/L      Chloride 98 mmol/L      CO2 29 mmol/L      ANION GAP 10 mmol/L      BUN 15 mg/dL      Creatinine 1.02 mg/dL      Glucose 89 mg/dL      Calcium 9.2 mg/dL      AST 23 U/L      ALT 20 U/L      Alkaline Phosphatase 57 U/L      Total Protein 7.1 g/dL      Albumin 4.0 g/dL      Total Bilirubin 0.78 mg/dL      eGFR 75 ml/min/1.73sq m     Narrative:      National Kidney Disease Foundation guidelines for Chronic Kidney Disease (CKD):     Stage 1 with normal or high GFR (GFR > 90 mL/min/1.73 square meters)    Stage 2 Mild CKD (GFR = 60-89 mL/min/1.73 square meters)    Stage 3A Moderate CKD (GFR = 45-59 mL/min/1.73 square meters)    Stage 3B Moderate CKD (GFR = 30-44 mL/min/1.73 square meters)    Stage 4 Severe CKD (GFR = 15-29 mL/min/1.73 square meters)    Stage 5 End Stage CKD (GFR <15 mL/min/1.73 square meters)  Note: GFR calculation is accurate only with a steady state creatinine    Protime-INR [[id number]]  (Normal) Collected: 01/07/25 1318    Lab Status: Final result Specimen: Blood from Arm, Right Updated: 01/07/25 1345     Protime 12.8 seconds      INR 0.93    Narrative:      INR Therapeutic Range    Indication                                             INR Range      Atrial Fibrillation                                               2.0-3.0  Hypercoagulable State                                    2.0.2.3  Left  Ventricular Asist Device                            2.0-3.0  Mechanical Heart Valve                                  -    Aortic(with afib, MI, embolism, HF, LA enlargement,    and/or coagulopathy)                                     2.0-3.0 (2.5-3.5)     Mitral                                                             2.5-3.5  Prosthetic/Bioprosthetic Heart Valve               2.0-3.0  Venous thromboembolism (VTE: VT, PE        2.0-3.0    APTT [[id number]]  (Normal) Collected: 01/07/25 1318    Lab Status: Final result Specimen: Blood from Arm, Right Updated: 01/07/25 1345     PTT 29 seconds     CBC and differential [[id number]]  (Abnormal) Collected: 01/07/25 1318    Lab Status: Final result Specimen: Blood from Arm, Right Updated: 01/07/25 1326     WBC 6.55 Thousand/uL      RBC 4.94 Million/uL      Hemoglobin 17.2 g/dL      Hematocrit 49.1 %      MCV 99 fL      MCH 34.8 pg      MCHC 35.0 g/dL      RDW 12.7 %      MPV 10.0 fL      Platelets 193 Thousands/uL      nRBC 0 /100 WBCs      Segmented % 60 %      Immature Grans % 0 %      Lymphocytes % 24 %      Monocytes % 12 %      Eosinophils Relative 3 %      Basophils Relative 1 %      Absolute Neutrophils 3.94 Thousands/µL      Absolute Immature Grans 0.02 Thousand/uL      Absolute Lymphocytes 1.59 Thousands/µL      Absolute Monocytes 0.77 Thousand/µL      Eosinophils Absolute 0.18 Thousand/µL      Basophils Absolute 0.05 Thousands/µL     Fingerstick Glucose (POCT) [[id number]]  (Normal) Collected: 01/07/25 1305    Lab Status: Final result Specimen: Blood Updated: 01/07/25 1305     POC Glucose 94 mg/dl             XR shoulder 2+ vw left   Final Interpretation by Byron Menard MD (01/08 1345)      Acute nondisplaced fracture of the greater tuberosity of the humeral head      The study was marked in EPIC for immediate notification.         Computerized Assisted Algorithm (CAA) may have been used to analyze all applicable images.         Workstation performed:  [id number]         MRI brain wo contrast   Final Interpretation by Rocky James MD (01/08 1448)      1. Recent infarctions in the right thalamus, right occipital lobe, and right temporal lobe, correlating to some of the findings on yesterday's head CT. These discontiguous regions of acute/recent infarction are subtended by the right posterior cerebral    artery, correlating to the P2 segment of the right posterior cerebral artery occlusion seen on yesterday's CTA. There is no acute hemorrhagic conversion.   2. Mild chronic microangiopathic changes.   3. Stable appearance of old right cerebellar infarct.      Resident: Kel Dean I, the attending radiologist, have reviewed the images and agree with the final report above.      Workstation performed: [id number]         CTA head and neck with and without contrast   ED Interpretation by Heber Gore MD (01/07 3112)   Encephalomalacia of the right cerebellum from previous hemorrhagic stroke in 2020.      Final Interpretation by E. Alec Schoenberger, MD (01/07 9400)      CT Brain: New small infarct in the right occipital lobe that could be acute. No acute hemorrhage.      CT Angiography: Proximal occlusion of the P2 segment of the right PCA.   Stable 3 mm ACOM aneurysm. Moderate stenosis cervical left ICA progressed from prior study.   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      I personally discussed this study with RAMIN BLANTON on 1/7/2025 6:56 PM.            Workstation performed: [id number]             Procedures    ED Medication and Procedure Management   Prior to Admission Medications   Prescriptions Last Dose Informant Patient Reported? Taking?   acetaminophen (TYLENOL) 325 mg tablet Not Taking  No No   Sig: Take 2 tablets (650 mg total) by mouth every 6 (six) hours as needed for mild pain or headaches   Patient not taking: Reported on 1/8/2025   aspirin 81 mg chewable tablet Not Taking  No  No   Sig: Chew 1 tablet (81 mg total) daily   Patient not taking: Reported on 1/8/2025   atorvastatin (LIPITOR) 20 mg tablet Not Taking  No No   Sig: Take 1 tablet (20 mg total) by mouth daily with dinner   Patient not taking: Reported on 1/8/2025   levothyroxine 75 mcg tablet Not Taking  No No   Sig: Take 1 tablet (75 mcg total) by mouth daily in the early morning   Patient not taking: Reported on 1/8/2025   lisinopril (ZESTRIL) 10 mg tablet Not Taking  No No   Sig: Take 1 tablet (10 mg total) by mouth daily   Patient not taking: Reported on 1/8/2025   metoprolol tartrate (LOPRESSOR) 25 mg tablet Not Taking  No No   Sig: Take 1 tablet (25 mg total) by mouth every 12 (twelve) hours   Patient not taking: Reported on 1/8/2025      Facility-Administered Medications: None     Current Discharge Medication List        CONTINUE these medications which have NOT CHANGED    Details   acetaminophen (TYLENOL) 325 mg tablet Take 2 tablets (650 mg total) by mouth every 6 (six) hours as needed for mild pain or headaches  Qty: 30 tablet, Refills: 0    Associated Diagnoses: Cerebellar stroke (HCC)      aspirin 81 mg chewable tablet Chew 1 tablet (81 mg total) daily  Qty: 30 tablet, Refills: 0    Associated Diagnoses: Cerebellar stroke (HCC)      atorvastatin (LIPITOR) 20 mg tablet Take 1 tablet (20 mg total) by mouth daily with dinner  Qty: 30 tablet, Refills: 0    Associated Diagnoses: Cerebellar stroke (HCC)      levothyroxine 75 mcg tablet Take 1 tablet (75 mcg total) by mouth daily in the early morning  Qty: 30 tablet, Refills: 0    Associated Diagnoses: Hypothyroidism, unspecified type      lisinopril (ZESTRIL) 10 mg tablet Take 1 tablet (10 mg total) by mouth daily  Qty: 30 tablet, Refills: 0    Associated Diagnoses: Essential hypertension      metoprolol tartrate (LOPRESSOR) 25 mg tablet Take 1 tablet (25 mg total) by mouth every 12 (twelve) hours  Qty: 60 tablet, Refills: 0    Associated Diagnoses: Essential hypertension            No discharge procedures on file.  ED SEPSIS DOCUMENTATION   Time reflects when diagnosis was documented in both MDM as applicable and the Disposition within this note       Time User Action Codes Description Comment    1/7/2025  7:00 PM Joey Preston Add [I63.531] Acute ischemic right PCA stroke (HCC)     1/7/2025 10:23 PM Dylan Riley Add [I63.9] Cerebellar stroke     1/8/2025  9:53 AM Nimako-Feli Phelps Add [I48.11] Longstanding persistent atrial fibrillation (HCC)     1/8/2025  9:53 AM Nimako-Feli, Phelps Add [I63.9] Cerebrovascular accident (CVA), unspecified mechanism (HCC)     1/8/2025 12:52 PM Nimako-Feli Phelps Add [M25.512] Acute pain of left shoulder     1/9/2025  8:34 AM Leilani Gutierrez Add [F10.20] Alcohol dependence (HCC)                  Joey Preston DO  01/14/25 0759

## 2025-01-14 NOTE — ASSESSMENT & PLAN NOTE
Rate controlled  Continue metoprolol  Patient was not taking any medication for the past 6 months or so. Has not seen a PCP since 2022  Restart Eliquis 1/10/25  Outpatient follow up with cardiology

## 2025-01-14 NOTE — ASSESSMENT & PLAN NOTE
Patient with hypertensive emergency at the time of presentation.  Restarted back on metoprolol and lisinopril.    Monitor blood pressure  Added amlodipine. -Will split the amlodipine dose due to elevated blood pressure in the morning  Lisinopril was held due to acute kidney injury restarted back once the creatinine is down.  Blood pressure better controlled.  Further management can be done in the rehab

## 2025-01-14 NOTE — ASSESSMENT & PLAN NOTE
"CTA head & neck w/ & w/o contrast: \"CT Brain: New small infarct in the right occipital lobe that could be acute. No acute hemorrhage. CT Angiography: Proximal occlusion of the P2 segment of the right PCA. Stable 3 mm ACOM aneurysm. Moderate stenosis cervical left ICA progressed from prior study. 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  Follow-up with neurology  "

## 2025-01-14 NOTE — DISCHARGE SUMMARY
Discharge Summary - Hospitalist   Name: Jose Ramon Quiñonez Jr. 69 y.o. male I MRN: [id number]  Unit/Bed#: [facility] 723-01 I Date of Admission: 1/7/2025   Date of Service: 1/14/2025 I Hospital Day: 7     Assessment & Plan  CVA (cerebral vascular accident) (HCC)  Presented with visual disturbance, LUE numbness/weakness, dysarthria, gait disturbance that started 4 days prior to presentation.  Multiple stroke risk factors including afib not on AC, untreated HTN due to noncompliance with meds, prior history of embolic stroke R cerebellar  CTH:New small infarct in the right occipital lobe that could be acute. No acute hemorrhage.   CTA (1/7): Proximal occlusion of the P2 segment of the right PCA. Stable 3 mm ACOM aneurysm. Moderate stenosis cervical left ICA progressed from prior study. 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   MRI brain (1/8): ecent infractions in the right thalamus, and right occipital lobe, right temporal lobe.  This is discontinued because regions of acute/recent infractions are subtended by the right posterior cerebral artery correlating to the P2 segment of the right posterior cerebral artery occlusion seen on yesterday's CTA.  No acute hemorrhagic conversion  Echo 1/8: EF 65%, wall motion normal, no significant valvular abnormalities.    Appreciate neurology recommendations.  Etiology felt to be cardioembolic versus atheroembolic in the setting of noncompliance with AC/AP regimen.  Restarted on aspirin 81 mg daily, Eliquis 5 mg twice daily, atorvastatin 20 mg daily, and blood pressure regimen..  New prescriptions sent for all medications on discharge and patient advised to follow-up with his PCP for ongoing management.  He stated that he already has a follow-up scheduled.  Overall patient markedly improved prior to discharge.  Able to return home safely with outpatient prescription for PT/OT.  Outpatient follow-up with neurology in 6 weeks  Longstanding  "persistent atrial fibrillation (HCC)  Patient was not taking any medication for the past 6 months or so. Has not seen a PCP since 2022  Restarted on metoprolol 25 mg twice daily with adequate rate control and restarted on Eliquis 5 mg twice daily for stroke prophylaxis.  New prescription sent for both these medications as well.  Patient advised to follow-up with PCP within 1 to 2 weeks of discharge.  Outpatient referral to cardiology also sent on discharge.  HTN (hypertension)  Hypertensive emergency on admission due to noncompliance.  Prescriptions for new medications and on discharge  Discharge regimen includes:  Lisinopril 20 mg daily  Toprol tartrate 25 mg twice daily  Amlodipine 2.5 mg twice daily  Closed nondisplaced fracture of greater tuberosity of left humerus  Fall in the emergency room  Ortho evaluation appreciated.  Left upper extremity nonweightbearing and in a sling.  Patient is noncompliant with sling and also weightbearing instructions  Outpatient follow-up with orthopedic surgery in 1 week.  Contact info provided on discharge.  Hypothyroid  Continue levothyroxine-repeat thyroid studies in 4 to 6 weeks as outpatient  RAUDEL (obstructive sleep apnea)  CPAP HS  Aneurysm (HCC)  CTA head & neck w/ & w/o contrast: \"CT Brain: New small infarct in the right occipital lobe that could be acute. No acute hemorrhage. CT Angiography: Proximal occlusion of the P2 segment of the right PCA. Stable 3 mm ACOM aneurysm. Moderate stenosis cervical left ICA progressed from prior study. 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  Follow-up with neurology  Unwitnessed fall  Patient had an unwitnessed fall in the emergency room. Patient with left shoulder pain.  X-ray reviewed. Noted to have acute nondisplaced fracture of the greater tuberosity of the humeral head  Ortho recommendations   Nonweightbearing " left upper extremity.   Okay for range of motion and activity to tolerance.   Plan for conservative management with sling and repeat x-ray in 2 weeks   Plan for outpatient follow-up with orthopedic  Hyperlipidemia  Continue statin on discharge  Personal history of noncompliance with medical treatment and regimen  Pt reports noncompliance with medications for the last several months.  Patient reports PCP appointment arranged prior to discharge.  Nicotine dependence with current use  Advised cessation  Alcohol dependence (HCC)  Family reported that patient drinks at least a case of beer a day.  Patient only endorses 5-6 beers a day. Was given high-dose thiamine  CIWA protocol in place - Has not required any Ativan.   Continue thiamine and folate supplementation on discharge  Status post total shoulder arthroplasty, left  Per pt - had a hx of L TSA but had excellent recovery.  Patient reports falling in ED during this admission, resulting in new pain and decreased ROM  Orthopedic evaluation appreciated     Medical Problems       Resolved Problems  Date Reviewed: 1/14/2025   None       Discharging Physician / Practitioner: Denzel Beltran DO  PCP: Margarito Grant MD  Admission Date:   Admission Orders (From admission, onward)       Ordered        01/07/25 2215  INPATIENT ADMISSION  Once                          Discharge Date: 01/14/25    Consultations During Hospital Stay:  Orthopedic surgery  Neurology  Cardiology    Procedures Performed:   none    Significant Findings / Test Results:   X-ray left shoulder 1/8: Acute nondisplaced fracture of the greater tuberosity of the humeral head.  MRI brain 1/8: Recent infarctions in the right thalamus, right occipital lobe, right temporal lobe.  These discontiguous regions of acute/recent infarction are subtended by the right posterior cerebral artery, correlating to the P2 segment of the right posterior cerebral artery occlusion seen on yesterday's CTA.  No hemorrhagic  conversion.  CTA 1/7: Proximal occlusion of the P2 segment of the right PCA.  Stable 3 mm ACOM aneurysm.  Moderate stenosis cervical left ICA progressed from prior study.  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    Incidental Findings:   none    Test Results Pending at Discharge (will require follow up):   none     Outpatient Tests Requested:  none    Complications:  none    Reason for Admission: CVA    Hospital Course:   Jose Ramon Quiñonez Jr. is a 69 y.o. male patient who originally presented to the hospital on 1/7/2025 due to several days of intermittent neurologic complaints.  On admission he complained of blurry vision, left upper extremity numbness and tingling with weakness, dysarthria, gait disturbance.  Initial CT imaging was consistent with a new infarction in the right occipital lobe, and initial CTA showed new stenotic changes.  For these reasons he was admitted to the hospital service for evaluation and management.  Neurology was consulted on admission and he was admitted under the stroke protocol.  Further workup with MRI of the brain revealed multifocal recent infarctions in the right thalamus, right occipital lobe, right temporal lobe.  Neurology felt that the patient's findings were secondary to cardioembolic etiology versus atheroembolic etiology.  This was in the setting of noncompliance with his home prior antihypertensive and anticoagulation regimen.  He had not taken any of his meds for the last 6 months.  Patient was restarted on his antihypertensive regimen prior to discharge.  He was also restarted on systemic anticoagulation with Eliquis.  Aspirin and statin were added as well.  By the time of discharge patient's overall symptomatology had markedly improved.  He has been cleared for discharge home with home PT/OT.  Only residual symptoms appear to be left upper extremity weakness.  Prescriptions were provided all medications on discharge.  He was  "advised to follow-up with his PCP within 1 to 2 weeks of discharge.  Outpatient referral to cardiology provided and prescriptions for PT OT provided.    Patient hospitalization was also complicated by of fall in the emergency room.  He subsequently suffered a nondisplaced fracture of the greater tuberosity of the left humeral head.  He was seen by orthopedic surgery who felt that no intervention was warranted.  He is weightbearing as tolerated to the arm with sling for comfort.  He will need to follow-up with orthopedic surgery in 1 to 2 weeks after discharge.    Otherwise discharge plan was discussed at length with the patient at bedside.  All questions and concerns were addressed.  He was eager to return home.  Patient was asked if discharge plan should be discussed with any other family members, he declined.    Please see above list of diagnoses and related plan for additional information.     Condition at Discharge: good    Discharge Day Visit / Exam:   Subjective: Patient seen and examined on the day of discharge.  Feeling well today.  No new complaints overnight.  No new neurologic issues.  He is ambulating independently and well.  Eager to return home.  Vitals: Blood Pressure: 136/89 (01/14/25 0850)  Pulse: 71 (01/14/25 0714)  Temperature: 97.8 °F (36.6 °C) (01/14/25 0714)  Temp Source: Oral (01/13/25 2043)  Respirations: 14 (01/14/25 0714)  Height: 5' 8\" (172.7 cm) (01/08/25 0933)  Weight - Scale: 77.1 kg (170 lb) (01/08/25 0933)  SpO2: 97 % (01/14/25 0714)  PHYSICAL EXAM:    Vitals signs reviewed  Constitutional   Awake and cooperative. NAD.   Head/Neck   Normocephalic. Atraumatic.   HEENT   No scleral icterus. EOMI.   Heart   Regular rate and rhythm. No murmers.   Lungs   Clear to auscultation bilaterally. Respirations unlaboured.   Abdomen   Soft. Nontender. Nondistended.    Skin   Skin color normal. No rashes.   Extremities   No deformities. No peripheral edema.   Neuro   Alert and oriented.  4-5 " strength in the left upper extremity otherwise intact throughout.  Cranial nerves grossly intact.  No sensation deficits.  Speech clear and coherent.  No facial asymmetry.   Psych   Mood stable. Affect normal.         Discussion with Family: Patient declined call to .     Discharge instructions/Information to patient and family:   See after visit summary for information provided to patient and family.      Provisions for Follow-Up Care:  See after visit summary for information related to follow-up care and any pertinent home health orders.      Mobility at time of Discharge:   Basic Mobility Inpatient Raw Score: 20  JH-HLM Goal: 6: Walk 10 steps or more  JH-HLM Achieved: 8: Walk 250 feet ot more  HLM Goal achieved. Continue to encourage appropriate mobility.     Disposition:   Home    Planned Readmission: no    Discharge Medications:  See after visit summary for reconciled discharge medications provided to patient and/or family.      Administrative Statements   Discharge Statement:  I have spent a total time of 50 minutes in caring for this patient on the day of the visit/encounter. >30 minutes of time was spent on: Diagnostic results, Prognosis, Risks and benefits of tx options, Instructions for management, Patient and family education, Importance of tx compliance, Risk factor reductions, Impressions, Counseling / Coordination of care, Documenting in the medical record, Reviewing / ordering tests, medicine, procedures  , and Communicating with other healthcare professionals .    **Please Note: This note may have been constructed using a voice recognition system**

## 2025-01-14 NOTE — PLAN OF CARE
Problem: Potential for Falls  Goal: Patient will remain free of falls  Description: INTERVENTIONS:  - Educate patient/family on patient safety including physical limitations  - Instruct patient to call for assistance with activity   - Consult OT/PT to assist with strengthening/mobility   - Keep Call bell within reach  - Keep bed low and locked with side rails adjusted as appropriate  - Keep care items and personal belongings within reach  - Initiate and maintain comfort rounds  - Make Fall Risk Sign visible to staff  - Offer Toileting every 2 Hours, in advance of need  - Initiate/Maintain bed alarm  - Obtain necessary fall risk management equipment: non skid socks  - Apply yellow socks and bracelet for high fall risk patients  - Consider moving patient to room near nurses station  Outcome: Progressing     Problem: Neurological Deficit  Goal: Neurological status is stable or improving  Description: Interventions:  - Monitor and assess patient's level of consciousness, motor function, sensory function, and level of assistance needed for ADLs.   - Monitor and report changes from baseline. Collaborate with interdisciplinary team to initiate plan and implement interventions as ordered.   - Provide and maintain a safe environment.  - Consider seizure precautions.  - Consider fall precautions.  - Consider aspiration precautions.  - Consider bleeding precautions.  Outcome: Progressing     Problem: Activity Intolerance/Impaired Mobility  Goal: Mobility/activity is maintained at optimum level for patient  Description: Interventions:  - Assess and monitor patient  barriers to mobility and need for assistive/adaptive devices.  - Assess patient's emotional response to limitations.  - Collaborate with interdisciplinary team and initiate plans and interventions as ordered.  - Encourage independent activity per ability.  - Maintain proper body alignment.  - Perform active/passive rom as tolerated/ordered.  - Plan activities to  conserve energy.  - Turn patient as appropriate  Outcome: Progressing     Problem: Potential for Aspiration  Goal: Non-ventilated patient's risk of aspiration is minimized  Description: Assess and monitor vital signs, respiratory status, and labs (WBC).  Monitor for signs of aspiration (tachypnea, cough, rales, wheezing, cyanosis, fever).    - Assess and monitor patient's ability to swallow.  - Place patient up in chair to eat if possible.  - HOB up at 90 degrees to eat if unable to get patient up into chair.  - Supervise patient during oral intake.   - Instruct patient/ family to take small bites.  - Instruct patient/ family to take small single sips when taking liquids.  - Follow patient-specific strategies generated by speech pathologist.  Outcome: Progressing     Problem: Nutrition  Goal: Nutrition/Hydration status is improving  Description: Monitor and assess patient's nutrition/hydration status for malnutrition (ex- brittle hair, bruises, dry skin, pale skin and conjunctiva, muscle wasting, smooth red tongue, and disorientation). Collaborate with interdisciplinary team and initiate plan and interventions as ordered.  Monitor patient's weight and dietary intake as ordered or per policy. Utilize nutrition screening tool and intervene per policy. Determine patient's food preferences and provide high-protein, high-caloric foods as appropriate.     - Assist patient with eating.  - Allow adequate time for meals.  - Encourage patient to take dietary supplement as ordered.  - Collaborate with clinical nutritionist.  - Include patient/family/caregiver in decisions related to nutrition.  Outcome: Progressing

## 2025-01-14 NOTE — CASE MANAGEMENT
Case Management Discharge Planning Note    Patient name Jose Ramon Quiñonez Jr.  Location [facility] 723/[facility] 723-01 MRN [id number]  : 1955 Date 2025       Current Admission Date: 2025  Current Admission Diagnosis:CVA (cerebral vascular accident) (HCC)   Patient Active Problem List    Diagnosis Date Noted Date Diagnosed    RAUDEL (obstructive sleep apnea) 2025     Unwitnessed fall 2025     Hyperlipidemia 2025     Leukocytosis 2025     Status post total shoulder arthroplasty, left 2025     Personal history of noncompliance with medical treatment and regimen 2025     Nicotine dependence with current use 2025     Alcohol dependence (HCC) 2025     Psychosocial stressors 2025     Closed nondisplaced fracture of greater tuberosity of left humerus 2025     Hypothyroid 2020     Aneurysm (HCC) 2020     Longstanding persistent atrial fibrillation (HCC) 2020     HTN (hypertension) 2020     CVA (cerebral vascular accident) (HCC) 2020       LOS (days): 7  Geometric Mean LOS (GMLOS) (days): 2.8  Days to GMLOS:-3.7     OBJECTIVE:  Risk of Unplanned Readmission Score: 9.55         Current admission status: Inpatient   Preferred Pharmacy:   [id number] - [place] - [address]  [address]  [place] [address]  Phone: [phone] Fax: [phone]    [facility] - [place] - [address]  [address]  [place] [address]  Phone: [phone] Fax: [phone]    [id number] - [place] - [address]   [place] [address]  Phone: [phone] Fax: [phone]    Primary Care Provider: Margarito Grant MD    Primary Insurance: MEDICARE  Secondary Insurance:     DISCHARGE DETAILS:       Freedom of Choice: Yes  Comments - Freedom of Choice: Reccs changed pt home with OP  CM contacted family/caregiver?: Yes  Were Treatment Team discharge  recommendations reviewed with patient/caregiver?: Yes  Did patient/caregiver verbalize understanding of patient care needs?: Yes  Were patient/caregiver advised of the risks associated with not following Treatment Team discharge recommendations?: Yes    Contacts  Patient Contacts: Pita Sotelo  Relationship to Patient:: Family (daughter)  Contact Method: Phone  Phone Number: [phone]  Reason/Outcome: Emergency Contact, Discharge Planning    Requested Home Health Care         Is the patient interested in HHC at discharge?: No    DME Referral Provided  Referral made for DME?: No    Other Referral/Resources/Interventions Provided:  Interventions: Outpatient PT    Would you like to participate in our [facility] service program?  : No - Declined    Treatment Team Recommendation: Home, Outpatient Rehab  Discharge Destination Plan:: Outpatient Rehab, Home  Transport at Discharge : Family         Additional Comments: Phone call to Alicia, advised pt reccs have changed to home with OP PT. CM explained reason for change, patient is mobile and has no needs other than PT for his arm. Alicia understands changes. CM will discuss ride with pt. Pt provided with OP resource list

## 2025-01-14 NOTE — PROGRESS NOTES
Progress Note - Hospitalist   Name: Jose Ramon Quiñonez Jr. 69 y.o. male I MRN: [id number]  Unit/Bed#: [facility] 723-01 I Date of Admission: 1/7/2025   Date of Service: 1/13/2025 I Hospital Day: 6    Assessment & Plan  CVA (cerebral vascular accident) (HCC)  Presented with visual disturbance, LUE numbness/weakness, dysarthria, gait disturbance that started 4 days ago, & felt dizzy  Multiple stroke risk factors including afib not on AC, untreated HTN due to noncompliance with meds, prior history of embolic stroke R cerebellar  CTH:New small infarct in the right occipital lobe that could be acute. No acute hemorrhage.   CTA: Proximal occlusion of the P2 segment of the right PCA. Stable 3 mm ACOM aneurysm. Moderate stenosis cervical left ICA progressed from prior study. 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   Neurology following  MRI brain results reviewed-recent infractions in the right thalamus, and right occipital lobe, right temporal lobe.  This is discontinued because regions of acute/recent infractions are subtended by the right posterior cerebral artery correlating to the P2 segment of the right posterior cerebral artery occlusion seen on yesterday's CTA.  No acute hemorrhagic conversion  Echo reviewed - ejection fraction 65%.  Systolic function is normal.  ASA, statin  Neuro checks, stroke pathway  Goal normotension SBP<130/80  Started on Eliquis.  Outpatient follow-up with neurology  Outpatient follow-up with cardiology given longstanding persistent atrial fibrillation  Longstanding persistent atrial fibrillation (HCC)  Rate controlled  Continue metoprolol  Patient was not taking any medication for the past 6 months or so. Has not seen a PCP since 2022  Restart Eliquis 1/10/25  Outpatient follow up with cardiology  HTN (hypertension)  Patient with hypertensive emergency at the time of presentation.  Restarted back on metoprolol and lisinopril.    Monitor blood  "pressure  Added amlodipine. -Will split the amlodipine dose due to elevated blood pressure in the morning  Lisinopril was held due to acute kidney injury restarted back once the creatinine is down.  Blood pressure better controlled.  Further management can be done in the rehab  Hypothyroid  Continue levothyroxine-repeat thyroid studies in 4 to 6 weeks as outpatient  RAUDEL (obstructive sleep apnea)  CPAP HS  Aneurysm (HCC)  CTA head & neck w/ & w/o contrast: \"CT Brain: New small infarct in the right occipital lobe that could be acute. No acute hemorrhage. CT Angiography: Proximal occlusion of the P2 segment of the right PCA. Stable 3 mm ACOM aneurysm. Moderate stenosis cervical left ICA progressed from prior study. 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  Follow-up with neurology  Unwitnessed fall  Patient had an unwitnessed fall in the emergency room. Patient with left shoulder pain.  X-ray reviewed. Noted to have acute nondisplaced fracture of the greater tuberosity of the humeral head  Ortho recommendations   Nonweightbearing left upper extremity.   Okay for range of motion and activity to tolerance.   Plan for conservative management with sling and repeat x-ray in 2 weeks   Plan for outpatient follow-up with orthopedic  Hyperlipidemia  Lab Results   Component Value Date    CHOLESTEROL 116 01/07/2025    CHOLESTEROL 120 08/22/2020    TRIG 74 01/07/2025    TRIG 92 08/22/2020    HDL 47 01/07/2025    HDL 54 08/22/2020    LDLCALC 54 01/07/2025    LDLCALC 48 08/22/2020   Continue to monitor  Leukocytosis  Resolved  Personal history of noncompliance with medical treatment and regimen  Pt reports noncompliance with medications for the last several months.  Pt has not seen PCP for medication refills  Plan to set up PCP   Nicotine dependence with current use  Pt reports nicotine dependence   0.5 packs/day " every day cigarette smoker  Nicotine patch ordered  Promote smoke cessation  Alcohol dependence (HCC)  Family reported that patient drinks at least a case of beer a day.  Patient only endorses 5-6 beers a day. Was given high-dose thiamine  CIWA protocol in place - Has not required any Ativan.   Continue IV thiamine 500 mg daily then PO thiamine 100 mg daily  Continue folic acid 1 mg daily  Psychosocial stressors  Pt reports psychosocial stressor of recent divorce.  Pt at risk for depression  Recommend cognitive therapy   At risk for increased confusion/delirium, restlessness, agitation, and fall - continue to monitor   Monitor neuro-exam, wakefulness, mood, cognition, insight into deficits and safety awareness   Overstimulation precautions, frequent re-orientation, re-direction, re-assurance  Optimal mood, pain, and sleep management  Plan for possible ARC/IRF disposition  Closed nondisplaced fracture of greater tuberosity of left humerus  Ortho evaluation appreciated.  Left upper extremity nonweightbearing and in a sling.  Patient is noncompliant with sling and also weightbearing instructions  Pain control  Status post total shoulder arthroplasty, left  Per pt - had a hx of L TSA but had excellent recovery.  Patient reports falling in ED during this admission, resulting in new pain and decreased ROM  Orthopedic evaluation appreciated    VTE Pharmacologic Prophylaxis: VTE Score: 8 Moderate Risk (Score 3-4) - Pharmacological DVT Prophylaxis Ordered: apixaban (Eliquis).    Mobility:   Basic Mobility Inpatient Raw Score: 22  JH-HLM Goal: 7: Walk 25 feet or more  JH-HLM Achieved: 7: Walk 25 feet or more  JH-HLM Goal achieved. Continue to encourage appropriate mobility.    Patient Centered Rounds:    Discussions with Specialists or Other Care Team Provider:     Education and Discussions with Family / Patient: Called daughter and left voicemail  Current Length of Stay: 6 day(s)  Current Patient Status: Inpatient    Certification Statement: The patient will continue to require additional inpatient hospital stay due to pending rehab   Discharge Plan: Anticipate discharge in 24-48 hrs to rehab facility.    Code Status: Level 1 - Full Code    Subjective       Objective :  Temp:  [97.1 °F (36.2 °C)-97.5 °F (36.4 °C)] 97.5 °F (36.4 °C)  HR:  [74-82] 82  BP: (136-149)/() 142/100  Resp:  [16-20] 20  SpO2:  [98 %-99 %] 99 %    Body mass index is 25.85 kg/m².     Input and Output Summary (last 24 hours):     Intake/Output Summary (Last 24 hours) at 1/13/2025 2112  Last data filed at 1/13/2025 1549  Gross per 24 hour   Intake 500 ml   Output 350 ml   Net 150 ml       Physical Exam  Constitutional:       General: He is not in acute distress.     Appearance: He is not ill-appearing.   HENT:      Head: Normocephalic and atraumatic.      Nose: Nose normal.   Eyes:      General: No scleral icterus.  Cardiovascular:      Rate and Rhythm: Normal rate and regular rhythm.      Heart sounds: No murmur heard.  Pulmonary:      Effort: No respiratory distress.   Abdominal:      General: There is no distension.   Musculoskeletal:         General: Tenderness present. Normal range of motion.   Skin:     General: Skin is warm.      Coloration: Skin is not jaundiced.   Neurological:      Mental Status: He is alert and oriented to person, place, and time.      Comments: Left upper extremity weakness       \    Lines/Drains:              Lab Results: I have reviewed the following results:   Results from last 7 days   Lab Units 01/11/25  0537   WBC Thousand/uL 7.35   HEMOGLOBIN g/dL 15.8   HEMATOCRIT % 46.5   PLATELETS Thousands/uL 203   SEGS PCT % 66   LYMPHO PCT % 19   MONO PCT % 11   EOS PCT % 3     Results from last 7 days   Lab Units 01/12/25  0508 01/09/25  0529 01/08/25  0458   SODIUM mmol/L 136   < > 136   POTASSIUM mmol/L 3.7   < > 4.2   CHLORIDE mmol/L 102   < > 99   CO2 mmol/L 26   < > 26   BUN mg/dL 25   < > 16   CREATININE mg/dL 1.13   <  > 0.88   ANION GAP mmol/L 8   < > 11   CALCIUM mg/dL 8.7   < > 8.8   ALBUMIN g/dL  --   --  3.9   TOTAL BILIRUBIN mg/dL  --   --  1.24*   ALK PHOS U/L  --   --  52   ALT U/L  --   --  17   AST U/L  --   --  25   GLUCOSE RANDOM mg/dL 117   < > 98    < > = values in this interval not displayed.     Results from last 7 days   Lab Units 01/11/25  0537   INR  1.10     Results from last 7 days   Lab Units 01/07/25  1305   POC GLUCOSE mg/dl 94     Results from last 7 days   Lab Units 01/08/25  0458   HEMOGLOBIN A1C % 5.3     Results from last 7 days   Lab Units 01/09/25  0529   PROCALCITONIN ng/ml <0.05       Recent Cultures (last 7 days):               Last 24 Hours Medication List:     Current Facility-Administered Medications:     acetaminophen (TYLENOL) tablet 650 mg, Q6H PRN    amLODIPine (NORVASC) tablet 2.5 mg, BID    apixaban (ELIQUIS) tablet 5 mg, Q12H    aspirin chewable tablet 81 mg, Daily    atorvastatin (LIPITOR) tablet 40 mg, Daily With Dinner    folic acid (FOLVITE) tablet 1 mg, Daily    hydrALAZINE (APRESOLINE) tablet 25 mg, Q8H PRN    levothyroxine tablet 75 mcg, Early Morning    lidocaine (LIDODERM) 5 % patch 1 patch, Daily    lisinopril (ZESTRIL) tablet 20 mg, Daily    metoprolol tartrate (LOPRESSOR) tablet 25 mg, Q12H MALGORZATA    multivitamin-minerals (CENTRUM) tablet 1 tablet, Daily    nicotine (NICODERM CQ) 21 mg/24 hr TD 24 hr patch 1 patch, Daily    oxyCODONE (ROXICODONE) split tablet 2.5 mg, Q4H PRN    thiamine tablet 100 mg, Daily    Administrative Statements   Today, Patient Was Seen By: Leilani Gutierrez MD      **Please Note: This note may have been constructed using a voice recognition system.**

## 2025-01-14 NOTE — RESTORATIVE TECHNICIAN NOTE
Restorative Technician Note      Patient Name: Jose Ramon Quiñonez JrNicolas     Note Type: Mobility  Patient Position Upon Consult: Supine  Activity Performed: Ambulated; Dangled; Stood  Assistive Device: Other (Comment) (none, L UE arm sling on)  Education Provided: Yes  Patient Position at End of Consult: Supine; All needs within reach; Bed/Chair alarm activated      Yandel WILLIS, Restorative Technician,

## 2025-01-14 NOTE — PLAN OF CARE
Problem: Potential for Falls  Goal: Patient will remain free of falls  Description: INTERVENTIONS:  - Educate patient/family on patient safety including physical limitations  - Instruct patient to call for assistance with activity   - Consult OT/PT to assist with strengthening/mobility   - Keep Call bell within reach  - Keep bed low and locked with side rails adjusted as appropriate  - Keep care items and personal belongings within reach  - Initiate and maintain comfort rounds  - Make Fall Risk Sign visible to staff  - Offer Toileting every 2 Hours, in advance of need  - Initiate/Maintain bed alarm  - Obtain necessary fall risk management equipment: non skid socks  - Apply yellow socks and bracelet for high fall risk patients  - Consider moving patient to room near nurses station  Outcome: Progressing     Problem: PAIN - ADULT  Goal: Verbalizes/displays adequate comfort level or baseline comfort level  Description: Interventions:  - Encourage patient to monitor pain and request assistance  - Assess pain using appropriate pain scale  - Administer analgesics based on type and severity of pain and evaluate response  - Implement non-pharmacological measures as appropriate and evaluate response  - Consider cultural and social influences on pain and pain management  - Notify physician/advanced practitioner if interventions unsuccessful or patient reports new pain  Outcome: Progressing     Problem: INFECTION - ADULT  Goal: Absence or prevention of progression during hospitalization  Description: INTERVENTIONS:  - Assess and monitor for signs and symptoms of infection  - Monitor lab/diagnostic results  - Monitor all insertion sites, i.e. indwelling lines, tubes, and drains  - Monitor endotracheal if appropriate and nasal secretions for changes in amount and color  - Hopkins appropriate cooling/warming therapies per order  - Administer medications as ordered  - Instruct and encourage patient and family to use good hand  hygiene technique  - Identify and instruct in appropriate isolation precautions for identified infection/condition  Outcome: Progressing  Goal: Absence of fever/infection during neutropenic period  Description: INTERVENTIONS:  - Monitor WBC    Outcome: Progressing     Problem: SAFETY ADULT  Goal: Patient will remain free of falls  Description: INTERVENTIONS:  - Educate patient/family on patient safety including physical limitations  - Instruct patient to call for assistance with activity   - Consult OT/PT to assist with strengthening/mobility   - Keep Call bell within reach  - Keep bed low and locked with side rails adjusted as appropriate  - Keep care items and personal belongings within reach  - Initiate and maintain comfort rounds  - Make Fall Risk Sign visible to staff  - Offer Toileting every 2 Hours, in advance of need  - Initiate/Maintain bed alarm  - Obtain necessary fall risk management equipment: non skid socks  - Apply yellow socks and bracelet for high fall risk patients  - Consider moving patient to room near nurses station  Outcome: Progressing  Goal: Maintain or return to baseline ADL function  Description: INTERVENTIONS:  -  Assess patient's ability to carry out ADLs; assess patient's baseline for ADL function and identify physical deficits which impact ability to perform ADLs (bathing, care of mouth/teeth, toileting, grooming, dressing, etc.)  - Assess/evaluate cause of self-care deficits   - Assess range of motion  - Assess patient's mobility; develop plan if impaired  - Assess patient's need for assistive devices and provide as appropriate  - Encourage maximum independence but intervene and supervise when necessary  - Involve family in performance of ADLs  - Assess for home care needs following discharge   - Consider OT consult to assist with ADL evaluation and planning for discharge  - Provide patient education as appropriate  Outcome: Progressing  Goal: Maintains/Returns to pre admission  functional level  Description: INTERVENTIONS:  - Perform AM-PAC 6 Click Basic Mobility/ Daily Activity assessment daily.  - Set and communicate daily mobility goal to care team and patient/family/caregiver.   - Collaborate with rehabilitation services on mobility goals if consulted  - Perform Range of Motion 3 times a day.  - Reposition patient every 2 hours.  - Dangle patient 3 times a day  - Stand patient 3 times a day  - Ambulate patient 3 times a day  - Out of bed to chair 3 times a day   - Out of bed for meals 3 times a day  - Out of bed for toileting  - Record patient progress and toleration of activity level   Outcome: Progressing     Problem: DISCHARGE PLANNING  Goal: Discharge to home or other facility with appropriate resources  Description: INTERVENTIONS:  - Identify barriers to discharge w/patient and caregiver  - Arrange for needed discharge resources and transportation as appropriate  - Identify discharge learning needs (meds, wound care, etc.)  - Arrange for interpretive services to assist at discharge as needed  - Refer to Case Management Department for coordinating discharge planning if the patient needs post-hospital services based on physician/advanced practitioner order or complex needs related to functional status, cognitive ability, or social support system  Outcome: Progressing     Problem: Knowledge Deficit  Goal: Patient/family/caregiver demonstrates understanding of disease process, treatment plan, medications, and discharge instructions  Description: Complete learning assessment and assess knowledge base.  Interventions:  - Provide teaching at level of understanding  - Provide teaching via preferred learning methods  Outcome: Progressing     Problem: Neurological Deficit  Goal: Neurological status is stable or improving  Description: Interventions:  - Monitor and assess patient's level of consciousness, motor function, sensory function, and level of assistance needed for ADLs.   - Monitor  and report changes from baseline. Collaborate with interdisciplinary team to initiate plan and implement interventions as ordered.   - Provide and maintain a safe environment.  - Consider seizure precautions.  - Consider fall precautions.  - Consider aspiration precautions.  - Consider bleeding precautions.  Outcome: Progressing     Problem: Activity Intolerance/Impaired Mobility  Goal: Mobility/activity is maintained at optimum level for patient  Description: Interventions:  - Assess and monitor patient  barriers to mobility and need for assistive/adaptive devices.  - Assess patient's emotional response to limitations.  - Collaborate with interdisciplinary team and initiate plans and interventions as ordered.  - Encourage independent activity per ability.  - Maintain proper body alignment.  - Perform active/passive rom as tolerated/ordered.  - Plan activities to conserve energy.  - Turn patient as appropriate  Outcome: Progressing     Problem: Potential for Aspiration  Goal: Non-ventilated patient's risk of aspiration is minimized  Description: Assess and monitor vital signs, respiratory status, and labs (WBC).  Monitor for signs of aspiration (tachypnea, cough, rales, wheezing, cyanosis, fever).    - Assess and monitor patient's ability to swallow.  - Place patient up in chair to eat if possible.  - HOB up at 90 degrees to eat if unable to get patient up into chair.  - Supervise patient during oral intake.   - Instruct patient/ family to take small bites.  - Instruct patient/ family to take small single sips when taking liquids.  - Follow patient-specific strategies generated by speech pathologist.  Outcome: Progressing     Problem: Nutrition  Goal: Nutrition/Hydration status is improving  Description: Monitor and assess patient's nutrition/hydration status for malnutrition (ex- brittle hair, bruises, dry skin, pale skin and conjunctiva, muscle wasting, smooth red tongue, and disorientation). Collaborate with  interdisciplinary team and initiate plan and interventions as ordered.  Monitor patient's weight and dietary intake as ordered or per policy. Utilize nutrition screening tool and intervene per policy. Determine patient's food preferences and provide high-protein, high-caloric foods as appropriate.     - Assist patient with eating.  - Allow adequate time for meals.  - Encourage patient to take dietary supplement as ordered.  - Collaborate with clinical nutritionist.  - Include patient/family/caregiver in decisions related to nutrition.  Outcome: Progressing

## 2025-01-14 NOTE — ASSESSMENT & PLAN NOTE
Family reported that patient drinks at least a case of beer a day.  Patient only endorses 5-6 beers a day. Was given high-dose thiamine  CIWA protocol in place - Has not required any Ativan.   Continue thiamine and folate supplementation on discharge

## 2025-01-14 NOTE — ASSESSMENT & PLAN NOTE
Hypertensive emergency on admission due to noncompliance.  Prescriptions for new medications and on discharge  Discharge regimen includes:  Lisinopril 20 mg daily  Toprol tartrate 25 mg twice daily  Amlodipine 2.5 mg twice daily

## 2025-01-14 NOTE — ASSESSMENT & PLAN NOTE
Patient was not taking any medication for the past 6 months or so. Has not seen a PCP since 2022  Restarted on metoprolol 25 mg twice daily with adequate rate control and restarted on Eliquis 5 mg twice daily for stroke prophylaxis.  New prescription sent for both these medications as well.  Patient advised to follow-up with PCP within 1 to 2 weeks of discharge.  Outpatient referral to cardiology also sent on discharge.

## 2025-01-14 NOTE — PHYSICAL THERAPY NOTE
PHYSICAL THERAPY NOTE          Patient Name: Jose Ramon Quiñonez Jr.  Today's Date: 1/14/2025 01/14/25 0937   PT Last Visit   PT Visit Date 01/14/25   Note Type   Note Type Treatment   Pain Assessment   Pain Assessment Tool 0-10   Pain Score 8   Pain Location/Orientation Orientation: Left;Location: Shoulder   Patient's Stated Pain Goal No pain   Hospital Pain Intervention(s) Repositioned;Ambulation/increased activity;Emotional support   Restrictions/Precautions   Weight Bearing Precautions Per Order Yes   LUE Weight Bearing Per Order (S)  WBAT   Braces or Orthoses Sling   Other Precautions Pain;Fall Risk   General   Chart Reviewed Yes   Additional Pertinent History pt signed form to not have bed or chair alarm on   Response to Previous Treatment Patient with no complaints from previous session.   Family/Caregiver Present No   Cognition   Overall Cognitive Status WFL   Arousal/Participation Responsive;Cooperative   Attention Within functional limits   Orientation Level Oriented X4   Memory Within functional limits   Following Commands Follows all commands and directions without difficulty   Comments pt pleasant and cooperative   Subjective   Subjective pt agreeable to participate in PT session   Bed Mobility   Supine to Sit Unable to assess   Sit to Supine Unable to assess   Additional Comments pt greeted ambulating within room, left sitting EOB   Transfers   Sit to Stand 5  Supervision   Additional items Increased time required   Stand to Sit 5  Supervision   Additional items Increased time required;Armrests   Additional Comments x3 STS, no AD   Ambulation/Elevation   Gait pattern Step through pattern   Gait Assistance 5  Supervision   Assistive Device None   Distance 300'+20'x4+10'x8+300'+10'   Stair Management Assistance 5  Supervision   Additional items Verbal cues   Stair Management Technique One rail R;Reciprocal   Number of Stairs  7   Ambulation/Elevation Additional Comments (S)  no overt LOB, gait speed improved to 0.96m/s, FGA score 27/30 (<22 is falls risk)   Balance   Static Sitting Good   Dynamic Sitting Good   Static Standing Fair +   Dynamic Standing Fair   Ambulatory Fair   Endurance Deficit   Endurance Deficit No   Activity Tolerance   Activity Tolerance Patient tolerated treatment well   Nurse Made Aware yes-RN cleared   Exercises   Balance training  pt completes FGA with score of 27/30. pt limited in areas requiring higher level of balance including gait with horizontal head turns and ambulating with eyes closed   Assessment   Prognosis Good   Problem List Pain   Assessment Pt pleasant and agreeable to participate in PT session. Completes mobility and therapeutic activity as outlined above. Pt ambulates community distances with supervision and is able to negotiate 7 steps with supervision. Pt has improved his mobility and scored a 27/30 on the FGA. Cutoff score for falls risk is <22. Pt demonstrates a self selected gait speed of 0.96 m/s and faster gait speed on 1.68 m/s. 0.8-1.2 m/s is considered to be a community ambulator and >1.2 m/s is required to safely cross a street. No overt LOB noted during session today. Pt demonstrates safety with mobility and is safe to dc home at this time with level 3 resources. Pt left upright at EOB, with call bell and personal items within reach and all needs met. Pt signed form to have chair/bed alarm off, bed alarm not donned at this time, RN aware.   Barriers to Discharge None   Goals   Patient Goals to go home   STG Expiration Date 01/23/25   PT Treatment Day 3   Plan   Treatment/Interventions Functional transfer training;LE strengthening/ROM;Elevations;Therapeutic exercise;Endurance training;Patient/family training;Bed mobility;Gait training;Spoke to nursing;Spoke to case management   Progress Discontinue PT   Discharge Recommendation   Rehab Resource Intensity Level, PT III (Minimum Resource  Intensity)   AM-PAC Basic Mobility Inpatient   Turning in Flat Bed Without Bedrails 4   Lying on Back to Sitting on Edge of Flat Bed Without Bedrails 4   Moving Bed to Chair 3   Standing Up From Chair Using Arms 3   Walk in Room 3   Climb 3-5 Stairs With Railing 3   Basic Mobility Inpatient Raw Score 20   Basic Mobility Standardized Score 43.99   [facility] Highest Level Of Mobility   -HLM Goal 6: Walk 10 steps or more   -HLM Achieved 8: Walk 250 feet ot more   Education   Education Provided Mobility training;Home exercise program   Patient Demonstrates acceptance/verbal understanding   End of Consult   Patient Position at End of Consult Seated edge of bed;All needs within reach   Regina Ricks, MARCELLET

## 2025-01-14 NOTE — ASSESSMENT & PLAN NOTE
Presented with visual disturbance, LUE numbness/weakness, dysarthria, gait disturbance that started 4 days prior to presentation.  Multiple stroke risk factors including afib not on AC, untreated HTN due to noncompliance with meds, prior history of embolic stroke R cerebellar  CTH:New small infarct in the right occipital lobe that could be acute. No acute hemorrhage.   CTA (1/7): Proximal occlusion of the P2 segment of the right PCA. Stable 3 mm ACOM aneurysm. Moderate stenosis cervical left ICA progressed from prior study. 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   MRI brain (1/8): ecent infractions in the right thalamus, and right occipital lobe, right temporal lobe.  This is discontinued because regions of acute/recent infractions are subtended by the right posterior cerebral artery correlating to the P2 segment of the right posterior cerebral artery occlusion seen on yesterday's CTA.  No acute hemorrhagic conversion  Echo 1/8: EF 65%, wall motion normal, no significant valvular abnormalities.    Appreciate neurology recommendations.  Etiology felt to be cardioembolic versus atheroembolic in the setting of noncompliance with AC/AP regimen.  Restarted on aspirin 81 mg daily, Eliquis 5 mg twice daily, atorvastatin 20 mg daily, and blood pressure regimen..  New prescriptions sent for all medications on discharge and patient advised to follow-up with his PCP for ongoing management.  He stated that he already has a follow-up scheduled.  Overall patient markedly improved prior to discharge.  Able to return home safely with outpatient prescription for PT/OT.  Outpatient follow-up with neurology in 6 weeks

## 2025-01-14 NOTE — ASSESSMENT & PLAN NOTE
Presented with visual disturbance, LUE numbness/weakness, dysarthria, gait disturbance that started 4 days ago, & felt dizzy  Multiple stroke risk factors including afib not on AC, untreated HTN due to noncompliance with meds, prior history of embolic stroke R cerebellar  CTH:New small infarct in the right occipital lobe that could be acute. No acute hemorrhage.   CTA: Proximal occlusion of the P2 segment of the right PCA. Stable 3 mm ACOM aneurysm. Moderate stenosis cervical left ICA progressed from prior study. 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   Neurology following  MRI brain results reviewed-recent infractions in the right thalamus, and right occipital lobe, right temporal lobe.  This is discontinued because regions of acute/recent infractions are subtended by the right posterior cerebral artery correlating to the P2 segment of the right posterior cerebral artery occlusion seen on yesterday's CTA.  No acute hemorrhagic conversion  Echo reviewed - ejection fraction 65%.  Systolic function is normal.  ASA, statin  Neuro checks, stroke pathway  Goal normotension SBP<130/80  Started on Eliquis.  Outpatient follow-up with neurology  Outpatient follow-up with cardiology given longstanding persistent atrial fibrillation

## 2025-01-14 NOTE — ASSESSMENT & PLAN NOTE
Pt reports noncompliance with medications for the last several months.  Patient reports PCP appointment arranged prior to discharge.

## 2025-01-14 NOTE — ASSESSMENT & PLAN NOTE
Fall in the emergency room  Ortho evaluation appreciated.  Left upper extremity nonweightbearing and in a sling.  Patient is noncompliant with sling and also weightbearing instructions  Outpatient follow-up with orthopedic surgery in 1 week.  Contact info provided on discharge.

## 2025-01-15 ENCOUNTER — TELEPHONE (OUTPATIENT)
Dept: CARDIOLOGY CLINIC | Facility: CLINIC | Age: 70
End: 2025-01-15

## 2025-01-15 NOTE — TELEPHONE ENCOUNTER
Left message for patient to call back and schedule a new patient hospital follow up from recent hospital stay. Patient has not seen [facility] cardiology in 5 years- reestablish care. Referral in chart.

## 2025-01-20 ENCOUNTER — TELEPHONE (OUTPATIENT)
Dept: NEUROLOGY | Facility: CLINIC | Age: 70
End: 2025-01-20

## 2025-01-20 NOTE — TELEPHONE ENCOUNTER
Post CVA Discharge Follow Up  Hospitalization: 1/7/25-1/14/25    Called patient to obtain an update and to help schedule the neurology HFU appointment. There was no answer. Left a voice message requesting for a return call. Provided the office's phone number.

## 2025-01-27 NOTE — TELEPHONE ENCOUNTER
Post CVA Discharge Follow Up  Hospitalization: 1/7/25-1/14/25    Called patient to obtain an update and to help schedule the neurology HFU appointment. There was no answer. Left a voice message requesting for a return call. Provided the office's phone number.     Mailed unable to reach letter.

## 2025-01-28 NOTE — PROGRESS NOTES
Cardiology Consultation     Jose Ramon Quiñonez Jr.  [id number]  1955  [facility] CARDIOLOGY [place]  [address] [place] [id number]      1. Longstanding persistent atrial fibrillation (HCC)        2. Cerebrovascular accident (CVA), unspecified mechanism (HCC)        3. Primary hypertension        4. Mixed hyperlipidemia        5. Uncomplicated alcohol dependence (HCC)        6. Aneurysm (HCC)        7. Closed nondisplaced fracture of greater tuberosity of left humerus, sequela  Ambulatory Referral to Orthopedic Surgery      8. Nicotine dependence with current use  nicotine (NICODERM CQ) 14 mg/24hr TD 24 hr patch          Discussion/Summary:  Persistent atrial fibrillation  - Anticoagulated on Eliquis 5 mg twice daily-> patient reports being unable to for the Eliquis, so only  a few tablets and has run out, working on getting Medicaid assistance to cover the Eliquis, gave patient 4-week supply of Eliquis to bridge him  - TTE 1/8/2025 showed EF 65%, mild LA, dilated RA, mild to moderate TR  - Rate control with Lopressor 25 mg twice daily  CVA  - Prior CVA around 2022  - Hospitalized again in January 2025 with recurrent CVA in setting of not taking any medications including AC for his A-fib and blood pressure medications  -Brain MRI 1/8/2025 showed recent infarcts in the right thalamus, right occipital and right temporal lobe, mild chronic microangiopathic changes, stable old right cerebellar infarct  - CTA head and neck 1/7/2025 showed proximal occlusion of the P2 segment of the right PCA, stable 3 mm ACOM aneurysm, moderate stenosis of the left cervical ICA, moderate to severe stenosis of the right proximal vertebral artery  - Continue with aspirin 81 mg daily and Eliquis 5 mg twice daily  Hypertension  - Continue with lisinopril 20 mg daily, Lopressor 25 mg twice daily and amlodipine 2.5 mg twice daily  -Blood pressure is well-controlled today  Hyperlipidemia  - Continue with atorvastatin 20 mg  daily  - Lipid profile 1/7/2025 showed total cholesterol 116, triglyceride 74, HDL 47, LDL 54  Alcohol use  -Reports drinking at least a sixpack per day and occasional hard liquor  - Encouraged patient to cut back on his alcohol use  Tobacco use  -Reports smoking about 1/2 pack/day  - Reports rolling his own cigarettes  - Encourage smoking cessation  - Gave prescription for nicotine patches 14 mg dose  Left humerus fracture  - Patient had a fall in the emergency department and hospitalized in January 2025  - Left shoulder x-ray 1/8/2025 showed acute nondisplaced fracture of the greater tuberosity of the left humerus  - Patient missed his orthopedic surgery follow-up appointment on 1/22/2025  - Not wearing his sling and not limiting his left arm use  - Gave new referral to orthopedic surgery  RAUDEL  Hypothyroidism    Follow-up in 6 months.    History of Present Illness:  Jose Ramon Quiñonez Jr. is a 69 y.o. year old male with a past medical history of persistent atrial fibrillation, prior CVAs, hypertension, hyperlipidemia, tobacco use, alcohol use, hypothyroidism, and RAUDEL.  Patient was hospitalized from 1/7 to 1/14/2025 with blurry vision and numbness on the left side of his face and left arm.  He was found to have new strokes in his right thalamus, right occipital and right temporal lobe.  Patient reportedly stopped all of his medications including his anticoagulation and antihypertensives 6 months prior to the ED visit.  He is accompanied by his daughter today.  He reports the Eliquis is too expensive for him and his daughter is working on try to get him Medicaid coverage for the medication.  Gave patient a weeks of Eliquis samples.  Also continue to complain of his left arm pain from his fractured humerus.  Has not been wearing his sling and also missed his orthopedic surgery follow-up appointment.  He reports continued to smoke about half pack per day.  Reports rolling his own cigarettes.  Also reports drinking about a  sixpack per day and then occasional hard liquor.    Patient Active Problem List   Diagnosis    CVA (cerebral vascular accident) (HCC)    Longstanding persistent atrial fibrillation (HCC)    HTN (hypertension)    Hypothyroid    Aneurysm (HCC)    RAUDEL (obstructive sleep apnea)    Unwitnessed fall    Hyperlipidemia    Status post total shoulder arthroplasty, left    Personal history of noncompliance with medical treatment and regimen    Nicotine dependence with current use    Alcohol dependence (HCC)    Psychosocial stressors    Closed nondisplaced fracture of greater tuberosity of left humerus     Past Medical History:   Diagnosis Date    A-fib (HCC)     Disease of thyroid gland     Hypertension     Stroke (HCC)      Social History     Socioeconomic History    Marital status: /Civil Union     Spouse name: Not on file    Number of children: Not on file    Years of education: Not on file    Highest education level: Not on file   Occupational History    Not on file   Tobacco Use    Smoking status: Every Day     Current packs/day: 0.50     Types: Cigarettes    Smokeless tobacco: Never   Vaping Use    Vaping status: Never Used   Substance and Sexual Activity    Alcohol use: Yes    Drug use: Not Currently    Sexual activity: Not on file   Other Topics Concern    Not on file   Social History Narrative    Not on file     Social Drivers of Health     Financial Resource Strain: Not on file   Food Insecurity: No Food Insecurity (1/8/2025)    Nursing - Inadequate Food Risk Classification     Worried About Running Out of Food in the Last Year: Not on file     Ran Out of Food in the Last Year: Not on file     Ran Out of Food in the Last Year: Never true   Transportation Needs: No Transportation Needs (1/8/2025)    Nursing - Transportation Risk Classification     Lack of Transportation: Not on file     Lack of Transportation: No   Physical Activity: Not on file   Stress: Not on file   Social Connections: Not on file   Intimate  Partner Violence: Unknown (2025)    Nursing IPS     Feels Physically and Emotionally Safe: Not on file     Physically Hurt by Someone: Not on file     Humiliated or Emotionally Abused by Someone: Not on file     Physically Hurt by Someone: No     Hurt or Threatened by Someone: No   Housing Stability: Unknown (2025)    Nursing: Inadequate Housing Risk Classification     Has Housing: Not on file     Worried About Losing Housing: Not on file     Unable to Get Utilities: Not on file     Unable to Pay for Housing in the Last Year: No     Has Housin      Family History   Problem Relation Age of Onset    No Known Problems Mother     Diabetes Father      Past Surgical History:   Procedure Laterality Date    ROTATOR CUFF REPAIR         Current Outpatient Medications:     amLODIPine (NORVASC) 2.5 mg tablet, Take 1 tablet (2.5 mg total) by mouth 2 (two) times a day, Disp: 60 tablet, Rfl: 0    apixaban (ELIQUIS) 5 mg, Take 1 tablet (5 mg total) by mouth every 12 (twelve) hours, Disp: 60 tablet, Rfl: 0    atorvastatin (LIPITOR) 20 mg tablet, Take 1 tablet (20 mg total) by mouth daily with dinner, Disp: 30 tablet, Rfl: 0    folic acid (FOLVITE) 1 mg tablet, Take 1 tablet (1 mg total) by mouth daily, Disp: 30 tablet, Rfl: 0    levothyroxine 75 mcg tablet, Take 1 tablet (75 mcg total) by mouth daily in the early morning, Disp: 30 tablet, Rfl: 0    lisinopril (ZESTRIL) 20 mg tablet, Take 1 tablet (20 mg total) by mouth daily, Disp: 30 tablet, Rfl: 0    metoprolol tartrate (LOPRESSOR) 25 mg tablet, Take 1 tablet (25 mg total) by mouth every 12 (twelve) hours, Disp: 60 tablet, Rfl: 0    nicotine (NICODERM CQ) 14 mg/24hr TD 24 hr patch, Place 1 patch on the skin over 24 hours every 24 hours, Disp: 28 patch, Rfl: 0    thiamine 100 MG tablet, Take 1 tablet (100 mg total) by mouth daily, Disp: 30 tablet, Rfl: 0    aspirin 81 mg chewable tablet, Chew 1 tablet (81 mg total) daily (Patient not taking: Reported on 2025),  Disp: 30 tablet, Rfl: 0  No Known Allergies      Labs:  Lab Results   Component Value Date    ALT 17 01/08/2025    AST 25 01/08/2025    BUN 25 01/12/2025    CALCIUM 8.7 01/12/2025     01/12/2025    CO2 26 01/12/2025    CREATININE 1.13 01/12/2025    HDL 47 01/07/2025    HCT 46.5 01/11/2025    HGB 15.8 01/11/2025    HGBA1C 5.3 01/08/2025    MG 1.9 01/09/2025    PHOS 3.4 01/09/2025     01/11/2025    K 3.7 01/12/2025     05/02/2015    TRIG 74 01/07/2025    WBC 7.35 01/11/2025       Imaging: XR shoulder 2+ vw left  Result Date: 1/8/2025  Narrative: XR SHOULDER 2+ VW LEFT INDICATION: Fall/trauma with pain, decreased range of motion. COMPARISON: Prior chest x-ray from 2020 included much of the shoulder joint. FINDINGS: There is a nondisplaced fracture of the greater tuberosity of the humeral head. No dislocation. Acromioclavicular joint normally aligned. No significant degenerative changes. No lytic or blastic osseous lesion. There appears to be some swelling of the deltoid region. Atherosclerotic calcification visible along the axillary artery     Impression: Acute nondisplaced fracture of the greater tuberosity of the humeral head The study was marked in EPIC for immediate notification. Computerized Assisted Algorithm (CAA) may have been used to analyze all applicable images. Workstation performed: [id number]     MRI brain wo contrast  Result Date: 1/8/2025  Narrative: MRI BRAIN WITHOUT CONTRAST INDICATION: stroke. COMPARISON:   MRI brain 8/20/2020, CTA head and neck 1/7/2025 TECHNIQUE:  Multiplanar, multisequence imaging of the brain was performed. Imaging performed on 1.5T MRI IMAGE QUALITY:  Diagnostic. FINDINGS: BRAIN PARENCHYMA: There is diffusion restriction of the right thalamus, right occipital lobe, and right temporal lobe posteriorly, along the lateral ventricle with corresponding T2/FLAIR hyperintensity which corresponds with some of the hypodensities noted on the head CT from yesterday.  There is no discrete mass, mass effect or midline shift. There is no intracranial hemorrhage. Small scattered hyperintensities on T2/FLAIR imaging are noted in the periventricular and subcortical white matter demonstrating an appearance that is statistically most likely to represent mild microangiopathic change. Similar appearance of encephalomalacia and gliosis of the right cerebellar hemisphere with associated internal susceptibility blooming artifact, exemplified on series 7, image 41 correlating to the old prior hemorrhagic infarct. VENTRICLES:  Ventricles and extra-axial CSF spaces are prominent commensurate with the degree of volume loss.  No hydrocephalus.  No acute intraventricular hemorrhage. SELLA AND PITUITARY GLAND:  Normal. ORBITS:  Normal. PARANASAL SINUSES:  Normal. VASCULATURE:  Evaluation of the major intracranial vasculature demonstrates appropriate flow voids. CALVARIUM AND SKULL BASE:  Normal. EXTRACRANIAL SOFT TISSUES:  Normal.     Impression: 1. Recent infarctions in the right thalamus, right occipital lobe, and right temporal lobe, correlating to some of the findings on yesterday's head CT. These discontiguous regions of acute/recent infarction are subtended by the right posterior cerebral artery, correlating to the P2 segment of the right posterior cerebral artery occlusion seen on yesterday's CTA. There is no acute hemorrhagic conversion. 2. Mild chronic microangiopathic changes. 3. Stable appearance of old right cerebellar infarct. Resident: Kel Dean I, the attending radiologist, have reviewed the images and agree with the final report above. Workstation performed: [id number]     Echo complete w/ contrast if indicated  Addendum Date: 1/8/2025  Addendum:   Left Ventricle: Left ventricular cavity size is normal. Wall thickness is upper normal. The left ventricular ejection fraction is 65%. Systolic function is normal. Wall motion is normal.   Left Atrium: The atrium is mildly  dilated.   Right Atrium: The atrium is dilated.   Mitral Valve: There is trace regurgitation.   Tricuspid Valve: There is mild to moderate regurgitation.   Pulmonary Artery: The pulmonary artery systolic pressure is normal.     Result Date: 1/8/2025  Narrative:   Left Ventricle: Left ventricular cavity size is normal. Wall thickness is upper normal. The left ventricular ejection fraction is 65%. Systolic function is normal. Wall motion is normal.   Left Atrium: The atrium is mildly dilated.   Right Atrium: The atrium is dilated.   Mitral Valve: There is trace regurgitation.   Tricuspid Valve: There is mild to moderate regurgitation.   Pulmonary Artery: The pulmonary artery systolic pressure is normal.     CTA head and neck with and without contrast  Result Date: 1/7/2025  Narrative: CTA NECK AND BRAIN WITH AND WITHOUT CONTRAST INDICATION: left facial numbness visual changes, hand weakness COMPARISON:   CT dated 9/25/2020, CTA dated 8/22/2020, 8/20/2020 and MRI dated 8/20/2020. TECHNIQUE:  Routine CT imaging of the Brain without contrast.Post contrast imaging was performed after administration of iodinated contrast through the neck and brain. Post contrast axial 0.625 mm images timed to opacify the arterial system.  3D rendering was performed on an independent workstation.   MIP reconstructions performed. Coronal and sagittal reconstructions were performed of the non contrast portion of the brain. Radiation dose length product (DLP) for this visit:  1485 mGy-cm .  This examination, like all CT scans performed in the [facility], was performed utilizing techniques to minimize radiation dose exposure, including the use of iterative reconstruction and automated exposure control. IV Contrast:  85 mL of iohexol (OMNIPAQUE) IMAGE QUALITY:   Diagnostic FINDINGS: NONCONTRAST BRAIN PARENCHYMA: New small infarct in the right occipital lobe that could be acute or subacute. No acute hemorrhage or mass effect.  Stable chronic infarct in the right cerebellum. Stable mild chronic microangiopathy. VENTRICLES AND EXTRA-AXIAL SPACES:Normal for the patient's age. VISUALIZED ORBITS: Normal. PARANASAL SINUSES: Normal. CTA NECK ARCH AND GREAT VESSELS: Mild atherosclerotic changes without significant stenosis. VERTEBRAL ARTERIES: Moderate to severe stenosis in the right vertebral artery just distal to the origin.. No focal stenosis in the hypoplastic right vertebral artery. Mild to moderate stenosis at the origin of the dominant left vertebral artery. RIGHT CAROTID: Calcified plaque at the bifurcation and proximal ICA. Less than 50% stenosis.   No dissection. LEFT CAROTID: Moderate atherosclerotic disease at the bifurcation and proximal ICA. 50-69% stenosis that was previously less than 50%..   Tortuous cervical ICA. There is a stable 5 mm pseudoaneurysm projecting posteriorly from the distal cervical ICA (image 269 series 6). No dissection NASCET criteria was used to determine the degree of internal carotid artery diameter stenosis. CTA BRAIN: INTERNAL CAROTID ARTERIES: Calcified plaque in the cavernous and supraclinoid internal carotid arteries with mild narrowing. No high-grade stenosis. ANTERIOR CEREBRAL ARTERY CIRCULATION: Stable 3 mm inferior directed ACOM aneurysm. No significant stenosis. Left A1 segment is dominant. MIDDLE CEREBRAL ARTERY CIRCULATION:  No stenosis or occlusion. DISTAL VERTEBRAL ARTERIES:  No stenosis or occlusion. Right vertebral artery is hypoplastic. BASILAR ARTERY:  No stenosis or occlusion. POSTERIOR CEREBRAL ARTERIES: New proximal right P2 occlusion. Left PCA is patent and without significant stenosis. Large patent left P-comm. VENOUS STRUCTURES:  Normal. NON VASCULAR ANATOMY BONY STRUCTURES:  No acute osseous abnormality. SOFT TISSUES OF THE NECK:  Normal. THORACIC INLET:  Unremarkable.     Impression: CT Brain: New small infarct in the right occipital lobe that could be acute. No acute hemorrhage. CT  Angiography: Proximal occlusion of the P2 segment of the right PCA. Stable 3 mm ACOM aneurysm. Moderate stenosis cervical left ICA progressed from prior study. 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 I personally discussed this study with RAMIN BLANTON on 2025 6:56 PM. Workstation performed: [id number]       EC2025: Atrial fibrillation at a rate of 91 bpm, poor R wave progression    Review of Systems:  Review of Systems   Constitutional:  Negative for chills, diaphoresis, fatigue and fever.   HENT:  Negative for congestion.    Eyes:  Negative for photophobia and visual disturbance.   Respiratory:  Negative for chest tightness and shortness of breath.    Cardiovascular:  Negative for chest pain, palpitations and leg swelling.   Gastrointestinal:  Negative for abdominal distention, abdominal pain, diarrhea, nausea and vomiting.   Genitourinary:  Negative for difficulty urinating and dysuria.   Musculoskeletal:  Positive for arthralgias and myalgias. Negative for gait problem and joint swelling.   Skin:  Negative for color change, pallor and rash.   Neurological:  Positive for weakness. Negative for dizziness, syncope, numbness and headaches.   Psychiatric/Behavioral:  Negative for agitation, behavioral problems and confusion.          Vitals:    25 1031   BP: 124/80   Pulse: 80      Vitals:    25 1031   Weight: 78.5 kg (173 lb)           Physical Exam:  General appearance:  Appears stated age, alert, well appearing and in no distress  HEENT:  PERRLA, EOMI, no scleral icterus, no conjunctival pallor  NECK:  Supple, No elevated JVP, no thyromegaly, no carotid bruits  HEART:  Regular rate, irregularly irregular rhythm, normal S1/S2, no S3/S4, no murmur or rub  LUNGS:  Clear to auscultation bilaterally  ABDOMEN:  Soft, non-tender, positive bowel sounds, no rebound or guarding, no organomegaly   EXTREMITIES:  No significant edema  VASCULAR:  Normal pedal  pulses   SKIN: No lesions or rashes on exposed skin  NEURO:  CN II-XII intact, left-sided weakness and left hand swelling

## 2025-01-29 ENCOUNTER — OFFICE VISIT (OUTPATIENT)
Dept: CARDIOLOGY CLINIC | Facility: CLINIC | Age: 70
End: 2025-01-29
Payer: MEDICARE

## 2025-01-29 ENCOUNTER — TELEPHONE (OUTPATIENT)
Dept: CARDIOLOGY CLINIC | Facility: CLINIC | Age: 70
End: 2025-01-29

## 2025-01-29 VITALS
WEIGHT: 173 LBS | HEART RATE: 80 BPM | DIASTOLIC BLOOD PRESSURE: 80 MMHG | SYSTOLIC BLOOD PRESSURE: 124 MMHG | BODY MASS INDEX: 26.3 KG/M2

## 2025-01-29 DIAGNOSIS — F17.200 NICOTINE DEPENDENCE WITH CURRENT USE: ICD-10-CM

## 2025-01-29 DIAGNOSIS — S42.255S CLOSED NONDISPLACED FRACTURE OF GREATER TUBEROSITY OF LEFT HUMERUS, SEQUELA: ICD-10-CM

## 2025-01-29 DIAGNOSIS — I63.9 CEREBROVASCULAR ACCIDENT (CVA), UNSPECIFIED MECHANISM (HCC): ICD-10-CM

## 2025-01-29 DIAGNOSIS — I72.9 ANEURYSM (HCC): ICD-10-CM

## 2025-01-29 DIAGNOSIS — F10.20 UNCOMPLICATED ALCOHOL DEPENDENCE (HCC): ICD-10-CM

## 2025-01-29 DIAGNOSIS — E78.2 MIXED HYPERLIPIDEMIA: ICD-10-CM

## 2025-01-29 DIAGNOSIS — I48.11 LONGSTANDING PERSISTENT ATRIAL FIBRILLATION (HCC): Primary | ICD-10-CM

## 2025-01-29 DIAGNOSIS — I10 PRIMARY HYPERTENSION: ICD-10-CM

## 2025-01-29 PROCEDURE — 99204 OFFICE O/P NEW MOD 45 MIN: CPT | Performed by: STUDENT IN AN ORGANIZED HEALTH CARE EDUCATION/TRAINING PROGRAM

## 2025-01-29 RX ORDER — NICOTINE 21 MG/24HR
1 PATCH, TRANSDERMAL 24 HOURS TRANSDERMAL EVERY 24 HOURS
Qty: 28 PATCH | Refills: 0 | Status: SHIPPED | OUTPATIENT
Start: 2025-01-29

## 2025-01-29 NOTE — TELEPHONE ENCOUNTER
Patient seen in office by Dr. Lexie Owen provided samples to patient    Eliquis 5mg  Lot# [id number]  Exp# Jan/2026  4 boxes

## 2025-02-07 ENCOUNTER — TELEPHONE (OUTPATIENT)
Dept: NEUROLOGY | Facility: CLINIC | Age: 70
End: 2025-02-07

## 2025-02-07 NOTE — TELEPHONE ENCOUNTER
Post CVA Discharge Follow Up  Hospitalization: 1/7/25-1/14/25    Called patient to obtain an update. There was no answer. Left a voice message requesting for a return call. Provided the office's phone number.

## 2025-02-14 NOTE — TELEPHONE ENCOUNTER
Post CVA Discharge Follow Up  Hospitalization: 1/7/25-1/14/25     3rd attempt- Called patient to obtain an update. There was no answer. Left a voice message requesting for a return call. Provided the office's phone number.    Mailed unable to reach letter.

## 2025-04-07 ENCOUNTER — APPOINTMENT (OUTPATIENT)
Dept: RADIOLOGY | Facility: MEDICAL CENTER | Age: 70
End: 2025-04-07
Payer: MEDICARE

## 2025-04-07 ENCOUNTER — OFFICE VISIT (OUTPATIENT)
Dept: OBGYN CLINIC | Facility: MEDICAL CENTER | Age: 70
End: 2025-04-07
Payer: MEDICARE

## 2025-04-07 VITALS — BODY MASS INDEX: 26.22 KG/M2 | WEIGHT: 173 LBS | HEIGHT: 68 IN

## 2025-04-07 DIAGNOSIS — R20.0 NUMBNESS AND TINGLING IN LEFT HAND: ICD-10-CM

## 2025-04-07 DIAGNOSIS — R20.2 NUMBNESS AND TINGLING IN LEFT HAND: ICD-10-CM

## 2025-04-07 DIAGNOSIS — S42.255D CLOSED NONDISPLACED FRACTURE OF GREATER TUBEROSITY OF LEFT HUMERUS WITH ROUTINE HEALING, SUBSEQUENT ENCOUNTER: ICD-10-CM

## 2025-04-07 DIAGNOSIS — S42.255D CLOSED NONDISPLACED FRACTURE OF GREATER TUBEROSITY OF LEFT HUMERUS WITH ROUTINE HEALING, SUBSEQUENT ENCOUNTER: Primary | ICD-10-CM

## 2025-04-07 PROCEDURE — 99203 OFFICE O/P NEW LOW 30 MIN: CPT | Performed by: ORTHOPAEDIC SURGERY

## 2025-04-07 PROCEDURE — 73030 X-RAY EXAM OF SHOULDER: CPT

## 2025-04-07 PROCEDURE — 99213 OFFICE O/P EST LOW 20 MIN: CPT | Performed by: ORTHOPAEDIC SURGERY

## 2025-04-07 RX ORDER — LISINOPRIL 10 MG/1
10 TABLET ORAL DAILY
COMMUNITY
Start: 2025-04-04

## 2025-04-07 NOTE — PROGRESS NOTES
Orthopaedic Surgery - Office Note  Jose Ramon Quiñonez Jr. (69 y.o. male)   : 1955   MRN: [id number]  Encounter Date: 2025    Assessment / Plan    Closed non-displaced fracture of the greater tuberosity of the left humerus with routine healing  X-rays were reviewed with the patient from today's visit.  Discussed that he is mildly stiff due to fracture healing and not much activity.  I did explain to him that this could be causing the pain.  Offered a referral to PT. patient declined and wishes to complete a HEP.  Given that he has numbness and tingling in his fingers, I placed a referral for a hand specialist.  Ice and over-the-counter pain medication as needed with increased activity.  Activity as tolerated with no formal restrictions using pain as a guide.  All patient's question concerns were addressed at today's visit.  Follow-up:  No follow-ups on file.      Chief Complaint / Date of Onset  Left arm pain-2025  Injury Mechanism / Date  Fall due to stroke-2025  Surgery / Date  None    History of Present Illness   Jose Ramon Quiñonez Jr. is a 69 y.o. male who presents for initial evaluation of left humerus fracture sustained on 2025.  He states that he had a stroke and fell in the hospital due to balance and dizziness.  Since then, he states the pain has greatly improved, but still has difficulty sleeping throughout the night.  He says pain is increased with reaching out activities.  He has not had any physical therapy and has not use any over-the-counter pain medication.  He states that he has some numbness and tingling in his hand.    Treatment Summary  Medications / Modalities  None  Bracing / Immobilization  None  Physical Therapy  None  Injections  None  Prior Surgeries  None  Other Treatments  None    Employment / Current Status  Unknown    Sport / Organization / Current Status  Active      Review of Systems  Pertinent items are noted in HPI.  All other systems were reviewed and are  "negative.      Physical Exam  Ht 5' 8\" (1.727 m)   Wt 78.5 kg (173 lb)   BMI 26.30 kg/m²   Cons: Appears well.  No apparent distress.  Psych: Alert. Oriented x3.  Mood and affect normal.  Eyes: PERRLA, EOMI  Resp: Normal effort.  No audible wheezing or stridor.  CV: Palpable pulse.  No discernable arrhythmia.  No LE edema.  Lymph:  No palpable cervical, axillary, or inguinal lymphadenopathy.  Skin: Warm.  No palpable masses.  No visible lesions.  Neuro: Normal muscle tone.  Normal and symmetric DTR's.     Left Shoulder Exam  Alignment / Posture:  Normal shoulder posture.  Inspection:  No swelling. No edema. No erythema. No ecchymosis.  Palpation:   Mild tenderness at greater tuberosity.  ROM:  Shoulder . Shoulder ER 60. Shoulder IR T8.  Strength:  Supraspinatus 4+/5. Infraspinatus 4/5. Subscapularis 5/5.  Stability:  No objective shoulder instability.  Tests: (+) Painful arc. (-) Mehta. (-) Neer. (-) Cross-body adduction.  Neurovascular:  Sensation intact in Ax/R/M/U nerve distributions. Sensation intact C5-T1 BUE. 2+ radial pulse.       Studies Reviewed  XR of left shoulder - x-rays from today's visit demonstrate healed fracture of the greater tuberosity.  Compared to the images on 1/8/2025, there is no visible fracture piece.  No acute osseous abnormalities.      Procedures  No procedures today.    Medical, Surgical, Family, and Social History  The patient's medical history, family history, and social history, were reviewed and updated as appropriate.    Past Medical History:   Diagnosis Date    A-fib (HCC)     Disease of thyroid gland     Hypertension     Stroke (HCC)        Past Surgical History:   Procedure Laterality Date    ROTATOR CUFF REPAIR         Family History   Problem Relation Age of Onset    No Known Problems Mother     Diabetes Father        Social History     Occupational History    Not on file   Tobacco Use    Smoking status: Every Day     Current packs/day: 0.50     Types: Cigarettes    " Smokeless tobacco: Never   Vaping Use    Vaping status: Never Used   Substance and Sexual Activity    Alcohol use: Yes    Drug use: Not Currently    Sexual activity: Not on file       No Known Allergies      Current Outpatient Medications:     amLODIPine (NORVASC) 2.5 mg tablet, Take 1 tablet (2.5 mg total) by mouth 2 (two) times a day, Disp: 60 tablet, Rfl: 0    apixaban (ELIQUIS) 5 mg, Take 1 tablet (5 mg total) by mouth every 12 (twelve) hours, Disp: 60 tablet, Rfl: 0    atorvastatin (LIPITOR) 20 mg tablet, Take 1 tablet (20 mg total) by mouth daily with dinner, Disp: 30 tablet, Rfl: 0    folic acid (FOLVITE) 1 mg tablet, Take 1 tablet (1 mg total) by mouth daily, Disp: 30 tablet, Rfl: 0    levothyroxine 75 mcg tablet, Take 1 tablet (75 mcg total) by mouth daily in the early morning, Disp: 30 tablet, Rfl: 0    lisinopril (ZESTRIL) 10 mg tablet, Take 10 mg by mouth daily, Disp: , Rfl:     lisinopril (ZESTRIL) 20 mg tablet, Take 1 tablet (20 mg total) by mouth daily, Disp: 30 tablet, Rfl: 0    metoprolol tartrate (LOPRESSOR) 25 mg tablet, Take 1 tablet (25 mg total) by mouth every 12 (twelve) hours, Disp: 60 tablet, Rfl: 0    nicotine (NICODERM CQ) 14 mg/24hr TD 24 hr patch, Place 1 patch on the skin over 24 hours every 24 hours, Disp: 28 patch, Rfl: 0    thiamine 100 MG tablet, Take 1 tablet (100 mg total) by mouth daily, Disp: 30 tablet, Rfl: 0    aspirin 81 mg chewable tablet, Chew 1 tablet (81 mg total) daily (Patient not taking: Reported on 4/7/2025), Disp: 30 tablet, Rfl: 0      Prasad Ramey    Scribe Attestation      I,:   am acting as a scribe while in the presence of the attending physician.:       I,:   personally performed the services described in this documentation    as scribed in my presence.:

## 2025-04-22 ENCOUNTER — OFFICE VISIT (OUTPATIENT)
Dept: OBGYN CLINIC | Facility: MEDICAL CENTER | Age: 70
End: 2025-04-22
Payer: MEDICARE

## 2025-04-22 VITALS — BODY MASS INDEX: 24.86 KG/M2 | WEIGHT: 164 LBS | HEIGHT: 68 IN

## 2025-04-22 DIAGNOSIS — R20.2 NUMBNESS AND TINGLING IN LEFT HAND: Primary | ICD-10-CM

## 2025-04-22 DIAGNOSIS — R20.0 NUMBNESS AND TINGLING IN LEFT HAND: Primary | ICD-10-CM

## 2025-04-22 PROCEDURE — 99214 OFFICE O/P EST MOD 30 MIN: CPT | Performed by: ORTHOPAEDIC SURGERY

## 2025-04-22 NOTE — PROGRESS NOTES
The HAND & UPPER EXTREMITY OFFICE VISIT   Referred By:  Luciano Stubbs Md  [address] [place] [address]  [address] [place] [address]      Chief Complaint:     Left hand numbness and tingling    History of Present Illness:   69 y.o., male presents with left hand numbness and tingling which has persisted since his CVA in January. He was hospitalized 1/7-1/14 for CVA with associated LUE numbness and weakness. He reports his numbness and tingling has persisted since the stroke. He denies any further weakness in the hand or any symptoms prior to his stroke. Denies any paresthesias proximal to the wrist. Denies any symptoms on the right side.     He was also recently seen by Dr. Stubbs for management of his left proximal humerus fracture which he sustained at the time of his CVA from an associated fall.       ADLs: Community ambulator  Smoke: reports 1/2 PPD ETOH: yes   Drugs:  denies        Past Medical History:  Past Medical History:   Diagnosis Date    A-fib (HCC)     Disease of thyroid gland     Hypertension     Stroke (HCC)      Past Surgical History:   Procedure Laterality Date    ROTATOR CUFF REPAIR       Family History   Problem Relation Age of Onset    No Known Problems Mother     Diabetes Father      Social History     Socioeconomic History    Marital status: /Civil Union     Spouse name: Not on file    Number of children: Not on file    Years of education: Not on file    Highest education level: Not on file   Occupational History    Not on file   Tobacco Use    Smoking status: Every Day     Current packs/day: 0.50     Types: Cigarettes    Smokeless tobacco: Never   Vaping Use    Vaping status: Never Used   Substance and Sexual Activity    Alcohol use: Yes    Drug use: Not Currently    Sexual activity: Not on file   Other Topics Concern    Not on file   Social History Narrative    Not on file     Social Drivers of Health     Financial Resource Strain: Not on file   Food Insecurity: No Food Insecurity  "(2025)    Nursing - Inadequate Food Risk Classification     Worried About Running Out of Food in the Last Year: Not on file     Ran Out of Food in the Last Year: Not on file     Ran Out of Food in the Last Year: Never true   Transportation Needs: No Transportation Needs (2025)    Nursing - Transportation Risk Classification     Lack of Transportation: Not on file     Lack of Transportation: No   Physical Activity: Not on file   Stress: Not on file   Social Connections: Not on file   Intimate Partner Violence: Unknown (2025)    Nursing IPS     Feels Physically and Emotionally Safe: Not on file     Physically Hurt by Someone: Not on file     Humiliated or Emotionally Abused by Someone: Not on file     Physically Hurt by Someone: No     Hurt or Threatened by Someone: No   Housing Stability: Unknown (2025)    Nursing: Inadequate Housing Risk Classification     Has Housing: Not on file     Worried About Losing Housing: Not on file     Unable to Get Utilities: Not on file     Unable to Pay for Housing in the Last Year: No     Has Housin     Scheduled Meds:  Continuous Infusions:No current facility-administered medications for this visit.    PRN Meds:.  No Known Allergies        Physical Examination:    Ht 5' 8\" (1.727 m)   Wt 74.4 kg (164 lb)   BMI 24.94 kg/m²     Gen: A&Ox3, NAD  Cardiac: regular rate  Chest: non labored breathing  Abdomen: Non-distended    Right Upper Extremity:  Skin CDI  No obvious deformity of the shoulder, arm, elbow, forearm, wrist, hand  Sensation intact to light touch in the axillary median, ulnar, and radial nerve distributions  5/5 motor thumb abduction  Warm, well-perfused digits  Cap refill <2s    Left Upper Extremity:  Skin CDI  Dupuytren's cords present in palm along middle and ring finger rays  Laxity at thumb CMC joint with crepitus  Paresthesias and diminished sensation to light touch throughout palm and all fingertips. Normal sensation to dorsal hand. Sensation " otherwise intact to light touch in the axillary and radial nerve distributions  5/5 interosseous, FDP 5, FDP 2, FPL  4+/5 thumb abduction  Warm, well-perfused digits  Cap refill <2s  Negative Tinel's at the carpal tunnel  Negative Durkan's  Negative Tinel's at the cubital tunnel   Negative elbow flexion compression test      Studies:  Radiographs: I personally reviewed and independently interpreted the available radiographs.  4/7/25: Radiographs of the left shoulder, multiple views, demonstrate a largely healed nondisplaced fracture of the greater tuberosity. No significant degenerative changes. Normal alignment.     Labs:  Lab Results   Component Value Date    HGBA1C 5.3 01/08/2025    HGBA1C 5.0 08/22/2020         Assessment & Plan  Numbness and tingling in left hand  69 y.o. male presents with signs and symptoms consistent with the above diagnosis.  We discussed the natural history of this condition and its pathogenesis.  We discussed operative and nonoperative treatment options. We discussed the possible causes of his paresthesias including residual changes from his CVA vs possible nerve compression. Recommend obtaining EMG of the left UE to evaluate for possible sites of nerve compression. It is recommended he return to the office following the EMG to review the results.   Orders:    Ambulatory Referral to Orthopedic Surgery    EMG; Future      he expressed understanding of the plan and agreed. We encouraged them to contact our office with any questions or concerns.         John Ferrer MD  Hand and Upper Extremity Surgery        *This note was dictated using Dragon voice recognition software. Please excuse any word substitutions or errors.*      Scribe Attestation      I,:  Laurel Castellano PA-C am acting as a scribe while in the presence of the attending physician.:       I,:  John Ferrer MD personally performed the services described in this documentation    as scribed in my presence.:

## 2025-04-22 NOTE — PATIENT INSTRUCTIONS
EMG/NCS  Your provider has ordered an EMG test for you. Someone should be reaching out shortly to help you schedule this. Below is some information to help prepare you for this test  WHAT YOU NEED TO KNOW:   This test has 2 parts. The first part is called a nerve conduction study (NCS). During this part, a technician or physician will place electrodes (stickers that read electrical signals) on your skin and will give you electrical stimulations using a small probe to see how your nerves are working. The second part, the EMG (electromyography), is done by a physician who will put a very small needle into one muscle at a time to look at the activity of this muscle which will show your muscle health, peripheral nerve health, and even looks at nerve function all the way up to your neck and/or back.   Below is a link to a video that shows you what to expect on test day.        PRE-APPOINTMENT INSTRUCTIONS:   Please CONTINUE ALL your regular medications including blood thinners and diuretics.  Do not stop any medications unless explicitly directed to by your neurologist for the purposes of a specific subtype of EMG/NCS testing for neuromuscular junction diseases (see Frequently Asked Questions below)  Please do not put on any lotions or creams the day of the exam as this may interfere with the test.  This appointment usually takes 45-60 minutes.    POST-APPOINTMENT INSTRUCTIONS:   Any discomfort or muscle fatigue should resolve within an hour of testing.  You may take Tylenol or Non-steroidal Anti-inflammatory drugs (ex: Ibuprofen, Naproxen) if needed.  Recommend avoiding strenuous activity or heavy lifting for the rest of the day if possible.       EMG FREQUENTLY ASKED QUESTIONS:    What is an EMG test?  This test has 2 parts. The first part is called a nerve conduction study. During the first part, the technician will put electrodes on your skin to test your nerves. S/he will give you electrical stimulations to see how  your nerves are working. It feels similar to when you walk across carpet and then touch something metal. Some stimulations are very mild and some are stronger.  The second part, the EMG, is done by a doctor. The doctor will put a fine needle into several muscles to look at the activity.  What does EMG stand for?  Electromyography, the study of muscles.  What does this test look for?  Disorders of the nerves and muscles. Some diagnoses include carpal tunnel syndrome, ulnar neuropathy, peripheral neuropathy, radiculopathy, myopathy, myasthenia gravis and ALS.  Why was I told not to wear lotion or body oil for the test?  The technician will put sticker electrodes on your skin. If you are wearing lotion or body oil, the stickers will not stick properly. The moisturizers can also interfere with the test readings.  How long will this test take?  This test can take 30-45 minutes if one limb is being evaluated, or 45-60 minutes if 2 limbs are being evaluated. This is just an estimate. Some tests can take longer, particularly when complex or with advanced pathology, and some will take less time.  Can I eat and drink before this test?  Yes  Can I take all of my medications before the test?  Yes, you can take all of your medications, unless your doctor specifically told you not to take something before the test.  Can I take pain medication before the test?  Yes  Can I take blood thinners if I am getting an EMG?  Yes, you should take all of your regular medications, unless your doctor specifically told you not to take something before the test. If you are on blood thinners, please inform the technician and the doctor performing your test.  Are there are any medications that I cannot take before this test?  You can take all of your regular medications, unless your doctor specifically told you not to take something.  If you are having this test to check for myasthenia gravis, your doctor might tell you to not take pyridostigmine  (Mestinon) on the day of the test, until the test is complete. If your doctor is one of the doctors performing the test, you may need to ask them specifically if you should hold this medicine. If your doctor is outside of [facility], please contact your doctor regarding instructions.  Can I have this test if I have a pacemaker or defibrillator?  As long as there are no external wires from your cardiac device (meaning outside of the skin), you can have this test. Please let the technician and doctor know if you have a cardiac device.  Can I have this test if I have an external defibrillator or LVAD (left ventricular assist device)?  No. Please contact your ordering provider for next steps.  Can I drive after the test?  Yes  How long will it take for my doctor to receive the results?  24-48 hours

## 2025-07-07 ENCOUNTER — TELEPHONE (OUTPATIENT)
Dept: CARDIOLOGY CLINIC | Facility: CLINIC | Age: 70
End: 2025-07-07